# Patient Record
Sex: MALE | Race: BLACK OR AFRICAN AMERICAN | NOT HISPANIC OR LATINO | Employment: FULL TIME | URBAN - METROPOLITAN AREA
[De-identification: names, ages, dates, MRNs, and addresses within clinical notes are randomized per-mention and may not be internally consistent; named-entity substitution may affect disease eponyms.]

---

## 2018-05-01 ENCOUNTER — OFFICE VISIT (OUTPATIENT)
Dept: INTERNAL MEDICINE | Facility: CLINIC | Age: 43
End: 2018-05-01
Payer: COMMERCIAL

## 2018-05-01 VITALS
BODY MASS INDEX: 33.15 KG/M2 | WEIGHT: 211.19 LBS | SYSTOLIC BLOOD PRESSURE: 133 MMHG | HEIGHT: 67 IN | HEART RATE: 95 BPM | TEMPERATURE: 99 F | DIASTOLIC BLOOD PRESSURE: 75 MMHG

## 2018-05-01 DIAGNOSIS — B96.89 ACUTE BACTERIAL PHARYNGITIS: Primary | ICD-10-CM

## 2018-05-01 DIAGNOSIS — J02.8 ACUTE BACTERIAL PHARYNGITIS: Primary | ICD-10-CM

## 2018-05-01 PROCEDURE — 3008F BODY MASS INDEX DOCD: CPT | Mod: CPTII,S$GLB,, | Performed by: INTERNAL MEDICINE

## 2018-05-01 PROCEDURE — 99999 PR PBB SHADOW E&M-EST. PATIENT-LVL III: CPT | Mod: PBBFAC,,, | Performed by: INTERNAL MEDICINE

## 2018-05-01 PROCEDURE — 99213 OFFICE O/P EST LOW 20 MIN: CPT | Mod: S$GLB,,, | Performed by: INTERNAL MEDICINE

## 2018-05-01 PROCEDURE — 3075F SYST BP GE 130 - 139MM HG: CPT | Mod: CPTII,S$GLB,, | Performed by: INTERNAL MEDICINE

## 2018-05-01 PROCEDURE — 3078F DIAST BP <80 MM HG: CPT | Mod: CPTII,S$GLB,, | Performed by: INTERNAL MEDICINE

## 2018-05-01 RX ORDER — AMOXICILLIN 500 MG/1
500 TABLET, FILM COATED ORAL EVERY 12 HOURS
Qty: 20 TABLET | Refills: 0 | Status: SHIPPED | OUTPATIENT
Start: 2018-05-01 | End: 2018-05-11

## 2018-05-01 NOTE — PROGRESS NOTES
"Subjective:       Patient ID: Talon Leblanc is a 43 y.o. male.    Chief Complaint: Sore Throat  HPI     Mr. Leblanc is a 42 yo male who presents to clinic complaining of sore throat for 2 days. He also states he has been having increased saliva production and "spitting up because he cant clear his secretions due to pain. He denies any cough, runny nose, fevers, chills or myalgias.     Review of Systems   Constitutional: Negative for chills, diaphoresis, fatigue and fever.   HENT: Positive for sore throat and trouble swallowing. Negative for postnasal drip, rhinorrhea, sinus pain, sinus pressure and sneezing.    Eyes: Negative for photophobia and visual disturbance.   Respiratory: Negative for cough, shortness of breath and wheezing.    Cardiovascular: Negative for chest pain, palpitations and leg swelling.   Gastrointestinal: Negative for constipation, diarrhea, nausea and vomiting.   Endocrine: Negative for polydipsia and polyuria.   Genitourinary: Negative for dysuria, hematuria and urgency.   Musculoskeletal: Negative for arthralgias, myalgias and neck pain.   Allergic/Immunologic: Negative for immunocompromised state.   Neurological: Negative for dizziness, weakness and headaches.   Hematological: Negative for adenopathy.   Psychiatric/Behavioral: Negative for agitation and behavioral problems.       Objective:      Physical Exam   Constitutional: He is oriented to person, place, and time. He appears well-developed and well-nourished. No distress.   HENT:   Head: Normocephalic and atraumatic.   Mouth/Throat: Oropharyngeal exudate, posterior oropharyngeal edema and posterior oropharyngeal erythema present. No tonsillar abscesses.   Eyes: EOM are normal. Pupils are equal, round, and reactive to light.   Neck: Normal range of motion. No JVD present.   Cardiovascular: Normal rate and regular rhythm.    No murmur heard.  Pulmonary/Chest: Effort normal and breath sounds normal. No respiratory distress. "   Abdominal: Soft. He exhibits no distension. There is no guarding.   Musculoskeletal: Normal range of motion. He exhibits no edema.   Neurological: He is alert and oriented to person, place, and time.   Skin: Skin is warm and dry.   Psychiatric: He has a normal mood and affect. His behavior is normal.       Assessment:       1. Acute bacterial pharyngitis        Plan:       1. Most likely bacterial. Will prescribe Amoxacillin 500 mg BID for 10 days. Advised patient to go to ED if he feels he cant swallow or starts to drool.     Discussed with Dr. David MD PGY-3     I have discussed A/P with Mel and agree with plan of action.  Saul Mosley

## 2018-05-01 NOTE — LETTER
May 1, 2018      [facility] - Internal Medicine  [address]  [facility] [address]  Phone: [phone]  Fax: [phone]       Patient: Talon Leblanc   YOB: 1975  Date of Visit: 05/01/2018    To Whom It May Concern:    Talon Leblanc  was at [facility] on 05/01/2018. He may return to work/school on 5/3/18 with no restrictions. If you have any questions or concerns, or if I can be of further assistance, please do not hesitate to contact me.    Sincerely,      AIDEN CordovaBS

## 2018-07-20 ENCOUNTER — OFFICE VISIT (OUTPATIENT)
Dept: INTERNAL MEDICINE | Facility: CLINIC | Age: 43
End: 2018-07-20
Payer: COMMERCIAL

## 2018-07-20 VITALS
WEIGHT: 208.31 LBS | BODY MASS INDEX: 32.7 KG/M2 | HEIGHT: 67 IN | OXYGEN SATURATION: 98 % | DIASTOLIC BLOOD PRESSURE: 72 MMHG | HEART RATE: 83 BPM | SYSTOLIC BLOOD PRESSURE: 124 MMHG

## 2018-07-20 DIAGNOSIS — L08.9 PUSTULES DETERMINED BY EXAMINATION: ICD-10-CM

## 2018-07-20 DIAGNOSIS — L03.90 CELLULITIS, UNSPECIFIED CELLULITIS SITE: Primary | ICD-10-CM

## 2018-07-20 DIAGNOSIS — M62.830 BACK SPASM: ICD-10-CM

## 2018-07-20 PROCEDURE — 96372 THER/PROPH/DIAG INJ SC/IM: CPT | Mod: S$GLB,,, | Performed by: INTERNAL MEDICINE

## 2018-07-20 PROCEDURE — 99999 PR PBB SHADOW E&M-EST. PATIENT-LVL III: CPT | Mod: PBBFAC,,, | Performed by: INTERNAL MEDICINE

## 2018-07-20 PROCEDURE — 3074F SYST BP LT 130 MM HG: CPT | Mod: CPTII,S$GLB,, | Performed by: INTERNAL MEDICINE

## 2018-07-20 PROCEDURE — 3078F DIAST BP <80 MM HG: CPT | Mod: CPTII,S$GLB,, | Performed by: INTERNAL MEDICINE

## 2018-07-20 PROCEDURE — 3008F BODY MASS INDEX DOCD: CPT | Mod: CPTII,S$GLB,, | Performed by: INTERNAL MEDICINE

## 2018-07-20 PROCEDURE — 99214 OFFICE O/P EST MOD 30 MIN: CPT | Mod: 25,S$GLB,, | Performed by: INTERNAL MEDICINE

## 2018-07-20 RX ORDER — CEPHALEXIN 500 MG/1
500 CAPSULE ORAL 4 TIMES DAILY
Qty: 40 CAPSULE | Refills: 0 | Status: SHIPPED | OUTPATIENT
Start: 2018-07-20 | End: 2018-10-06

## 2018-07-20 RX ORDER — SULINDAC 150 MG/1
150 TABLET ORAL 2 TIMES DAILY
Qty: 14 TABLET | Refills: 0 | Status: SHIPPED | OUTPATIENT
Start: 2018-07-20 | End: 2018-10-06

## 2018-07-20 RX ORDER — MUPIROCIN 20 MG/G
OINTMENT TOPICAL
Qty: 30 G | Refills: 0 | Status: SHIPPED | OUTPATIENT
Start: 2018-07-20 | End: 2018-10-06

## 2018-07-20 RX ORDER — CEFTRIAXONE 500 MG/1
500 INJECTION, POWDER, FOR SOLUTION INTRAMUSCULAR; INTRAVENOUS
Status: COMPLETED | OUTPATIENT
Start: 2018-07-20 | End: 2018-07-20

## 2018-07-20 RX ADMIN — CEFTRIAXONE 500 MG: 500 INJECTION, POWDER, FOR SOLUTION INTRAMUSCULAR; INTRAVENOUS at 02:07

## 2018-07-20 NOTE — PATIENT INSTRUCTIONS
Discharge Instructions for Cellulitis  You have been diagnosed with cellulitis. This is an infection in the deepest layer of the skin. In some cases, the infection also affects the muscle. Cellulitis is caused by bacteria. The bacteria can enter the body through broken skin. This can happen with a cut, scratch, animal bite, or an insect bite that has been scratched. You may have been treated in the hospital with antibiotics and fluids. You will likely be given a prescription for antibiotics to take at home. This sheet will help you take care of yourself at home.  Home care  When you are home:  · Take the prescribed antibiotic medicine you are given as directed until it is gone. Take it even if you feel better. It treats the infection and stops it from returning. Not taking all the medicine can make future infections hard to treat.  · Keep the infected area clean.  · When possible, raise the infected area above the level of your heart. This helps keep swelling down.  · Talk with your healthcare provider if you are in pain. Ask what kind of over-the-counter medicine you can take for pain.  · Apply clean bandages as advised.  · Take your temperature once a day for a week.  · Wash your hands often to prevent spreading the infection.  In the future, wash your hands before and after you touch cuts, scratches, or bandages. This will help prevent infection.   When to call your healthcare provider  Call your healthcare provider immediately if you have any of the following:  · Difficulty or pain when moving the joints above or below the infected area  · Discharge or pus draining from the area  · Fever of 100.4°F (38°C) or higher, or as directed by your healthcare provider  · Pain that gets worse in or around the infected   · Redness that gets worse in or around the infected area, particularly if the area of redness expands to a wider area  · Shaking chills  · Swelling of the infected area  · Vomiting   Date Last Reviewed:  8/1/2016 © 2000-2017 [employer]. [address] [place] [address], [place] [address]. All rights reserved. This information is not intended as a substitute for professional medical care. Always follow your healthcare professional's instructions.        Cellulitis  Cellulitis is an infection of the deep layers of skin. A break in the skin, such as a cut or scratch, can let bacteria under the skin. If the bacteria get to deep layers of the skin, it can be serious. If not treated, cellulitis can get into the bloodstream and lymph nodes. The infection can then spread throughout the body. This causes serious illness.  Cellulitis causes the affected skin to become red, swollen, warm, and sore. The reddened areas have a visible border. An open sore may leak fluid (pus). You may have a fever, chills, and pain.  Cellulitis is treated with antibiotics taken for 7 to 10 days. An open sore may be cleaned and covered with cool wet gauze. Symptoms should get better 1 to 2 days after treatment is started. Make sure to take all the antibiotics for the full number of days until they are gone. Keep taking the medicine even if your symptoms go away.  Home care  Follow these tips:  · Limit the use of the part of your body with cellulitis.   · If the infection is on your leg, keep your leg raised while sitting. This will help to reduce swelling.  · Take all of the antibiotic medicine exactly as directed until it is gone. Do not miss any doses, especially during the first 7 days. Dont stop taking the medicine when your symptoms get better.  · Keep the affected area clean and dry.  · Wash your hands with soap and warm water before and after touching your skin. Anyone else who touches your skin should also wash his or her hands. Don't share towels.  Follow-up care  Follow up with your healthcare provider, or as advised. If your infection does not go away on the first antibiotic, your healthcare provider will prescribe a different  one.  When to seek medical advice  Call your healthcare provider right away if any of these occur:  · Red areas that spread  · Swelling or pain that gets worse  · Fluid leaking from the skin (pus)  · Fever higher of 100.4º F (38.0º C) or higher after 2 days on antibiotics  Date Last Reviewed: 9/1/2016  © 9874-9033 [employer]. [address] [place] [address], [place] [address]. All rights reserved. This information is not intended as a substitute for professional medical care. Always follow your healthcare professional's instructions.

## 2018-07-20 NOTE — PROGRESS NOTES
Subjective:       Patient ID: Talon Leblanc is a 43 y.o. male.    Chief Complaint: Skin Infection    [facility] appt    No PCP    Has an area on the left upper thigh that is uncomfortable and red.  Has had a boil on his arm in the past, about 3 years ago.  Not particularly prone to them.  Does not recall any trauma or injury.  No fever, chills or sweats.  No urinary symptoms or STD concerns.    No past history on file- no recent labs.       HPI  Review of Systems   Constitutional: Negative for chills, fatigue and fever.   HENT: Negative for congestion and postnasal drip.    Eyes: Negative.    Respiratory: Negative for cough, chest tightness and shortness of breath.    Cardiovascular: Negative.    Gastrointestinal: Negative.    Skin: Positive for rash.        Red and warm L thigh       Objective:      Physical Exam   Constitutional: He is oriented to person, place, and time. He appears well-developed and well-nourished.   HENT:   Head: Normocephalic and atraumatic.   Right Ear: External ear normal.   Left Ear: External ear normal.   Mouth/Throat: Oropharynx is clear and moist.   Neck: Normal range of motion. Neck supple. No thyromegaly present.   Cardiovascular: Normal rate and regular rhythm.    Pulmonary/Chest: No respiratory distress. He has no wheezes.   Abdominal: Soft. Bowel sounds are normal. He exhibits no distension. There is no tenderness.   Musculoskeletal: He exhibits no edema.   Neurological: He is alert and oriented to person, place, and time.   Skin: Skin is warm and dry. No rash noted. There is erythema.   Redness of the left upper thigh measuring about 5 or 6 cm.  There is a small area of firmness at the crease of the groin but there is no abscess.   Psychiatric: He has a normal mood and affect.       Assessment:       1. Cellulitis, unspecified cellulitis site    2. Pustules determined by examination    3.        Plan:         Cellulitis, unspecified cellulitis site  -     cefTRIAXone injection 500  mg; Inject 0.5 g (500 mg total) into the muscle one time.  -     mupirocin (BACTROBAN) 2 % ointment; Apply bid to bilateral nares x5d  Dispense: 30 g; Refill: 0      -     sulindac (CLINORIL) 150 MG tablet; Take 1 tablet (150 mg total) by mouth 2 (two) times daily.  Dispense: 14 tablet; Refill: 0    Other orders  -     cephALEXin (KEFLEX) 500 MG capsule; Take 1 capsule (500 mg total) by mouth 4 (four) times daily.  Dispense: 40 capsule; Refill: 0    Hygienic measures and natural history reviewed.  Cautions reviewed.  Alarm symptoms discussed.  Avoid manipulation.  Antibacterial soap.  Follow-up poor results.    Also encouraged him to return for EPP and labs to establish with a PCP.

## 2018-07-20 NOTE — PROGRESS NOTES
[facility] appt    No PCP    Has an area on the left upper thigh that is uncomfortable and red.  Has had a boil on his arm in the past, about 3 years ago.  Not particularly prone to them.  Does not recall any trauma or injury.  No fever, chills or sweats.  No urinary symptoms or STD concerns.

## 2018-07-24 ENCOUNTER — OFFICE VISIT (OUTPATIENT)
Dept: INTERNAL MEDICINE | Facility: CLINIC | Age: 43
End: 2018-07-24
Payer: COMMERCIAL

## 2018-07-24 VITALS
OXYGEN SATURATION: 98 % | WEIGHT: 211.63 LBS | HEART RATE: 78 BPM | SYSTOLIC BLOOD PRESSURE: 144 MMHG | TEMPERATURE: 98 F | BODY MASS INDEX: 33.15 KG/M2 | DIASTOLIC BLOOD PRESSURE: 82 MMHG

## 2018-07-24 DIAGNOSIS — L03.90 CELLULITIS, UNSPECIFIED CELLULITIS SITE: Primary | ICD-10-CM

## 2018-07-24 PROCEDURE — 3008F BODY MASS INDEX DOCD: CPT | Mod: CPTII,S$GLB,, | Performed by: NURSE PRACTITIONER

## 2018-07-24 PROCEDURE — 99212 OFFICE O/P EST SF 10 MIN: CPT | Mod: S$GLB,,, | Performed by: NURSE PRACTITIONER

## 2018-07-24 PROCEDURE — 3079F DIAST BP 80-89 MM HG: CPT | Mod: CPTII,S$GLB,, | Performed by: NURSE PRACTITIONER

## 2018-07-24 PROCEDURE — 3077F SYST BP >= 140 MM HG: CPT | Mod: CPTII,S$GLB,, | Performed by: NURSE PRACTITIONER

## 2018-07-24 PROCEDURE — 99999 PR PBB SHADOW E&M-EST. PATIENT-LVL III: CPT | Mod: PBBFAC,,, | Performed by: NURSE PRACTITIONER

## 2018-07-24 NOTE — PROGRESS NOTES
"Subjective:       Patient ID: Talon Leblanc is a 43 y.o. male.    Chief Complaint: Recurrent Skin Infections    HPI:  42 yo male that presents to clinic today for follow up of left groin cellulitis.    Was seen in  on 07/20/18 and was prescribed 10 day course of keflex and topical mupirocin ointment.    States that he feels that the rash is improving.  Reports that he does have "lump in his skin" on the left groin.  States that it is firm and not tender.  Reports no drainage.  Denies any fever, SOB, chest pain, n/v or dizziness.    States that he has been washing the area with warm soap and water but has intermittently been doing warm compresses to the area.    Review of Systems   Constitutional: Negative for activity change, appetite change, fatigue and fever.   Respiratory: Negative for apnea, cough, shortness of breath and wheezing.    Cardiovascular: Negative for chest pain, palpitations and leg swelling.   Musculoskeletal: Negative for arthralgias, back pain, myalgias, neck pain and neck stiffness.   Skin: Positive for color change and rash. Negative for wound.   Neurological: Negative for dizziness, light-headedness, numbness and headaches.   Psychiatric/Behavioral: Negative for behavioral problems.       Objective:      Physical Exam   Constitutional: He is oriented to person, place, and time. He appears well-developed and well-nourished. No distress.   Cardiovascular: Normal rate, regular rhythm, normal heart sounds and intact distal pulses.    Pulmonary/Chest: Effort normal and breath sounds normal. No respiratory distress. He has no wheezes. He has no rales.   Neurological: He is alert and oriented to person, place, and time. No cranial nerve deficit or sensory deficit.   Skin: Skin is warm, dry and intact. Rash noted.        There is a firm, non-tender, non-fluctuant mass measuring about 2cm noted to left inner thigh.  There is no drainage or pustule noted.  There is mild erythema noted to " surrounding area.       Assessment:       1. Cellulitis, unspecified cellulitis site        Plan:       1. Cellulitis, unspecified cellulitis site    -Blood pressure mildly elevated in clinic but the rest of the vitals are stable.  -Area looks to be improving per patient.  -I and D not warranted at this time.  -Continue to finish out keflex and continue topical mupirocin.  -Continue washing area daily with warm water and antibacterial soap.  -Encouraged to increase warm compresses to area 4-5 times a day to help mass come to a head.  -Encouraged to follow up if mass increases in size, becomes more painful or red and starts to drain.

## 2018-10-06 ENCOUNTER — HOSPITAL ENCOUNTER (EMERGENCY)
Facility: HOSPITAL | Age: 43
Discharge: HOME | End: 2018-10-06
Attending: EMERGENCY MEDICINE
Payer: COMMERCIAL

## 2018-10-06 VITALS
HEIGHT: 67 IN | BODY MASS INDEX: 31.39 KG/M2 | DIASTOLIC BLOOD PRESSURE: 76 MMHG | TEMPERATURE: 97 F | WEIGHT: 200 LBS | SYSTOLIC BLOOD PRESSURE: 148 MMHG | HEART RATE: 75 BPM | RESPIRATION RATE: 18 BRPM | OXYGEN SATURATION: 98 %

## 2018-10-06 DIAGNOSIS — T63.441A BEE STING, ACCIDENTAL OR UNINTENTIONAL, INITIAL ENCOUNTER: Primary | ICD-10-CM

## 2018-10-06 PROCEDURE — 99283 EMERGENCY DEPT VISIT LOW MDM: CPT

## 2018-10-06 PROCEDURE — 63600175 PHARM REV CODE 636 W HCPCS: Performed by: NURSE PRACTITIONER

## 2018-10-06 PROCEDURE — 25000003 PHARM REV CODE 250: Performed by: NURSE PRACTITIONER

## 2018-10-06 PROCEDURE — 99284 EMERGENCY DEPT VISIT MOD MDM: CPT | Mod: ,,, | Performed by: NURSE PRACTITIONER

## 2018-10-06 RX ORDER — PREDNISONE 20 MG/1
20 TABLET ORAL DAILY
Qty: 3 TABLET | Refills: 0 | Status: SHIPPED | OUTPATIENT
Start: 2018-10-06 | End: 2018-10-09

## 2018-10-06 RX ORDER — DIPHENHYDRAMINE HCL 50 MG
50 CAPSULE ORAL
Status: COMPLETED | OUTPATIENT
Start: 2018-10-06 | End: 2018-10-06

## 2018-10-06 RX ORDER — PREDNISONE 20 MG/1
40 TABLET ORAL
Status: COMPLETED | OUTPATIENT
Start: 2018-10-06 | End: 2018-10-06

## 2018-10-06 RX ADMIN — PREDNISONE 40 MG: 20 TABLET ORAL at 01:10

## 2018-10-06 RX ADMIN — DIPHENHYDRAMINE HYDROCHLORIDE 50 MG: 50 CAPSULE ORAL at 01:10

## 2018-10-06 NOTE — ED TRIAGE NOTES
Patient's name and date of birth checked and is correct.    LOC: The patient is awake, alert, and oriented to place, time, situation. Affect is appropriate.  Speech is appropriate and clear.      APPEARANCE: Patient resting comfortably, reporting palpation, light headedness,  in no acute distress.  Patient is clean and well groomed.     SKIN: The skin is warm and dry; color consistent with ethnicity.  Patient has normal skin turgor and moist mucus membranes.  Skin intact; no breakdown or bruising noted.      MUSCULOSKELETAL: Patient moving upper and lower extremities without difficulty.  Denies weakness.      RESPIRATORY: Airway is open and patent. Respirations spontaneous, even, easy, and non-labored.  Patient has a normal effort and rate.  No accessory muscle use noted. Denies cough.  BS clear.     CARDIAC:  No peripheral edema noted. No complaints of chest pain.       ABDOMEN: Soft and non tender to palpation.  No distention noted.      NEUROLOGIC: Eyes open spontaneously.  Behavior appropriate to situation.  Follows commands; facial expression symmetrical.  Purposeful motor response noted; normal sensation in all extremities.

## 2018-10-06 NOTE — ED PROVIDER NOTES
Encounter Date: 10/6/2018       History     Chief Complaint   Patient presents with    Insect Bite     Bee sting to his left cheek with redness and swelling noted.     43 y.o.male states he was stung by a bee on the right side of his face about 1 hours pta.  Denies allergy to bee stings.  States area is stinging; did not take any bendaryl or any meds pta.  Denies significant past medical history.  Denies difficulty breathing or swallowing.  Denies chest pain.            Review of patient's allergies indicates:  No Known Allergies  Past Medical History:   Diagnosis Date    Glaucoma     Prediabetes      History reviewed. No pertinent surgical history.  Family History   Problem Relation Age of Onset    No Known Problems Mother     No Known Problems Father      Social History     Tobacco Use    Smoking status: Never Smoker    Smokeless tobacco: Never Used   Substance Use Topics    Alcohol use: No    Drug use: No     Review of Systems   Constitutional: Negative for chills.   HENT: Positive for facial swelling. Negative for trouble swallowing.    Respiratory: Negative for shortness of breath.    Cardiovascular: Negative for chest pain and palpitations.   Musculoskeletal: Negative for arthralgias and myalgias.   Skin: Positive for wound. Negative for color change.   Neurological: Negative for weakness and numbness.       Physical Exam     Initial Vitals [10/06/18 1246]   BP Pulse Resp Temp SpO2   (!) 148/76 75 18 97.4 °F (36.3 °C) 98 %      MAP       --         Physical Exam    Vitals reviewed.  Constitutional: He appears well-developed and well-nourished.   HENT:   Head: Normocephalic.       Mouth/Throat: Uvula is midline, oropharynx is clear and moist and mucous membranes are normal.   Eyes: EOM are normal.   Neck: Normal range of motion. Neck supple.   Cardiovascular: Normal rate and regular rhythm.   Pulmonary/Chest: Breath sounds normal. No respiratory distress.   Musculoskeletal: Normal range of motion.    Neurological: He is alert and oriented to person, place, and time.   Skin: Skin is warm and dry. No rash noted.   Psychiatric: He has a normal mood and affect.         ED Course   Procedures  Labs Reviewed - No data to display       Imaging Results    None          Medical Decision Making:   Initial Assessment:   43 y.o.male states he was stung by a bee on the right side of his face about 1 hours pta.  Lt side of face with mild swelling; no erythema noted.  No overt allergic reaction noted.  Differential Diagnosis:   DDX includes but is not limited to insect bite, insect sting with local reaction, and allergic reaction.  ED Management:  Will give oral prednisone and benadryl.  Ice pack given to pt by nurse to apply to area.  Pt will be discharged home on prednisone x 3 days to start tomorrow and instructed to take OTC benadryl to help with swelling as directed on label.  Discharged to home in stable condition, return to ED warnings given, follow up and patient care instructions given.  I have discussed the treatment and management of this patient with my collaborating physician, and we agree on the plan of care.                      Clinical Impression:   The encounter diagnosis was Bee sting, accidental or unintentional, initial encounter.      Disposition:   Disposition: Discharged  Condition: Stable                        Mary Grace Corona NP  10/06/18 7717

## 2018-12-13 ENCOUNTER — OFFICE VISIT (OUTPATIENT)
Dept: INTERNAL MEDICINE | Facility: CLINIC | Age: 43
End: 2018-12-13
Payer: COMMERCIAL

## 2018-12-13 ENCOUNTER — LAB VISIT (OUTPATIENT)
Dept: LAB | Facility: HOSPITAL | Age: 43
End: 2018-12-13
Payer: COMMERCIAL

## 2018-12-13 VITALS
SYSTOLIC BLOOD PRESSURE: 136 MMHG | HEART RATE: 85 BPM | OXYGEN SATURATION: 97 % | BODY MASS INDEX: 32.87 KG/M2 | WEIGHT: 209.44 LBS | HEIGHT: 67 IN | DIASTOLIC BLOOD PRESSURE: 70 MMHG

## 2018-12-13 DIAGNOSIS — G56.03 CARPAL TUNNEL SYNDROME ON BOTH SIDES: ICD-10-CM

## 2018-12-13 DIAGNOSIS — R22.1 LOCALIZED SWELLING, MASS AND LUMP, NECK: Primary | ICD-10-CM

## 2018-12-13 DIAGNOSIS — R22.1 LOCALIZED SWELLING, MASS AND LUMP, NECK: ICD-10-CM

## 2018-12-13 LAB
BASOPHILS # BLD AUTO: 0.02 K/UL
BASOPHILS NFR BLD: 0.2 %
DIFFERENTIAL METHOD: ABNORMAL
EOSINOPHIL # BLD AUTO: 0.5 K/UL
EOSINOPHIL NFR BLD: 5.4 %
ERYTHROCYTE [DISTWIDTH] IN BLOOD BY AUTOMATED COUNT: 13.1 %
HCT VFR BLD AUTO: 45.1 %
HGB BLD-MCNC: 14.1 G/DL
LYMPHOCYTES # BLD AUTO: 2.9 K/UL
LYMPHOCYTES NFR BLD: 32.8 %
MCH RBC QN AUTO: 30.1 PG
MCHC RBC AUTO-ENTMCNC: 31.3 G/DL
MCV RBC AUTO: 96 FL
MONOCYTES # BLD AUTO: 0.7 K/UL
MONOCYTES NFR BLD: 8.1 %
NEUTROPHILS # BLD AUTO: 4.7 K/UL
NEUTROPHILS NFR BLD: 53.2 %
PLATELET # BLD AUTO: 244 K/UL
PMV BLD AUTO: 9.5 FL
RBC # BLD AUTO: 4.68 M/UL
TSH SERPL DL<=0.005 MIU/L-ACNC: 0.66 UIU/ML
WBC # BLD AUTO: 8.9 K/UL

## 2018-12-13 PROCEDURE — 3078F DIAST BP <80 MM HG: CPT | Mod: CPTII,S$GLB,, | Performed by: STUDENT IN AN ORGANIZED HEALTH CARE EDUCATION/TRAINING PROGRAM

## 2018-12-13 PROCEDURE — 3008F BODY MASS INDEX DOCD: CPT | Mod: CPTII,S$GLB,, | Performed by: STUDENT IN AN ORGANIZED HEALTH CARE EDUCATION/TRAINING PROGRAM

## 2018-12-13 PROCEDURE — 99999 PR PBB SHADOW E&M-EST. PATIENT-LVL V: CPT | Mod: PBBFAC,,, | Performed by: STUDENT IN AN ORGANIZED HEALTH CARE EDUCATION/TRAINING PROGRAM

## 2018-12-13 PROCEDURE — 99213 OFFICE O/P EST LOW 20 MIN: CPT | Mod: S$GLB,,, | Performed by: STUDENT IN AN ORGANIZED HEALTH CARE EDUCATION/TRAINING PROGRAM

## 2018-12-13 PROCEDURE — 84443 ASSAY THYROID STIM HORMONE: CPT

## 2018-12-13 PROCEDURE — 85025 COMPLETE CBC W/AUTO DIFF WBC: CPT

## 2018-12-13 PROCEDURE — 3075F SYST BP GE 130 - 139MM HG: CPT | Mod: CPTII,S$GLB,, | Performed by: STUDENT IN AN ORGANIZED HEALTH CARE EDUCATION/TRAINING PROGRAM

## 2018-12-13 PROCEDURE — 36415 COLL VENOUS BLD VENIPUNCTURE: CPT

## 2018-12-13 NOTE — PROGRESS NOTES
"Subjective:       Patient ID: Talon Leblanc is a 43 y.o. male.    Chief Complaint: Establish Care    HPI     43 y.o M presenting to clinic for an urgent care visit with a c/c of (1) enlarged swelling of right side of his neck & (2) numbness and tingling of bilateral hands.    (1) enlarged swelling of right side of his neck  Noticed swelling in neck 2 days.  No other lumps/bumps.  Not sexually active.  Denies contact with animals, IV drug use, or new medication usage.   Denies constitutional symptoms such as fever, night sweats, or weight loss  Denies travel or trauma.       (2) numbness and tingling of bilateral hands  Hx of carpal tunnel  Reports now he has weakness moving his right thumb.     Review of Systems   Constitutional: Negative for chills, diaphoresis, fever and unexpected weight change.   HENT: Negative for congestion, sneezing and sore throat.    Eyes: Negative for photophobia.   Respiratory: Negative for choking, chest tightness and shortness of breath.    Cardiovascular: Negative for chest pain, palpitations and leg swelling.   Gastrointestinal: Negative for abdominal distention and abdominal pain.   Endocrine: Negative for polydipsia and polyphagia.   Genitourinary: Negative for dysuria and hematuria.       Objective:       Vitals:    12/13/18 1502   BP: 136/70   Pulse: 85   SpO2: 97%   Weight: 95 kg (209 lb 7 oz)   Height: 5' 7" (1.702 m)       Physical Exam   Constitutional: He is oriented to person, place, and time. He appears well-developed and well-nourished.   HENT:   Head: Normocephalic and atraumatic.       Eyes: EOM are normal. Pupils are equal, round, and reactive to light.   Neck: Normal range of motion. Neck supple.   Cardiovascular: Normal rate, regular rhythm and normal heart sounds.   Pulmonary/Chest: Effort normal and breath sounds normal.   Abdominal: Soft. Bowel sounds are normal.   Musculoskeletal: Normal range of motion. He exhibits no edema.        Arms:  Neurological: " He is alert and oriented to person, place, and time.       Assessment:       1. Localized swelling, mass and lump, neck    2. Carpal tunnel syndrome on both sides        Plan:     Swelling of mass/neck   - possibly arising from the thyroid   - U/s neck   - TSH    Carpal Tunnel   - EMG   - referral to jake Huang MD     Internal Medicine PGY-2

## 2018-12-15 ENCOUNTER — HOSPITAL ENCOUNTER (OUTPATIENT)
Dept: RADIOLOGY | Facility: HOSPITAL | Age: 43
Discharge: HOME | End: 2018-12-15
Attending: STUDENT IN AN ORGANIZED HEALTH CARE EDUCATION/TRAINING PROGRAM
Payer: COMMERCIAL

## 2018-12-15 DIAGNOSIS — R22.1 LOCALIZED SWELLING, MASS AND LUMP, NECK: ICD-10-CM

## 2018-12-15 PROCEDURE — 76536 US EXAM OF HEAD AND NECK: CPT | Mod: 26,,, | Performed by: RADIOLOGY

## 2018-12-15 PROCEDURE — 76536 US EXAM OF HEAD AND NECK: CPT | Mod: TC

## 2018-12-18 NOTE — PROGRESS NOTES
Impressive thyroid exam.I have personally interviewed the patient, examined the patient and I agree with the resident's exam, assessment and plan.

## 2018-12-19 ENCOUNTER — TELEPHONE (OUTPATIENT)
Dept: INTERNAL MEDICINE | Facility: CLINIC | Age: 43
End: 2018-12-19

## 2018-12-19 NOTE — TELEPHONE ENCOUNTER
----- Message from Linda Huang MD sent at 12/19/2018 12:21 PM CST -----  Hello,    Could we call the patient and inform him that his Ultrasound results came back and there are multiple nodules. He needs a biopsy of his thyroid and to be evaluated by endocrinology. Orders have been placed. He will need these appointments scheduled.     Thank you.  Dr. Huang

## 2019-01-23 ENCOUNTER — TELEPHONE (OUTPATIENT)
Dept: INTERNAL MEDICINE | Facility: CLINIC | Age: 44
End: 2019-01-23

## 2019-01-23 NOTE — TELEPHONE ENCOUNTER
"Dear Dr. Rolle,  I staffed this patient for Dr. Huang in the internal medicine resident clinic.  The patient did not come to the Internal Medicine resident Clinic with any complaint regarding his thyroid, but had an obviously abnormal thyroid gland.    He agreed to have a thyroid ultrasound  He has a highly suspicious thyroid nodule   Seen on ultrasound 12-  "-Solid nodule in the inferior pole of the of the right lobe measuring 1.3 x 1.3 x 1.4 cm (solid, hyperechoic/isoechoic, taller than wider, ill-defined margins, and punctate echogenic foci; T5 highly suspicious, FNA recommended"  We do not know this man very well.  He seemed very pleasant but perhaps simple.  Dr. Huang will be seeing this man on February 4th to explain the importance of getting his thyroid problem evaluated,.  Could your staff please make an appointment for him to be seen after February 4th?  Sincerely, Dr. Abigail Chris          "

## 2019-01-23 NOTE — TELEPHONE ENCOUNTER
I had previously spoken to the patient on 12/19/18. And discussed the results and plan moving forward (Endocrinology and FNA of thyroid). However, the patient has not seen endocrinology. Dr. Chris spoke with me via email on 1/12/19. I attempted to call the patient. Today I was able to reach the mother (emergency contact) have left my cell number; plan is for patient to call me back.          Linda Huagn MD     Internal Medicine PGY-2

## 2019-01-24 ENCOUNTER — TELEPHONE (OUTPATIENT)
Dept: CRITICAL CARE MEDICINE | Facility: HOSPITAL | Age: 44
End: 2019-01-24

## 2019-01-24 NOTE — TELEPHONE ENCOUNTER
I was able to get in-contact with the patient. Explained the importance of FNA and follow-up appointments. Patient will see me (Dr. Huang) Feb 4/2019.         Linda Huang MD     Internal Medicine PGY-2

## 2019-01-29 ENCOUNTER — TELEPHONE (OUTPATIENT)
Dept: SURGERY | Facility: CLINIC | Age: 44
End: 2019-01-29

## 2019-01-29 DIAGNOSIS — E04.2 MULTIPLE THYROID NODULES: Primary | ICD-10-CM

## 2019-01-29 NOTE — TELEPHONE ENCOUNTER
Called pt to schedule him to see Dr. Rolle for a consult and biopsy in the [facility] clinic. He wasn't available. Spoke with mom. She will have him to call the office. He has an appt scheduled for Monday, February 25th @ 10A and 10:30 AM. Recent thyroid US report & TSH. Not on any blood thinners and NKA.

## 2019-01-30 DIAGNOSIS — M79.642 BILATERAL HAND PAIN: Primary | ICD-10-CM

## 2019-01-30 DIAGNOSIS — M79.641 BILATERAL HAND PAIN: Primary | ICD-10-CM

## 2019-01-31 ENCOUNTER — OFFICE VISIT (OUTPATIENT)
Dept: ORTHOPEDICS | Facility: CLINIC | Age: 44
End: 2019-01-31
Payer: COMMERCIAL

## 2019-01-31 ENCOUNTER — HOSPITAL ENCOUNTER (OUTPATIENT)
Dept: RADIOLOGY | Facility: HOSPITAL | Age: 44
Discharge: HOME | End: 2019-01-31
Attending: ORTHOPAEDIC SURGERY
Payer: COMMERCIAL

## 2019-01-31 VITALS — HEIGHT: 67 IN | BODY MASS INDEX: 32.87 KG/M2 | WEIGHT: 209.44 LBS

## 2019-01-31 DIAGNOSIS — M79.641 BILATERAL HAND PAIN: Primary | ICD-10-CM

## 2019-01-31 DIAGNOSIS — M79.642 BILATERAL HAND PAIN: Primary | ICD-10-CM

## 2019-01-31 DIAGNOSIS — M79.642 BILATERAL HAND PAIN: ICD-10-CM

## 2019-01-31 DIAGNOSIS — M79.641 BILATERAL HAND PAIN: ICD-10-CM

## 2019-01-31 PROCEDURE — 3008F BODY MASS INDEX DOCD: CPT | Mod: CPTII,S$GLB,, | Performed by: ORTHOPAEDIC SURGERY

## 2019-01-31 PROCEDURE — 3008F PR BODY MASS INDEX (BMI) DOCUMENTED: ICD-10-PCS | Mod: CPTII,S$GLB,, | Performed by: ORTHOPAEDIC SURGERY

## 2019-01-31 PROCEDURE — 73130 X-RAY EXAM OF HAND: CPT | Mod: 26,50,, | Performed by: RADIOLOGY

## 2019-01-31 PROCEDURE — 73130 XR HAND COMPLETE 3 VIEWS BILATERAL: ICD-10-PCS | Mod: 26,50,, | Performed by: RADIOLOGY

## 2019-01-31 PROCEDURE — 99999 PR PBB SHADOW E&M-EST. PATIENT-LVL II: CPT | Mod: PBBFAC,,, | Performed by: ORTHOPAEDIC SURGERY

## 2019-01-31 PROCEDURE — 99999 PR PBB SHADOW E&M-EST. PATIENT-LVL II: ICD-10-PCS | Mod: PBBFAC,,, | Performed by: ORTHOPAEDIC SURGERY

## 2019-01-31 PROCEDURE — 73130 X-RAY EXAM OF HAND: CPT | Mod: 50,TC

## 2019-01-31 PROCEDURE — 99203 OFFICE O/P NEW LOW 30 MIN: CPT | Mod: S$GLB,,, | Performed by: ORTHOPAEDIC SURGERY

## 2019-01-31 PROCEDURE — 99203 PR OFFICE/OUTPT VISIT, NEW, LEVL III, 30-44 MIN: ICD-10-PCS | Mod: S$GLB,,, | Performed by: ORTHOPAEDIC SURGERY

## 2019-01-31 NOTE — PROGRESS NOTES
"[facility]  History & Physical  Orthopedics    SUBJECTIVE:      Chief Complaint: bilateral CT    Referring Provider: Linda Huang MD       History of Present Illness:  Patient is a 43 y.o. right hand dominant male who presents today with complaints of bilateral hand pain from carpal tunnel for  The past years. It has been on and off but has been bad for the past year. He was seen at [facility] years ago and did have EMG which confirmed that he did have CT. He did wear splints and the pain went away but since recurred. Pain is often at night and wakes him from sleep.      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    Onset of symptoms/DOI was 8 years ago.    Symptoms are aggravated by activity and at night.    Symptoms are alleviated by activity.    Symptoms consist of pain.    The patient rates their pain as a 3/10.    Attempted treatment(s) and/or interventions include rest and immobilization.     The patient denies any fevers, chills, N/V, D/C and presents for evaluation.       Past Medical History:   Diagnosis Date    Glaucoma     Prediabetes      No past surgical history on file.  Review of patient's allergies indicates:  No Known Allergies  Social History     Social History Narrative    Not on file     Family History   Problem Relation Age of Onset    No Known Problems Mother     No Known Problems Father          Current Outpatient Medications:     latanoprost (XALATAN) 0.005 % ophthalmic solution, Inject 2 drops into the eye Daily., Disp: , Rfl:       Review of Systems:  Constitutional: no fever or chills  Eyes: no visual changes  ENT: no nasal congestion or sore throat  Respiratory: no cough or shortness of breath  Cardiovascular: no chest pain  Gastrointestinal: no nausea or vomiting, tolerating diet  Musculoskeletal: pain    OBJECTIVE:      Vital Signs (Most Recent):  Vitals:    01/31/19 1154   Weight: 95 kg (209 lb 7 oz)   Height: 5' 7" (1.702 m)     Body mass index is 32.8 " kg/m².      Physical Exam:  Constitutional: The patient appears well-developed and well-nourished. No distress.   Head: Normocephalic and atraumatic.   Nose: Nose normal.   Eyes: Conjunctivae and EOM are normal.   Neck: No tracheal deviation present.   Cardiovascular: Normal rate and intact distal pulses.    Pulmonary/Chest: Effort normal. No respiratory distress.   Abdominal: There is no guarding.   Neurological: The patient is alert.   Psychiatric: The patient has a normal mood and affect.     Bilateral Hand/Wrist Examination:    Observation/Inspection:  Swelling  none    Deformity  none  Discoloration  none     Scars   none    Atrophy  none  No visible atrophy bilaterally.    HAND/WRIST EXAMINATION:  Finkelstein's Test   Neg  Snuff box tenderness   Neg  Gonzalez's Test    Neg  Hook of Hamate Tenderness  Neg  CMC grind    Neg  Circumduction test   Neg    Patient is not able to oppose thumb to small finger with either hand.     Neurovascular Exam:  Digits WWP, brisk CR < 3s throughout  NVI motor/LTS to M/R/U nerves, radial pulse 2+  Tinel's Test - Carpal Tunnel  Neg  Tinel's Test - Cubital Tunnel Positive Bilaterally  Phalen's Test   Positive Bilaterally  Median Nerve Compression Test Neg    ROM hand/wrist/elbow full, painless      Diagnostic Results:     Xray  -Showing no bony abnormality      ASSESSMENT/PLAN:      43 y.o. yo male with bilateral CTS, severe with inability to oppose thumbs to small finger.    1) He is already scheduled for EMG  2) Will f/u after EMG to discuss surgery  3) CT splints fitted.         Charles Lay M.D.

## 2019-01-31 NOTE — LETTER
January 31, 2019      Linda Huang MD  [address]  [facility] [id number]           [facility] - Orthopedics  [address]  [facility] [address]  Phone: [phone]          Patient: Talon Leblanc   MR Number: [id number]   YOB: 1975   Date of Visit: 1/31/2019       Dear Dr. Linda Huang:    Thank you for referring Talon Leblanc to me for evaluation. Attached you will find relevant portions of my assessment and plan of care.    If you have questions, please do not hesitate to call me. I look forward to following Talon Leblanc along with you.    Sincerely,    Chalres Lay MD    Enclosure  CC:  No Recipients    If you would like to receive this communication electronically, please contact [email or web address] or [phone] to request more information on [employer] Link access.    For providers and/or their staff who would like to refer a patient to [facility], please contact us through our one-stop-shop provider referral line, [facility], at [phone].    If you feel you have received this communication in error or would no longer like to receive these types of communications, please e-mail [email or web address]

## 2019-02-04 ENCOUNTER — OFFICE VISIT (OUTPATIENT)
Dept: INTERNAL MEDICINE | Facility: CLINIC | Age: 44
End: 2019-02-04
Payer: COMMERCIAL

## 2019-02-04 VITALS
SYSTOLIC BLOOD PRESSURE: 128 MMHG | DIASTOLIC BLOOD PRESSURE: 80 MMHG | WEIGHT: 211.63 LBS | HEART RATE: 72 BPM | HEIGHT: 67 IN | OXYGEN SATURATION: 99 % | BODY MASS INDEX: 33.21 KG/M2

## 2019-02-04 DIAGNOSIS — G56.03 BILATERAL CARPAL TUNNEL SYNDROME: ICD-10-CM

## 2019-02-04 DIAGNOSIS — E04.2 MULTIPLE THYROID NODULES: Primary | ICD-10-CM

## 2019-02-04 PROCEDURE — 3079F PR MOST RECENT DIASTOLIC BLOOD PRESSURE 80-89 MM HG: ICD-10-PCS | Mod: CPTII,S$GLB,, | Performed by: STUDENT IN AN ORGANIZED HEALTH CARE EDUCATION/TRAINING PROGRAM

## 2019-02-04 PROCEDURE — 3008F BODY MASS INDEX DOCD: CPT | Mod: CPTII,S$GLB,, | Performed by: STUDENT IN AN ORGANIZED HEALTH CARE EDUCATION/TRAINING PROGRAM

## 2019-02-04 PROCEDURE — 3074F SYST BP LT 130 MM HG: CPT | Mod: CPTII,S$GLB,, | Performed by: STUDENT IN AN ORGANIZED HEALTH CARE EDUCATION/TRAINING PROGRAM

## 2019-02-04 PROCEDURE — 3079F DIAST BP 80-89 MM HG: CPT | Mod: CPTII,S$GLB,, | Performed by: STUDENT IN AN ORGANIZED HEALTH CARE EDUCATION/TRAINING PROGRAM

## 2019-02-04 PROCEDURE — 99213 PR OFFICE/OUTPT VISIT, EST, LEVL III, 20-29 MIN: ICD-10-PCS | Mod: S$GLB,,, | Performed by: STUDENT IN AN ORGANIZED HEALTH CARE EDUCATION/TRAINING PROGRAM

## 2019-02-04 PROCEDURE — 99999 PR PBB SHADOW E&M-EST. PATIENT-LVL III: ICD-10-PCS | Mod: PBBFAC,,, | Performed by: STUDENT IN AN ORGANIZED HEALTH CARE EDUCATION/TRAINING PROGRAM

## 2019-02-04 PROCEDURE — 3008F PR BODY MASS INDEX (BMI) DOCUMENTED: ICD-10-PCS | Mod: CPTII,S$GLB,, | Performed by: STUDENT IN AN ORGANIZED HEALTH CARE EDUCATION/TRAINING PROGRAM

## 2019-02-04 PROCEDURE — 3074F PR MOST RECENT SYSTOLIC BLOOD PRESSURE < 130 MM HG: ICD-10-PCS | Mod: CPTII,S$GLB,, | Performed by: STUDENT IN AN ORGANIZED HEALTH CARE EDUCATION/TRAINING PROGRAM

## 2019-02-04 PROCEDURE — 99999 PR PBB SHADOW E&M-EST. PATIENT-LVL III: CPT | Mod: PBBFAC,,, | Performed by: STUDENT IN AN ORGANIZED HEALTH CARE EDUCATION/TRAINING PROGRAM

## 2019-02-04 PROCEDURE — 99213 OFFICE O/P EST LOW 20 MIN: CPT | Mod: S$GLB,,, | Performed by: STUDENT IN AN ORGANIZED HEALTH CARE EDUCATION/TRAINING PROGRAM

## 2019-02-04 NOTE — PROGRESS NOTES
"Subjective:       Patient ID: Talon Leblanc is a 43 y.o. male.    Chief Complaint: Follow-up (patient had nodules on neck )    HPI   Mr. Leblanc is a 43 y.o patient who is presenting for follow-up regarding labs and imaging done in 2019. He initially presented to me in 2019 w/ a complaint of carpal tunnel and neck swelling. His PE exam was remarkable for a enlarged thyroid and US obtained- impressive for multiple nodules.   Otherwise ROS negative.     Today the results were discussed with patient and the plan. He will obtain a FNA and endocrinology appointments have been scheduled. Discussed with possibility of cancer but nothing is confirmed until biopsy. Patient understands and will not attend his appointment.    Reports having a cyst drained 10 years ago. No results available.   Denies radiation to the neck.  No family hx of cancer.    Review of Systems   Constitutional: Negative for chills, diaphoresis, fever and unexpected weight change.   HENT: Positive for trouble swallowing. Negative for congestion, sneezing and sore throat.    Eyes: Negative for photophobia.   Respiratory: Negative for choking, chest tightness and shortness of breath.    Cardiovascular: Negative for chest pain, palpitations and leg swelling.   Gastrointestinal: Negative for abdominal distention and abdominal pain.   Endocrine: Negative for polydipsia and polyphagia.   Genitourinary: Negative for dysuria and hematuria.   Neurological: Negative for light-headedness and numbness.       Objective:       Vitals:    02/04/19 1307   BP: 128/80   BP Location: Right arm   Patient Position: Sitting   BP Method: Large (Manual)   Pulse: 72   SpO2: 99%   Weight: 96 kg (211 lb 10.3 oz)   Height: 5' 7" (1.702 m)       Physical Exam   Constitutional: He is oriented to person, place, and time. He appears well-developed and well-nourished.   HENT:   Head: Normocephalic and atraumatic.       Eyes: EOM are normal. Pupils are equal, round, and " reactive to light.   Neck: Normal range of motion. Neck supple.   Cardiovascular: Normal rate, regular rhythm and normal heart sounds.   Pulmonary/Chest: Effort normal and breath sounds normal.   Abdominal: Soft. Bowel sounds are normal.   Musculoskeletal: Normal range of motion. He exhibits no edema.        Arms:  Neurological: He is alert and oriented to person, place, and time.         Assessment:       1. Multiple thyroid nodules    2. Bilateral carpal tunnel syndrome        Plan:       Multiple thyroid nodules  - appointment scheduled for FNA and endocrinology  - addressed the importance of biopsy  - patient extremely anxious regarding cancer; discussed that we need a biopsy and the plan moving forward will be based on the pathology results.          Linda Huang MD     Internal Medicine PGY-2

## 2019-02-25 ENCOUNTER — APPOINTMENT (OUTPATIENT)
Dept: RADIOLOGY | Facility: CLINIC | Age: 44
End: 2019-02-25
Attending: INTERNAL MEDICINE
Payer: COMMERCIAL

## 2019-02-25 ENCOUNTER — OFFICE VISIT (OUTPATIENT)
Dept: ENDOCRINOLOGY | Facility: CLINIC | Age: 44
End: 2019-02-25
Attending: INTERNAL MEDICINE
Payer: COMMERCIAL

## 2019-02-25 VITALS
HEIGHT: 67 IN | HEART RATE: 78 BPM | SYSTOLIC BLOOD PRESSURE: 134 MMHG | WEIGHT: 212.44 LBS | DIASTOLIC BLOOD PRESSURE: 80 MMHG | BODY MASS INDEX: 33.34 KG/M2

## 2019-02-25 DIAGNOSIS — E04.2 MULTINODULAR GOITER (NONTOXIC): Primary | ICD-10-CM

## 2019-02-25 DIAGNOSIS — E04.2 MULTIPLE THYROID NODULES: ICD-10-CM

## 2019-02-25 PROCEDURE — 88173 CYTOLOGY SPECIMEN-FNA NON-RADIOLOGY CLINICIAN PERFORMED W/O ON SITE: ICD-10-PCS | Mod: 26,,, | Performed by: PATHOLOGY

## 2019-02-25 PROCEDURE — 10008: ICD-10-PCS | Mod: S$GLB,,, | Performed by: INTERNAL MEDICINE

## 2019-02-25 PROCEDURE — 10007 FNA BX W/FLUOR GDN 1ST LES: CPT | Mod: S$GLB,,, | Performed by: INTERNAL MEDICINE

## 2019-02-25 PROCEDURE — 10008 FNA BX W/FLUOR GDN EA ADDL: CPT | Mod: S$GLB,,, | Performed by: INTERNAL MEDICINE

## 2019-02-25 PROCEDURE — 99244 OFF/OP CNSLTJ NEW/EST MOD 40: CPT | Mod: S$GLB,,, | Performed by: INTERNAL MEDICINE

## 2019-02-25 PROCEDURE — 99244 PR OFFICE CONSULTATION,LEVEL IV: ICD-10-PCS | Mod: S$GLB,,, | Performed by: INTERNAL MEDICINE

## 2019-02-25 PROCEDURE — 88173 CYTOPATH EVAL FNA REPORT: CPT | Performed by: PATHOLOGY

## 2019-02-25 PROCEDURE — 88173 CYTOPATH EVAL FNA REPORT: CPT | Mod: 26,,, | Performed by: PATHOLOGY

## 2019-02-25 PROCEDURE — 10007: ICD-10-PCS | Mod: S$GLB,,, | Performed by: INTERNAL MEDICINE

## 2019-02-25 NOTE — LETTER
February 25, 2019      Linda Huang MD  [address]  [facility] [id number]           [facility] [address]  [address], [address]  [facility] [address]  Phone: [phone]  Fax: [phone]          Patient: Talon Leblanc   MR Number: [id number]   YOB: 1975   Date of Visit: 2/25/2019       Dear Dr. Linda Huang:    Thank you for referring Talon Leblanc to me for evaluation. Attached you will find relevant portions of my assessment and plan of care.    If you have questions, please do not hesitate to call me. I look forward to following Talon Leblanc along with you.    Sincerely,    Fracisco Rolle MD    Enclosure  CC:  No Recipients    If you would like to receive this communication electronically, please contact [email or web address] or [phone] to request more information on [employer] Link access.    For providers and/or their staff who would like to refer a patient to [facility], please contact us through our one-stop-shop provider referral line, [facility], at [phone].    If you feel you have received this communication in error or would no longer like to receive these types of communications, please e-mail [email or web address]

## 2019-02-25 NOTE — PROGRESS NOTES
Subjective:      Patient ID: Talon Leblanc is a 43 y.o. male.    Chief Complaint:  Thyroid Nodule    Referral from Dr. Huang    History of Present Illness  Mr. Leblanc presents for evaluation and management of thyroid nodules    No pertinent past medical history.    First noted to have thyroid nodules about 10 years ago and reports having a cyst drained at that time.     Denies family history of thyroid cancer.  TSH WNL  No personal history of head or neck irradiation    Denies dysphagia, hoarseness or orthopnea.    Previous thyroid FNA results: cyst drainage over 10 years ago, he can't remember where it was done    Most recent thyroid USS dated 12/2018:  -Predominantly cystic nodule occupying most of the right lobe measuring 2.5 x 2.2 x 2.4 cm with a 0.9 mm focus of nodular mural thickening.    -Solid nodule in the inferior pole of the of the right lobe measuring 1.3 x 1.3 x 1.4 cm (solid, hyperechoic/isoechoic, taller than wider, ill-defined margins, and punctate echogenic foci; T5 highly suspicious, FNA recommend).    -Solid nodule occupying most of the mid and inferior pole of the left lobe measuring 3.8 x 2.9 x 2.9 cm (solid, hyperechoic/isoechoic, taller than wider, smooth margins and no echogenic foci; TR4 moderately suspicious, FNA is recommend).    -Solid nodule in the superior aspect of the left lobe measuring 1.1 x 1.2 x 8.2 cm.    -Solid nodule in the isthmus measuring 3.1 x 1.3 x 1.9 cm (solid, isoechoic, wider than taller, smooth margins, no echogenic foci; TR3 mildly suspicious, FNA is recommended).    Review of Systems   Constitutional: Negative for unexpected weight change.   Eyes: Negative for visual disturbance.   Respiratory: Negative for shortness of breath.    Cardiovascular: Negative for chest pain.   Gastrointestinal: Negative for abdominal pain.   Endocrine: Negative for cold intolerance.   Genitourinary: Negative for frequency.   Musculoskeletal: Negative for arthralgias.    Skin: Negative for rash.   Neurological: Negative for headaches.       Objective:   Physical Exam   Constitutional: He is oriented to person, place, and time. He appears well-developed and well-nourished.   HENT:   Head: Normocephalic and atraumatic.   Right Ear: External ear normal.   Left Ear: External ear normal.   Nose: Nose normal.   Neck: No tracheal deviation present.   Right, isthmus and left lobe thyroid nodules palpated   Cardiovascular: Normal rate, regular rhythm and normal heart sounds.   No edema   Pulmonary/Chest: Effort normal and breath sounds normal.   Abdominal: Soft. Bowel sounds are normal. There is no tenderness.   Musculoskeletal:   Normal gait, no cyanosis or clubbing   Neurological: He is alert and oriented to person, place, and time. He has normal reflexes.   Vibration sense intact   Skin: Skin is warm and dry. No rash noted.   No nodules, no ulcers   Psychiatric: He has a normal mood and affect. Judgment normal.   Vitals reviewed.      Lab Review:   Results for KEON ROSAS JR (MRN [id number]) as of 2/25/2019 10:13   Ref. Range 6/14/2013 07:40 12/13/2018 15:53   Sodium Latest Ref Range: 136 - 145 mmol/L 138    Potassium Latest Ref Range: 3.5 - 5.1 mmol/L 4.3    Chloride Latest Ref Range: 95 - 110 mmol/L 105    CO2 Latest Ref Range: 23 - 29 mmol/L 27    Anion Gap Latest Ref Range: 8 - 16 mmol/L 6.0 (L)    BUN, Bld Latest Ref Range: 6 - 20 mg/dl 15    Creatinine Latest Ref Range: 0.5 - 1.4 mg/dl 1.0    eGFR if non African American Latest Ref Range: >60 mL/min >60    eGFR if  Latest Ref Range: >60 mL/min >60    Glucose Latest Ref Range: 70 - 110 mg/dl 95    Calcium Latest Ref Range: 8.7 - 10.5 mg/dl 9.6    Alkaline Phosphatase Latest Ref Range: 55 - 135 U/L 66    Total Protein Latest Ref Range: 6.0 - 8.4 g/dL 7.1    Albumin Latest Ref Range: 3.5 - 5.2 g/dl 4.0    Total Bilirubin Latest Ref Range: 0.1 - 1.0 mg/dl 1.1 (H)    AST Latest Ref Range: 10 - 40 U/L 22    ALT  Latest Ref Range: 10 - 44 U/L 25    Triglycerides Latest Ref Range: 30 - 150 mg/dL 138    Cholesterol Latest Ref Range: 120 - 199 mg/dL 207 (H)    HDL Latest Ref Range: 40 - 75 mg/dL 37 (L)    HDL/Chol Ratio Latest Ref Range: 20 - 50 % 17.9 (L)    LDL Cholesterol Latest Ref Range: 63 - 159 mg/dL 142.0    Total Cholesterol/HDL Ratio Latest Ref Range: 2 - 5  5.6 (H)    Hemoglobin A1C External Latest Ref Range: 4.0 - 6.2 % 6.1    Estimated Avg Glucose Latest Ref Range: 68 - 131 mg/dL 128    TSH Latest Ref Range: 0.400 - 4.000 uIU/mL 0.949 0.659       Assessment:     1. Multinodular goiter (nontoxic)      Plan:     --Patient with multiple thyroid nodules  --Reports having a thyroid cyst drainage several years ago  --No compressive symtoms  --No risk factors for thyroid cancer  --TSH WNL  --Independently reviewed ultrasound images with the patient  --Has isthmus and left dominant nodule that meet criteria for FNA  --Reviewed FNA procedure and cytology outcomes including those that could necessitate repeat FNA   --He would like to proceed with FNA of isthmus and left lobe nodules  --Right lobe dominant nodule is mostly cystic so will not drain unless patient develops symptoms from this in the future    Copy to Dr. Reina Rolle M.D. Staff Endocrinology

## 2019-03-01 ENCOUNTER — TELEPHONE (OUTPATIENT)
Dept: ENDOCRINOLOGY | Facility: CLINIC | Age: 44
End: 2019-03-01

## 2019-03-01 DIAGNOSIS — E04.2 MULTINODULAR GOITER (NONTOXIC): Primary | ICD-10-CM

## 2019-03-01 NOTE — TELEPHONE ENCOUNTER
Please advise patient or his mother that I reviewed the results of his thyroid biopsy. Both nodules returned benign. I recommend that we repeat the ultrasound in one year and see him back at that time.

## 2019-03-04 NOTE — TELEPHONE ENCOUNTER
Spoke to patient's mother and let her know that Dr Rolle reviewed her son's results of his thyroid biopsy. Both nodules returned benign. Dr Rolle recommend that we repeat the ultrasound in one year and see him back at that time.

## 2019-03-11 ENCOUNTER — DOCUMENTATION ONLY (OUTPATIENT)
Dept: RADIOLOGY | Facility: CLINIC | Age: 44
End: 2019-03-11

## 2019-03-11 NOTE — PROGRESS NOTES
Pt had an ultrasound guided thyroid biopsy on 3/11/19. Images were available during the procedures.

## 2019-03-15 ENCOUNTER — PROCEDURE VISIT (OUTPATIENT)
Dept: NEUROLOGY | Facility: CLINIC | Age: 44
End: 2019-03-15
Payer: COMMERCIAL

## 2019-03-15 DIAGNOSIS — G56.03 CARPAL TUNNEL SYNDROME ON BOTH SIDES: ICD-10-CM

## 2019-03-15 PROCEDURE — 95907 NVR CNDJ TST 1-2 STUDIES: CPT | Mod: S$GLB,,, | Performed by: PSYCHIATRY & NEUROLOGY

## 2019-03-15 PROCEDURE — 95907 PR NERVE CONDUCTION STUDY; 1-2 STUDIES: ICD-10-PCS | Mod: S$GLB,,, | Performed by: PSYCHIATRY & NEUROLOGY

## 2019-03-29 ENCOUNTER — OFFICE VISIT (OUTPATIENT)
Dept: ORTHOPEDICS | Facility: CLINIC | Age: 44
End: 2019-03-29
Payer: COMMERCIAL

## 2019-03-29 VITALS
SYSTOLIC BLOOD PRESSURE: 117 MMHG | HEIGHT: 67 IN | DIASTOLIC BLOOD PRESSURE: 81 MMHG | BODY MASS INDEX: 33.32 KG/M2 | WEIGHT: 212.31 LBS | HEART RATE: 68 BPM

## 2019-03-29 DIAGNOSIS — G56.03 BILATERAL CARPAL TUNNEL SYNDROME: Primary | ICD-10-CM

## 2019-03-29 DIAGNOSIS — M79.641 BILATERAL HAND PAIN: ICD-10-CM

## 2019-03-29 DIAGNOSIS — M79.642 BILATERAL HAND PAIN: ICD-10-CM

## 2019-03-29 PROCEDURE — 99213 OFFICE O/P EST LOW 20 MIN: CPT | Mod: S$GLB,,, | Performed by: ORTHOPAEDIC SURGERY

## 2019-03-29 PROCEDURE — 3079F PR MOST RECENT DIASTOLIC BLOOD PRESSURE 80-89 MM HG: ICD-10-PCS | Mod: CPTII,S$GLB,, | Performed by: ORTHOPAEDIC SURGERY

## 2019-03-29 PROCEDURE — 3074F PR MOST RECENT SYSTOLIC BLOOD PRESSURE < 130 MM HG: ICD-10-PCS | Mod: CPTII,S$GLB,, | Performed by: ORTHOPAEDIC SURGERY

## 2019-03-29 PROCEDURE — 99999 PR PBB SHADOW E&M-EST. PATIENT-LVL III: CPT | Mod: PBBFAC,,, | Performed by: ORTHOPAEDIC SURGERY

## 2019-03-29 PROCEDURE — 99213 PR OFFICE/OUTPT VISIT, EST, LEVL III, 20-29 MIN: ICD-10-PCS | Mod: S$GLB,,, | Performed by: ORTHOPAEDIC SURGERY

## 2019-03-29 PROCEDURE — 99999 PR PBB SHADOW E&M-EST. PATIENT-LVL III: ICD-10-PCS | Mod: PBBFAC,,, | Performed by: ORTHOPAEDIC SURGERY

## 2019-03-29 PROCEDURE — 3008F PR BODY MASS INDEX (BMI) DOCUMENTED: ICD-10-PCS | Mod: CPTII,S$GLB,, | Performed by: ORTHOPAEDIC SURGERY

## 2019-03-29 PROCEDURE — 3008F BODY MASS INDEX DOCD: CPT | Mod: CPTII,S$GLB,, | Performed by: ORTHOPAEDIC SURGERY

## 2019-03-29 PROCEDURE — 3074F SYST BP LT 130 MM HG: CPT | Mod: CPTII,S$GLB,, | Performed by: ORTHOPAEDIC SURGERY

## 2019-03-29 PROCEDURE — 3079F DIAST BP 80-89 MM HG: CPT | Mod: CPTII,S$GLB,, | Performed by: ORTHOPAEDIC SURGERY

## 2019-03-29 NOTE — PROGRESS NOTES
[facility]  History & Physical  Orthopedics    SUBJECTIVE:      Chief Complaint: bilateral CT    Referring Provider: No ref. provider found       History of Present Illness:  Patient is a 43 y.o. right hand dominant male who presents today with complaints of bilateral hand pain from carpal tunnel for  The past years. It has been on and off but has been bad for the past year. He was seen at [facility] years ago and did have EMG which confirmed that he did have CT. He did wear splints and the pain went away but since recurred. Pain is often at night and wakes him from sleep.      Interval history 3/29/19: Pt returns today for reevaluation.  His hands are still numb and painful.  He attempted the EMG but did not tolerate it and did not complete it, and he returns for reevaluation.    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    Onset of symptoms/DOI was 8 years ago.    Symptoms are aggravated by activity and at night.    Symptoms are alleviated by activity.    Symptoms consist of pain.    The patient rates their pain as a 3/10.    Attempted treatment(s) and/or interventions include rest and immobilization.     The patient denies any fevers, chills, N/V, D/C and presents for evaluation.       Past Medical History:   Diagnosis Date    Glaucoma     Multiple thyroid nodules     Prediabetes      No past surgical history on file.  Review of patient's allergies indicates:  No Known Allergies  Social History     Social History Narrative    Not on file     Family History   Problem Relation Age of Onset    Thyroid nodules Mother     No Known Problems Father     Thyroid cancer Neg Hx          Current Outpatient Medications:     latanoprost (XALATAN) 0.005 % ophthalmic solution, Inject 2 drops into the eye Daily., Disp: , Rfl:       Review of Systems:  Constitutional: no fever or chills  Eyes: no visual changes  ENT: no nasal congestion or sore throat  Respiratory: no cough or shortness of breath  Cardiovascular: no  "chest pain  Gastrointestinal: no nausea or vomiting, tolerating diet  Musculoskeletal: pain    OBJECTIVE:      Vital Signs (Most Recent):  Vitals:    03/29/19 1100   BP: 117/81   BP Location: Right arm   Patient Position: Sitting   BP Method: Large (Automatic)   Pulse: 68   Weight: 96.3 kg (212 lb 4.9 oz)   Height: 5' 7" (1.702 m)     Body mass index is 33.25 kg/m².      Physical Exam:  Constitutional: The patient appears well-developed and well-nourished. No distress.   Head: Normocephalic and atraumatic.   Nose: Nose normal.   Eyes: Conjunctivae and EOM are normal.   Neck: No tracheal deviation present.   Cardiovascular: Normal rate and intact distal pulses.    Pulmonary/Chest: Effort normal. No respiratory distress.   Abdominal: There is no guarding.   Neurological: The patient is alert.   Psychiatric: The patient has a normal mood and affect.     Bilateral Hand/Wrist Examination:    Observation/Inspection:  Swelling  none    Deformity  none  Discoloration  none     Scars   none    Atrophy  none  No visible atrophy bilaterally.    HAND/WRIST EXAMINATION:  Finkelstein's Test   Neg  Snuff box tenderness   Neg  Gonzalez's Test    Neg  Hook of Hamate Tenderness  Neg  CMC grind    Neg  Circumduction test   Neg    Patient is not able to oppose thumb to small finger with either hand.     Neurovascular Exam:  Digits WWP, brisk CR < 3s throughout  NVI motor/LTS to M/R/U nerves, radial pulse 2+  Tinel's Test - Carpal Tunnel  Neg  Tinel's Test - Cubital Tunnel Positive Bilaterally  Phalen's Test   Positive Bilaterally  Median Nerve Compression Test Neg    ROM hand/wrist/elbow full, painless      Diagnostic Results:     Xray  -Showing no bony abnormality  EMG - pt terminated study      ASSESSMENT/PLAN:      43 y.o. yo male with bilateral CTS, severe with inability to oppose thumbs to small finger.    1) Pt amenable to redo EMG  2) Will f/u after EMG to discuss surgery  3) Continue night splints        Charles Lay, " M.D.

## 2019-10-12 ENCOUNTER — IMMUNIZATION (OUTPATIENT)
Dept: INTERNAL MEDICINE | Facility: CLINIC | Age: 44
End: 2019-10-12
Payer: COMMERCIAL

## 2019-10-12 PROCEDURE — 90471 FLU VACCINE (QUAD) GREATER THAN OR EQUAL TO 3YO PRESERVATIVE FREE IM: ICD-10-PCS | Mod: S$GLB,,, | Performed by: INTERNAL MEDICINE

## 2019-10-12 PROCEDURE — 90686 FLU VACCINE (QUAD) GREATER THAN OR EQUAL TO 3YO PRESERVATIVE FREE IM: ICD-10-PCS | Mod: S$GLB,,, | Performed by: INTERNAL MEDICINE

## 2019-10-12 PROCEDURE — 90686 IIV4 VACC NO PRSV 0.5 ML IM: CPT | Mod: S$GLB,,, | Performed by: INTERNAL MEDICINE

## 2019-10-12 PROCEDURE — 90471 IMMUNIZATION ADMIN: CPT | Mod: S$GLB,,, | Performed by: INTERNAL MEDICINE

## 2019-10-12 NOTE — PROGRESS NOTES
Administered flu vaccine to the right deltoid, tolerated well, no adverse reaction noted within wait period.

## 2019-12-06 ENCOUNTER — OFFICE VISIT (OUTPATIENT)
Dept: URGENT CARE | Facility: CLINIC | Age: 44
End: 2019-12-06
Payer: COMMERCIAL

## 2019-12-06 VITALS
WEIGHT: 212 LBS | HEART RATE: 77 BPM | BODY MASS INDEX: 33.27 KG/M2 | TEMPERATURE: 97 F | HEIGHT: 67 IN | SYSTOLIC BLOOD PRESSURE: 120 MMHG | DIASTOLIC BLOOD PRESSURE: 74 MMHG | OXYGEN SATURATION: 98 % | RESPIRATION RATE: 18 BRPM

## 2019-12-06 DIAGNOSIS — S90.31XA CONTUSION OF RIGHT FOOT, INITIAL ENCOUNTER: Primary | ICD-10-CM

## 2019-12-06 PROCEDURE — 99214 PR OFFICE/OUTPT VISIT, EST, LEVL IV, 30-39 MIN: ICD-10-PCS | Mod: S$GLB,,, | Performed by: FAMILY MEDICINE

## 2019-12-06 PROCEDURE — 73630 XR FOOT COMPLETE 3 VIEW RIGHT: ICD-10-PCS | Mod: RT,S$GLB,, | Performed by: RADIOLOGY

## 2019-12-06 PROCEDURE — 73630 X-RAY EXAM OF FOOT: CPT | Mod: RT,S$GLB,, | Performed by: RADIOLOGY

## 2019-12-06 PROCEDURE — 99214 OFFICE O/P EST MOD 30 MIN: CPT | Mod: S$GLB,,, | Performed by: FAMILY MEDICINE

## 2019-12-06 RX ORDER — IBUPROFEN 200 MG
200 TABLET ORAL EVERY 6 HOURS PRN
COMMUNITY
End: 2021-10-26

## 2019-12-06 RX ORDER — NAPROXEN 500 MG/1
500 TABLET ORAL 2 TIMES DAILY WITH MEALS
Qty: 14 TABLET | Refills: 0 | Status: SHIPPED | OUTPATIENT
Start: 2019-12-06 | End: 2019-12-13

## 2019-12-06 NOTE — PATIENT INSTRUCTIONS
PLEASE READ YOUR DISCHARGE INSTRUCTIONS ENTIRELY AS IT CONTAINS IMPORTANT INFORMATION.    Tylenol as needed  Naproxen twice daily with food    Rest, ice, compress, and elevate at home    Avoid prolonged use of the affected area until better.     Please return or see your primary care doctor if you develop new or worsening symptoms.     Please arrange follow up with your primary medical clinic as soon as possible. You must understand that you've received an Urgent Care treatment only and that you may be released before all of your medical problems are known or treated. You, the patient, will arrange for follow up as instructed. If your symptoms worsen or fail to improve you should go to the Emergency Room.    Bruises (Contusions)    A contusion is a bruise. A bruise happens when a blow to your body doesn't break the skin but does break blood vessels beneath the skin. Blood leaking from the broken vessels causes redness and swelling. As it heals, your bruise is likely to turn colors like purple, green, and yellow. This is normal. The bruise should fade in 2 or 3 weeks.  Factors that make you more likely to bruise  Almost everyone bruises now and then. Certain people do bruise more easily than others. You're more prone to bruising as you get older. That's because blood vessels become more fragile with age. You're also more likely to bruise if you have a clotting disorder such as hemophilia or take medications that reduce clotting, including aspirin, coumadin, newer agents.  When to go to the emergency room (ER)  Bruises almost always heal on their own without special treatment. But for some people, a bad bruise can be serious. Seek medical care if you:  · Have a clotting disorder such as hemophilia.  · Have cirrhosis or other serious liver disease.  · Take blood-thinning medications such as warfarin (Coumadin).  What to expect in the ER  A doctor will examine your bruise and ask about any health conditions you have. In  some cases, you may have a test to check how well your blood clots. Other treatment will depend on your needs.  Follow-up care  Sometimes a bruise gets worse instead of better. It may become larger and more swollen. This can occur when your body walls off a small pool of blood under the skin (hematoma). In very rare cases, your doctor may need to drain excess blood from the area.  Tip:  Apply an ice pack or bag of frozen peas to a bruise (keep a thin cloth between the cold source and your skin). This can help reduce redness and swelling.   Date Last Reviewed: 12/1/2016  © 5766-9943 [email or web address]. [address] [place] [address], [place] [address]. All rights reserved. This information is not intended as a substitute for professional medical care. Always follow your healthcare professional's instructions.
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
## 2020-02-28 ENCOUNTER — OFFICE VISIT (OUTPATIENT)
Dept: URGENT CARE | Facility: CLINIC | Age: 45
End: 2020-02-28
Payer: COMMERCIAL

## 2020-02-28 VITALS
SYSTOLIC BLOOD PRESSURE: 141 MMHG | BODY MASS INDEX: 33.27 KG/M2 | HEIGHT: 67 IN | DIASTOLIC BLOOD PRESSURE: 80 MMHG | RESPIRATION RATE: 16 BRPM | WEIGHT: 212 LBS | OXYGEN SATURATION: 97 % | TEMPERATURE: 98 F | HEART RATE: 94 BPM

## 2020-02-28 DIAGNOSIS — B96.89 ACUTE BACTERIAL SINUSITIS: Primary | ICD-10-CM

## 2020-02-28 DIAGNOSIS — J01.90 ACUTE BACTERIAL SINUSITIS: Primary | ICD-10-CM

## 2020-02-28 PROCEDURE — 99214 OFFICE O/P EST MOD 30 MIN: CPT | Mod: S$GLB,,, | Performed by: NURSE PRACTITIONER

## 2020-02-28 PROCEDURE — 99214 PR OFFICE/OUTPT VISIT, EST, LEVL IV, 30-39 MIN: ICD-10-PCS | Mod: S$GLB,,, | Performed by: NURSE PRACTITIONER

## 2020-02-28 RX ORDER — FLUTICASONE PROPIONATE 50 MCG
2 SPRAY, SUSPENSION (ML) NASAL DAILY
Qty: 16 ML | Refills: 0 | Status: SHIPPED | OUTPATIENT
Start: 2020-02-28 | End: 2021-10-26

## 2020-02-28 RX ORDER — AMOXICILLIN AND CLAVULANATE POTASSIUM 875; 125 MG/1; MG/1
1 TABLET, FILM COATED ORAL 2 TIMES DAILY
Qty: 20 TABLET | Refills: 0 | Status: SHIPPED | OUTPATIENT
Start: 2020-02-28 | End: 2020-03-09

## 2020-02-28 NOTE — LETTER
February 28, 2020      [facility] [address] [place]  [facility] [address]  Phone: [phone]  Fax: [phone]       Patient: Talon Leblanc   YOB: 1975  Date of Visit: 02/28/2020    To Whom It May Concern:    Talon Leblanc  was at [facility] on 02/28/2020. He may return to work/school on 03/02/2020 with no restrictions. If you have any questions or concerns, or if I can be of further assistance, please do not hesitate to contact me.    Sincerely,    Sunshine Saleh MA

## 2020-02-29 NOTE — PROGRESS NOTES
"Subjective:       Patient ID: Talon Leblanc is a 44 y.o. male.    Vitals:  height is 5' 7" (1.702 m) and weight is 96.2 kg (212 lb). His oral temperature is 97.7 °F (36.5 °C). His blood pressure is 141/80 (abnormal) and his pulse is 94. His respiration is 16 and oxygen saturation is 97%.     Chief Complaint: Sinus Problem    Patient reports sinus congestion off and on for over 1 month.  Returned earlier this week.  Denies fever, chills, or body aches.  Take Mohini [place] med with no relief.    Sinus Problem   This is a new problem. Episode onset: 1month off and on. The problem has been waxing and waning since onset. There has been no fever. His pain is at a severity of 6/10. Associated symptoms include congestion, ear pain (Left ear) and sinus pressure. Pertinent negatives include no chills, coughing, diaphoresis, headaches, hoarse voice, neck pain, shortness of breath, sneezing, sore throat or swollen glands. Treatments tried: mohini selzer. The treatment provided mild relief.       Constitution: Negative for chills, sweating, fatigue and fever.   HENT: Positive for ear pain (Left ear), congestion, sinus pain and sinus pressure. Negative for sore throat and voice change.    Neck: Negative for neck pain and painful lymph nodes.   Eyes: Negative for eye redness.   Respiratory: Negative for chest tightness, cough, sputum production, bloody sputum, COPD, shortness of breath, stridor, wheezing and asthma.    Gastrointestinal: Negative for nausea and vomiting.   Musculoskeletal: Negative for muscle ache.   Skin: Negative for rash.   Allergic/Immunologic: Negative for seasonal allergies, asthma and sneezing.   Neurological: Negative for headaches.   Hematologic/Lymphatic: Negative for swollen lymph nodes.       Objective:      Physical Exam   Constitutional: He is oriented to person, place, and time. He appears well-developed and well-nourished. He is cooperative.  Non-toxic appearance. He does not have a sickly " appearance. He does not appear ill. No distress.   HENT:   Head: Normocephalic and atraumatic.   Right Ear: Hearing, tympanic membrane, external ear and ear canal normal.   Left Ear: Hearing, external ear and ear canal normal. Tympanic membrane is erythematous. Tympanic membrane is not perforated, not retracted and not bulging. A middle ear effusion (serous fluid) is present.   Nose: Mucosal edema and purulent discharge present. No rhinorrhea or nasal deformity. No epistaxis. Right sinus exhibits maxillary sinus tenderness. Right sinus exhibits no frontal sinus tenderness. Left sinus exhibits maxillary sinus tenderness. Left sinus exhibits no frontal sinus tenderness.   Mouth/Throat: Uvula is midline, oropharynx is clear and moist and mucous membranes are normal. No trismus in the jaw. Normal dentition. No uvula swelling. No oropharyngeal exudate, posterior oropharyngeal edema or posterior oropharyngeal erythema.   Tenderness worse to left   Eyes: Conjunctivae and lids are normal. No scleral icterus.   Neck: Trachea normal, full passive range of motion without pain and phonation normal. Neck supple. No neck rigidity. No edema and no erythema present.   Cardiovascular: Normal rate, regular rhythm, normal heart sounds, intact distal pulses and normal pulses.   Pulmonary/Chest: Effort normal and breath sounds normal. No respiratory distress. He has no decreased breath sounds. He has no wheezes. He has no rhonchi.   Abdominal: Normal appearance.   Musculoskeletal: Normal range of motion. He exhibits no edema or deformity.   Neurological: He is alert and oriented to person, place, and time. He exhibits normal muscle tone. Coordination normal.   Skin: Skin is warm, dry, intact, not diaphoretic and not pale.   Psychiatric: He has a normal mood and affect. His speech is normal and behavior is normal. Judgment and thought content normal. Cognition and memory are normal.   Nursing note and vitals reviewed.        Assessment:        1. Acute bacterial sinusitis        Plan:         Acute bacterial sinusitis  -     fluticasone propionate (FLONASE) 50 mcg/actuation nasal spray; 2 sprays (100 mcg total) by Each Nostril route once daily.  Dispense: 16 mL; Refill: 0  -     amoxicillin-clavulanate 875-125mg (AUGMENTIN) 875-125 mg per tablet; Take 1 tablet by mouth 2 (two) times daily. for 10 days  Dispense: 20 tablet; Refill: 0      Patient Instructions   Please drink plenty of fluids.  Please get plenty of rest.  Please return here or go to the Emergency Department for any concerns or worsening of condition.  If you were prescribed antibiotics, please take them to completion.  If you do not have Hypertension or any history of palpitations, it is ok to take over the counter Sudafed or Mucinex D or Allegra-D or Claritin-D or Zyrtec-D.  If you do take one of the above, it is ok to combine that with plain over the counter Mucinex or Allegra or Claritin or Zyrtec.  If for example you are taking Zyrtec -D, you can combine that with Mucinex, but not Mucinex-D.  If you are taking Mucinex-D, you can combine that with plain Allegra or Claritin or Zyrtec.   If you do have Hypertension or palpitations, it is safe to take Coricidin HBP for relief of sinus symptoms.  We recommend you take over the counter Flonase (Fluticasone) or another nasally inhaled steroid unless you are already taking one.  Nasal irrigation with a saline spray or Netti Pot like device per their directions is also recommended.  If not allergic, please take over the counter Tylenol (Acetaminophen) and/or Motrin (Ibuprofen) as directed for control of pain and/or fever.  Please follow up with your primary care doctor or specialist as needed.    If you  smoke, please stop smoking.  Sinusitis (Antibiotic Treatment)    The sinuses are air-filled spaces within the bones of the face. They connect to the inside of the nose. Sinusitis is an inflammation of the tissue lining the sinus cavity.  Sinus inflammation can occur during a cold. It can also be due to allergies to pollens and other particles in the air. Sinusitis can cause symptoms of sinus congestion and fullness. A sinus infection causes fever, headache and facial pain. There is often green or yellow drainage from the nose or into the back of the throat (post-nasal drip). You have been given antibiotics to treat this condition.  Home care:  · Take the full course of antibiotics as instructed. Do not stop taking them, even if you feel better.  · Drink plenty of water, hot tea, and other liquids. This may help thin mucus. It also may promote sinus drainage.  · Heat may help soothe painful areas of the face. Use a towel soaked in hot water. Or,  the shower and direct the hot spray onto your face. Using a vaporizer along with a menthol rub at night may also help.   · An expectorant containing guaifenesin may help thin the mucus and promote drainage from the sinuses.  · Over-the-counter decongestants may be used unless a similar medicine was prescribed. Nasal sprays work the fastest. Use one that contains phenylephrine or oxymetazoline. First blow the nose gently. Then use the spray. Do not use these medicines more often than directed on the label or symptoms may get worse. You may also use tablets containing pseudoephedrine. Avoid products that combine ingredients, because side effects may be increased. Read labels. You can also ask the pharmacist for help. (NOTE: Persons with high blood pressure should not use decongestants. They can raise blood pressure.)  · Over-the-counter antihistamines may help if allergies contributed to your sinusitis.    · Do not use nasal rinses or irrigation during an acute sinus infection, unless told to by your health care provider. Rinsing may spread the infection to other sinuses.  · Use acetaminophen or ibuprofen to control pain, unless another pain medicine was prescribed. (If you have chronic liver or kidney  disease or ever had a stomach ulcer, talk with your doctor before using these medicines. Aspirin should never be used in anyone under 18 years of age who is ill with a fever. It may cause severe liver damage.)  · Don't smoke. This can worsen symptoms.  Follow-up care  Follow up with your healthcare provider or our staff if you are not improving within the next week.  When to seek medical advice  Call your healthcare provider if any of these occur:  · Facial pain or headache becoming more severe  · Stiff neck  · Unusual drowsiness or confusion  · Swelling of the forehead or eyelids  · Vision problems, including blurred or double vision  · Fever of 100.4ºF (38ºC) or higher, or as directed by your healthcare provider  · Seizure  · Breathing problems  · Symptoms not resolving within 10 days  Date Last Reviewed: 4/13/2015  © 5294-2815 [employer]. [address] [place] [address]. All rights reserved. This information is not intended as a substitute for professional medical care. Always follow your healthcare professional's instructions.

## 2020-02-29 NOTE — PATIENT INSTRUCTIONS
Please drink plenty of fluids.  Please get plenty of rest.  Please return here or go to the Emergency Department for any concerns or worsening of condition.  If you were prescribed antibiotics, please take them to completion.  If you do not have Hypertension or any history of palpitations, it is ok to take over the counter Sudafed or Mucinex D or Allegra-D or Claritin-D or Zyrtec-D.  If you do take one of the above, it is ok to combine that with plain over the counter Mucinex or Allegra or Claritin or Zyrtec.  If for example you are taking Zyrtec -D, you can combine that with Mucinex, but not Mucinex-D.  If you are taking Mucinex-D, you can combine that with plain Allegra or Claritin or Zyrtec.   If you do have Hypertension or palpitations, it is safe to take Coricidin HBP for relief of sinus symptoms.  We recommend you take over the counter Flonase (Fluticasone) or another nasally inhaled steroid unless you are already taking one.  Nasal irrigation with a saline spray or Netti Pot like device per their directions is also recommended.  If not allergic, please take over the counter Tylenol (Acetaminophen) and/or Motrin (Ibuprofen) as directed for control of pain and/or fever.  Please follow up with your primary care doctor or specialist as needed.    If you  smoke, please stop smoking.  Sinusitis (Antibiotic Treatment)    The sinuses are air-filled spaces within the bones of the face. They connect to the inside of the nose. Sinusitis is an inflammation of the tissue lining the sinus cavity. Sinus inflammation can occur during a cold. It can also be due to allergies to pollens and other particles in the air. Sinusitis can cause symptoms of sinus congestion and fullness. A sinus infection causes fever, headache and facial pain. There is often green or yellow drainage from the nose or into the back of the throat (post-nasal drip). You have been given antibiotics to treat this condition.  Home care:  · Take the full  course of antibiotics as instructed. Do not stop taking them, even if you feel better.  · Drink plenty of water, hot tea, and other liquids. This may help thin mucus. It also may promote sinus drainage.  · Heat may help soothe painful areas of the face. Use a towel soaked in hot water. Or,  the shower and direct the hot spray onto your face. Using a vaporizer along with a menthol rub at night may also help.   · An expectorant containing guaifenesin may help thin the mucus and promote drainage from the sinuses.  · Over-the-counter decongestants may be used unless a similar medicine was prescribed. Nasal sprays work the fastest. Use one that contains phenylephrine or oxymetazoline. First blow the nose gently. Then use the spray. Do not use these medicines more often than directed on the label or symptoms may get worse. You may also use tablets containing pseudoephedrine. Avoid products that combine ingredients, because side effects may be increased. Read labels. You can also ask the pharmacist for help. (NOTE: Persons with high blood pressure should not use decongestants. They can raise blood pressure.)  · Over-the-counter antihistamines may help if allergies contributed to your sinusitis.    · Do not use nasal rinses or irrigation during an acute sinus infection, unless told to by your health care provider. Rinsing may spread the infection to other sinuses.  · Use acetaminophen or ibuprofen to control pain, unless another pain medicine was prescribed. (If you have chronic liver or kidney disease or ever had a stomach ulcer, talk with your doctor before using these medicines. Aspirin should never be used in anyone under 18 years of age who is ill with a fever. It may cause severe liver damage.)  · Don't smoke. This can worsen symptoms.  Follow-up care  Follow up with your healthcare provider or our staff if you are not improving within the next week.  When to seek medical advice  Call your healthcare provider  if any of these occur:  · Facial pain or headache becoming more severe  · Stiff neck  · Unusual drowsiness or confusion  · Swelling of the forehead or eyelids  · Vision problems, including blurred or double vision  · Fever of 100.4ºF (38ºC) or higher, or as directed by your healthcare provider  · Seizure  · Breathing problems  · Symptoms not resolving within 10 days  Date Last Reviewed: 4/13/2015  © 4875-4505 [employer]. [address] [place] [address], [place] [id number]. All rights reserved. This information is not intended as a substitute for professional medical care. Always follow your healthcare professional's instructions.

## 2020-03-21 DIAGNOSIS — B96.89 ACUTE BACTERIAL SINUSITIS: ICD-10-CM

## 2020-03-21 DIAGNOSIS — J01.90 ACUTE BACTERIAL SINUSITIS: ICD-10-CM

## 2020-03-21 RX ORDER — FLUTICASONE PROPIONATE 50 MCG
2 SPRAY, SUSPENSION (ML) NASAL DAILY
Qty: 16 ML | Refills: 0 | OUTPATIENT
Start: 2020-03-21

## 2020-11-09 ENCOUNTER — OFFICE VISIT (OUTPATIENT)
Dept: URGENT CARE | Facility: CLINIC | Age: 45
End: 2020-11-09
Payer: COMMERCIAL

## 2020-11-09 VITALS
TEMPERATURE: 98 F | DIASTOLIC BLOOD PRESSURE: 88 MMHG | BODY MASS INDEX: 33.74 KG/M2 | HEART RATE: 94 BPM | RESPIRATION RATE: 18 BRPM | OXYGEN SATURATION: 98 % | WEIGHT: 215 LBS | HEIGHT: 67 IN | SYSTOLIC BLOOD PRESSURE: 145 MMHG

## 2020-11-09 DIAGNOSIS — J01.90 ACUTE NON-RECURRENT SINUSITIS, UNSPECIFIED LOCATION: ICD-10-CM

## 2020-11-09 DIAGNOSIS — R05.9 COUGH: Primary | ICD-10-CM

## 2020-11-09 LAB
CTP QC/QA: YES
SARS-COV-2 RDRP RESP QL NAA+PROBE: NEGATIVE

## 2020-11-09 PROCEDURE — 96372 THER/PROPH/DIAG INJ SC/IM: CPT | Mod: S$GLB,,, | Performed by: NURSE PRACTITIONER

## 2020-11-09 PROCEDURE — 99214 PR OFFICE/OUTPT VISIT, EST, LEVL IV, 30-39 MIN: ICD-10-PCS | Mod: 25,S$GLB,CS, | Performed by: NURSE PRACTITIONER

## 2020-11-09 PROCEDURE — U0002 COVID-19 LAB TEST NON-CDC: HCPCS | Mod: QW,S$GLB,, | Performed by: NURSE PRACTITIONER

## 2020-11-09 PROCEDURE — U0002: ICD-10-PCS | Mod: QW,S$GLB,, | Performed by: NURSE PRACTITIONER

## 2020-11-09 PROCEDURE — 99214 OFFICE O/P EST MOD 30 MIN: CPT | Mod: 25,S$GLB,CS, | Performed by: NURSE PRACTITIONER

## 2020-11-09 PROCEDURE — 96372 PR INJECTION,THERAP/PROPH/DIAG2ST, IM OR SUBCUT: ICD-10-PCS | Mod: S$GLB,,, | Performed by: NURSE PRACTITIONER

## 2020-11-09 RX ORDER — AMOXICILLIN AND CLAVULANATE POTASSIUM 875; 125 MG/1; MG/1
1 TABLET, FILM COATED ORAL 2 TIMES DAILY
Qty: 20 TABLET | Refills: 0 | Status: SHIPPED | OUTPATIENT
Start: 2020-11-09 | End: 2020-11-19

## 2020-11-09 RX ORDER — PROMETHAZINE HYDROCHLORIDE AND DEXTROMETHORPHAN HYDROBROMIDE 6.25; 15 MG/5ML; MG/5ML
5 SYRUP ORAL
Qty: 118 ML | Refills: 0 | Status: SHIPPED | OUTPATIENT
Start: 2020-11-09 | End: 2020-11-19

## 2020-11-09 RX ORDER — BENZONATATE 200 MG/1
200 CAPSULE ORAL 3 TIMES DAILY PRN
Qty: 20 CAPSULE | Refills: 0 | Status: SHIPPED | OUTPATIENT
Start: 2020-11-09 | End: 2020-11-19

## 2020-11-09 RX ORDER — BETAMETHASONE SODIUM PHOSPHATE AND BETAMETHASONE ACETATE 3; 3 MG/ML; MG/ML
9 INJECTION, SUSPENSION INTRA-ARTICULAR; INTRALESIONAL; INTRAMUSCULAR; SOFT TISSUE ONCE
Status: COMPLETED | OUTPATIENT
Start: 2020-11-09 | End: 2020-11-09

## 2020-11-09 RX ADMIN — BETAMETHASONE SODIUM PHOSPHATE AND BETAMETHASONE ACETATE 9 MG: 3; 3 INJECTION, SUSPENSION INTRA-ARTICULAR; INTRALESIONAL; INTRAMUSCULAR; SOFT TISSUE at 02:11

## 2020-11-09 NOTE — PROGRESS NOTES
"Subjective:       Patient ID: Talon Leblanc is a 45 y.o. male.    Vitals:  height is 5' 7" (1.702 m) and weight is 97.5 kg (215 lb). His temperature is 98.1 °F (36.7 °C). His blood pressure is 145/88 (abnormal) and his pulse is 94. His respiration is 18 and oxygen saturation is 98%.     Chief Complaint: Cough    Pt presents complaining of a mild dry cough, sinus congestion, and a scratchy throat since Saturday 11/07. Pt has taken OTC decongestant and warm drinks with mild relief.     Cough  This is a new problem. Episode onset: Saturday 11/07. The problem has been unchanged. The problem occurs constantly. The cough is non-productive. Associated symptoms include nasal congestion, postnasal drip and a sore throat. Pertinent negatives include no chest pain, chills, fever, headaches, myalgias, rash or shortness of breath. Nothing aggravates the symptoms. The treatment provided mild relief. There is no history of asthma, bronchitis, COPD or pneumonia.       Constitution: Negative for chills, fatigue and fever.   HENT: Positive for postnasal drip and sore throat. Negative for congestion.    Neck: Negative for painful lymph nodes.   Cardiovascular: Negative for chest pain and leg swelling.   Eyes: Negative for double vision and blurred vision.   Respiratory: Positive for cough. Negative for shortness of breath.    Gastrointestinal: Negative for nausea, vomiting and diarrhea.   Genitourinary: Negative for dysuria, frequency and urgency.   Musculoskeletal: Negative for joint pain, joint swelling, muscle cramps and muscle ache.   Skin: Negative for color change, pale and rash.   Allergic/Immunologic: Negative for seasonal allergies.   Neurological: Negative for dizziness, history of vertigo, light-headedness, passing out and headaches.   Hematologic/Lymphatic: Negative for swollen lymph nodes, easy bruising/bleeding and history of blood clots. Does not bruise/bleed easily.   Psychiatric/Behavioral: Negative for " nervous/anxious, sleep disturbance and depression. The patient is not nervous/anxious.        Objective:      Physical Exam   Constitutional: He is oriented to person, place, and time. He appears well-developed. He is cooperative.  Non-toxic appearance. He does not appear ill. No distress.   HENT:   Head: Normocephalic and atraumatic.   Ears:   Right Ear: Hearing, tympanic membrane, external ear and ear canal normal.   Left Ear: Hearing, tympanic membrane, external ear and ear canal normal.   Nose: Mucosal edema and rhinorrhea present. No nasal deformity. No epistaxis. Right sinus exhibits maxillary sinus tenderness. Right sinus exhibits no frontal sinus tenderness. Left sinus exhibits maxillary sinus tenderness. Left sinus exhibits no frontal sinus tenderness.   Mouth/Throat: Uvula is midline and mucous membranes are normal. No trismus in the jaw. Normal dentition. No uvula swelling. Posterior oropharyngeal edema and posterior oropharyngeal erythema present.   Eyes: Conjunctivae and lids are normal. Right eye exhibits no discharge. Left eye exhibits no discharge. No scleral icterus.   Neck: Trachea normal, normal range of motion, full passive range of motion without pain and phonation normal. Neck supple.   Cardiovascular: Normal rate, regular rhythm, normal heart sounds and normal pulses.   Pulmonary/Chest: Effort normal and breath sounds normal. No respiratory distress.   Abdominal: Soft. Normal appearance and bowel sounds are normal. He exhibits no distension, no pulsatile midline mass and no mass. There is no abdominal tenderness.   Musculoskeletal: Normal range of motion.         General: No deformity.   Neurological: He is alert and oriented to person, place, and time. He exhibits normal muscle tone. Coordination normal.   Skin: Skin is warm, dry, intact, not diaphoretic and not pale. Psychiatric: His speech is normal and behavior is normal. Judgment and thought content normal.   Nursing note and vitals  reviewed.        Assessment:       1. Cough    2. Acute non-recurrent sinusitis, unspecified location        Plan:         Cough  -     POCT COVID-19 Rapid Screening    Acute non-recurrent sinusitis, unspecified location    Other orders  -     betamethasone acetate-betamethasone sodium phosphate injection 9 mg  -     amoxicillin-clavulanate 875-125mg (AUGMENTIN) 875-125 mg per tablet; Take 1 tablet by mouth 2 (two) times daily. for 10 days  Dispense: 20 tablet; Refill: 0  -     promethazine-dextromethorphan (PROMETHAZINE-DM) 6.25-15 mg/5 mL Syrp; Take 5 mLs by mouth every 4 to 6 hours as needed.  Dispense: 118 mL; Refill: 0  -     benzonatate (TESSALON) 200 MG capsule; Take 1 capsule (200 mg total) by mouth 3 (three) times daily as needed for Cough.  Dispense: 20 capsule; Refill: 0

## 2020-11-09 NOTE — LETTER
November 9, 2020      [facility] [address] [place]  [facility] [address]  Phone: [phone]  Fax: [phone]       Patient: Talon Leblanc   YOB: 1975  Date of Visit: 11/09/2020    To Whom It May Concern:    Talon Leblanc  was at [facility] on 11/09/2020. He may return to work/school on 11/12/2020 with no restrictions. If you have any questions or concerns, or if I can be of further assistance, please do not hesitate to contact me.    Sincerely,    Buck Cooney, NP

## 2021-04-16 ENCOUNTER — PATIENT MESSAGE (OUTPATIENT)
Dept: RESEARCH | Facility: HOSPITAL | Age: 46
End: 2021-04-16

## 2021-05-04 ENCOUNTER — OFFICE VISIT (OUTPATIENT)
Dept: URGENT CARE | Facility: CLINIC | Age: 46
End: 2021-05-04
Payer: COMMERCIAL

## 2021-05-04 VITALS
SYSTOLIC BLOOD PRESSURE: 148 MMHG | BODY MASS INDEX: 32.96 KG/M2 | HEART RATE: 66 BPM | WEIGHT: 210 LBS | HEIGHT: 67 IN | TEMPERATURE: 99 F | OXYGEN SATURATION: 97 % | RESPIRATION RATE: 14 BRPM | DIASTOLIC BLOOD PRESSURE: 90 MMHG

## 2021-05-04 DIAGNOSIS — K13.70 ORAL LESION: Primary | ICD-10-CM

## 2021-05-04 PROCEDURE — 3008F BODY MASS INDEX DOCD: CPT | Mod: CPTII,S$GLB,, | Performed by: NURSE PRACTITIONER

## 2021-05-04 PROCEDURE — 99213 PR OFFICE/OUTPT VISIT, EST, LEVL III, 20-29 MIN: ICD-10-PCS | Mod: S$GLB,,, | Performed by: NURSE PRACTITIONER

## 2021-05-04 PROCEDURE — 3008F PR BODY MASS INDEX (BMI) DOCUMENTED: ICD-10-PCS | Mod: CPTII,S$GLB,, | Performed by: NURSE PRACTITIONER

## 2021-05-04 PROCEDURE — 99213 OFFICE O/P EST LOW 20 MIN: CPT | Mod: S$GLB,,, | Performed by: NURSE PRACTITIONER

## 2021-05-04 RX ORDER — TRIAMCINOLONE ACETONIDE 1 MG/G
PASTE DENTAL 2 TIMES DAILY
Qty: 5 G | Refills: 12 | Status: SHIPPED | OUTPATIENT
Start: 2021-05-04 | End: 2021-05-04

## 2021-05-04 RX ORDER — TRIAMCINOLONE ACETONIDE 1 MG/G
PASTE DENTAL 2 TIMES DAILY
Qty: 5 G | Refills: 0 | Status: SHIPPED | OUTPATIENT
Start: 2021-05-04 | End: 2021-10-26

## 2021-05-07 ENCOUNTER — TELEPHONE (OUTPATIENT)
Dept: OTOLARYNGOLOGY | Facility: CLINIC | Age: 46
End: 2021-05-07

## 2021-05-11 ENCOUNTER — OFFICE VISIT (OUTPATIENT)
Dept: OTOLARYNGOLOGY | Facility: CLINIC | Age: 46
End: 2021-05-11
Payer: COMMERCIAL

## 2021-05-11 VITALS
HEART RATE: 75 BPM | BODY MASS INDEX: 34.18 KG/M2 | DIASTOLIC BLOOD PRESSURE: 78 MMHG | WEIGHT: 218.25 LBS | SYSTOLIC BLOOD PRESSURE: 120 MMHG

## 2021-05-11 DIAGNOSIS — K14.8 TONGUE LESION: Primary | ICD-10-CM

## 2021-05-11 PROCEDURE — 99204 OFFICE O/P NEW MOD 45 MIN: CPT | Mod: 25,S$GLB,, | Performed by: OTOLARYNGOLOGY

## 2021-05-11 PROCEDURE — 88305 TISSUE EXAM BY PATHOLOGIST: CPT | Performed by: PATHOLOGY

## 2021-05-11 PROCEDURE — 99999 PR PBB SHADOW E&M-EST. PATIENT-LVL II: CPT | Mod: PBBFAC,,, | Performed by: OTOLARYNGOLOGY

## 2021-05-11 PROCEDURE — 88305 TISSUE EXAM BY PATHOLOGIST: ICD-10-PCS | Mod: 26,,, | Performed by: PATHOLOGY

## 2021-05-11 PROCEDURE — 41100 PR BIOPSY TONGUE,ANTER 2/3: ICD-10-PCS | Mod: S$GLB,,, | Performed by: OTOLARYNGOLOGY

## 2021-05-11 PROCEDURE — 3008F BODY MASS INDEX DOCD: CPT | Mod: CPTII,S$GLB,, | Performed by: OTOLARYNGOLOGY

## 2021-05-11 PROCEDURE — 88305 TISSUE EXAM BY PATHOLOGIST: CPT | Mod: 26,,, | Performed by: PATHOLOGY

## 2021-05-11 PROCEDURE — 3008F PR BODY MASS INDEX (BMI) DOCUMENTED: ICD-10-PCS | Mod: CPTII,S$GLB,, | Performed by: OTOLARYNGOLOGY

## 2021-05-11 PROCEDURE — 99999 PR PBB SHADOW E&M-EST. PATIENT-LVL II: ICD-10-PCS | Mod: PBBFAC,,, | Performed by: OTOLARYNGOLOGY

## 2021-05-11 PROCEDURE — 1126F PR PAIN SEVERITY QUANTIFIED, NO PAIN PRESENT: ICD-10-PCS | Mod: S$GLB,,, | Performed by: OTOLARYNGOLOGY

## 2021-05-11 PROCEDURE — 1126F AMNT PAIN NOTED NONE PRSNT: CPT | Mod: S$GLB,,, | Performed by: OTOLARYNGOLOGY

## 2021-05-11 PROCEDURE — 99204 PR OFFICE/OUTPT VISIT, NEW, LEVL IV, 45-59 MIN: ICD-10-PCS | Mod: 25,S$GLB,, | Performed by: OTOLARYNGOLOGY

## 2021-05-11 PROCEDURE — 41100 BIOPSY OF TONGUE: CPT | Mod: S$GLB,,, | Performed by: OTOLARYNGOLOGY

## 2021-05-18 LAB
FINAL PATHOLOGIC DIAGNOSIS: NORMAL
GROSS: NORMAL
Lab: NORMAL

## 2021-10-26 ENCOUNTER — OFFICE VISIT (OUTPATIENT)
Dept: INTERNAL MEDICINE | Facility: CLINIC | Age: 46
End: 2021-10-26
Payer: COMMERCIAL

## 2021-10-26 ENCOUNTER — HOSPITAL ENCOUNTER (OUTPATIENT)
Dept: RADIOLOGY | Facility: HOSPITAL | Age: 46
Discharge: HOME | End: 2021-10-26
Attending: INTERNAL MEDICINE
Payer: COMMERCIAL

## 2021-10-26 VITALS
HEIGHT: 66 IN | BODY MASS INDEX: 35.26 KG/M2 | OXYGEN SATURATION: 98 % | HEART RATE: 84 BPM | DIASTOLIC BLOOD PRESSURE: 96 MMHG | SYSTOLIC BLOOD PRESSURE: 140 MMHG | WEIGHT: 219.38 LBS

## 2021-10-26 DIAGNOSIS — R07.9 CHEST PAIN, UNSPECIFIED TYPE: ICD-10-CM

## 2021-10-26 DIAGNOSIS — E66.01 SEVERE OBESITY (BMI 35.0-35.9 WITH COMORBIDITY): ICD-10-CM

## 2021-10-26 DIAGNOSIS — I10 ESSENTIAL HYPERTENSION: ICD-10-CM

## 2021-10-26 DIAGNOSIS — R07.9 CHEST PAIN, UNSPECIFIED TYPE: Primary | ICD-10-CM

## 2021-10-26 DIAGNOSIS — L83 ACANTHOSIS NIGRICANS: ICD-10-CM

## 2021-10-26 DIAGNOSIS — E04.2 MULTINODULAR GOITER (NONTOXIC): ICD-10-CM

## 2021-10-26 DIAGNOSIS — G56.03 BILATERAL CARPAL TUNNEL SYNDROME: ICD-10-CM

## 2021-10-26 DIAGNOSIS — Z00.00 LABORATORY EXAM ORDERED AS PART OF ROUTINE GENERAL MEDICAL EXAMINATION: ICD-10-CM

## 2021-10-26 PROCEDURE — 3080F PR MOST RECENT DIASTOLIC BLOOD PRESSURE >= 90 MM HG: ICD-10-PCS | Mod: CPTII,S$GLB,, | Performed by: INTERNAL MEDICINE

## 2021-10-26 PROCEDURE — 3080F DIAST BP >= 90 MM HG: CPT | Mod: CPTII,S$GLB,, | Performed by: INTERNAL MEDICINE

## 2021-10-26 PROCEDURE — 1159F MED LIST DOCD IN RCRD: CPT | Mod: CPTII,S$GLB,, | Performed by: INTERNAL MEDICINE

## 2021-10-26 PROCEDURE — 93010 EKG 12-LEAD: ICD-10-PCS | Mod: S$GLB,,, | Performed by: INTERNAL MEDICINE

## 2021-10-26 PROCEDURE — 99214 OFFICE O/P EST MOD 30 MIN: CPT | Mod: S$GLB,,, | Performed by: INTERNAL MEDICINE

## 2021-10-26 PROCEDURE — 4010F PR ACE/ARB THEARPY RXD/TAKEN: ICD-10-PCS | Mod: CPTII,S$GLB,, | Performed by: INTERNAL MEDICINE

## 2021-10-26 PROCEDURE — 93005 ELECTROCARDIOGRAM TRACING: CPT | Mod: S$GLB,,, | Performed by: INTERNAL MEDICINE

## 2021-10-26 PROCEDURE — 99214 PR OFFICE/OUTPT VISIT, EST, LEVL IV, 30-39 MIN: ICD-10-PCS | Mod: S$GLB,,, | Performed by: INTERNAL MEDICINE

## 2021-10-26 PROCEDURE — 71046 XR CHEST PA AND LATERAL: ICD-10-PCS | Mod: 26,,, | Performed by: RADIOLOGY

## 2021-10-26 PROCEDURE — 93010 ELECTROCARDIOGRAM REPORT: CPT | Mod: S$GLB,,, | Performed by: INTERNAL MEDICINE

## 2021-10-26 PROCEDURE — 99999 PR PBB SHADOW E&M-EST. PATIENT-LVL III: ICD-10-PCS | Mod: PBBFAC,,, | Performed by: INTERNAL MEDICINE

## 2021-10-26 PROCEDURE — 71046 X-RAY EXAM CHEST 2 VIEWS: CPT | Mod: 26,,, | Performed by: RADIOLOGY

## 2021-10-26 PROCEDURE — 93005 EKG 12-LEAD: ICD-10-PCS | Mod: S$GLB,,, | Performed by: INTERNAL MEDICINE

## 2021-10-26 PROCEDURE — 3008F BODY MASS INDEX DOCD: CPT | Mod: CPTII,S$GLB,, | Performed by: INTERNAL MEDICINE

## 2021-10-26 PROCEDURE — 71046 X-RAY EXAM CHEST 2 VIEWS: CPT | Mod: TC

## 2021-10-26 PROCEDURE — 99999 PR PBB SHADOW E&M-EST. PATIENT-LVL III: CPT | Mod: PBBFAC,,, | Performed by: INTERNAL MEDICINE

## 2021-10-26 PROCEDURE — 3008F PR BODY MASS INDEX (BMI) DOCUMENTED: ICD-10-PCS | Mod: CPTII,S$GLB,, | Performed by: INTERNAL MEDICINE

## 2021-10-26 PROCEDURE — 1159F PR MEDICATION LIST DOCUMENTED IN MEDICAL RECORD: ICD-10-PCS | Mod: CPTII,S$GLB,, | Performed by: INTERNAL MEDICINE

## 2021-10-26 PROCEDURE — 3077F SYST BP >= 140 MM HG: CPT | Mod: CPTII,S$GLB,, | Performed by: INTERNAL MEDICINE

## 2021-10-26 PROCEDURE — 4010F ACE/ARB THERAPY RXD/TAKEN: CPT | Mod: CPTII,S$GLB,, | Performed by: INTERNAL MEDICINE

## 2021-10-26 PROCEDURE — 3077F PR MOST RECENT SYSTOLIC BLOOD PRESSURE >= 140 MM HG: ICD-10-PCS | Mod: CPTII,S$GLB,, | Performed by: INTERNAL MEDICINE

## 2021-10-26 RX ORDER — LOSARTAN POTASSIUM 25 MG/1
25 TABLET ORAL DAILY
Qty: 90 TABLET | Refills: 3 | Status: SHIPPED | OUTPATIENT
Start: 2021-10-26 | End: 2023-01-09

## 2021-11-03 ENCOUNTER — HOSPITAL ENCOUNTER (OUTPATIENT)
Dept: CARDIOLOGY | Facility: HOSPITAL | Age: 46
Discharge: HOME | End: 2021-11-03
Attending: INTERNAL MEDICINE
Payer: COMMERCIAL

## 2021-11-03 VITALS — WEIGHT: 220 LBS | HEIGHT: 67 IN | BODY MASS INDEX: 34.53 KG/M2

## 2021-11-03 DIAGNOSIS — R07.9 CHEST PAIN, UNSPECIFIED TYPE: ICD-10-CM

## 2021-11-03 LAB
ASCENDING AORTA: 2.74 CM
BSA FOR ECHO PROCEDURE: 2.17 M2
CV ECHO LV RWT: 0.44 CM
CV STRESS BASE HR: 70 BPM
DIASTOLIC BLOOD PRESSURE: 75 MMHG
DOP CALC LVOT AREA: 3 CM2
DOP CALC LVOT DIAMETER: 1.96 CM
DOP CALC LVOT PEAK VEL: 1.16 M/S
DOP CALC LVOT STROKE VOLUME: 71.56 CM3
DOP CALCLVOT PEAK VEL VTI: 23.73 CM
E WAVE DECELERATION TIME: 151.38 MSEC
E/A RATIO: 1.16
E/E' RATIO: 7.04 M/S
ECHO LV POSTERIOR WALL: 0.99 CM (ref 0.6–1.1)
EJECTION FRACTION: 65 %
FRACTIONAL SHORTENING: 43 % (ref 28–44)
INTERVENTRICULAR SEPTUM: 0.83 CM (ref 0.6–1.1)
IVRT: 71.36 MSEC
LA MAJOR: 4.59 CM
LA MINOR: 4.71 CM
LA WIDTH: 4.02 CM
LEFT ATRIUM SIZE: 3.96 CM
LEFT ATRIUM VOLUME INDEX MOD: 15.3 ML/M2
LEFT ATRIUM VOLUME INDEX: 29.8 ML/M2
LEFT ATRIUM VOLUME MOD: 32.36 CM3
LEFT ATRIUM VOLUME: 62.91 CM3
LEFT INTERNAL DIMENSION IN SYSTOLE: 2.56 CM (ref 2.1–4)
LEFT VENTRICLE DIASTOLIC VOLUME INDEX: 43.49 ML/M2
LEFT VENTRICLE DIASTOLIC VOLUME: 91.77 ML
LEFT VENTRICLE MASS INDEX: 64 G/M2
LEFT VENTRICLE SYSTOLIC VOLUME INDEX: 11.2 ML/M2
LEFT VENTRICLE SYSTOLIC VOLUME: 23.66 ML
LEFT VENTRICULAR INTERNAL DIMENSION IN DIASTOLE: 4.49 CM (ref 3.5–6)
LEFT VENTRICULAR MASS: 134.32 G
LV LATERAL E/E' RATIO: 6.23 M/S
LV SEPTAL E/E' RATIO: 8.1 M/S
MV A" WAVE DURATION": 14.84 MSEC
MV PEAK A VEL: 0.7 M/S
MV PEAK E VEL: 0.81 M/S
MV STENOSIS PRESSURE HALF TIME: 43.9 MS
MV VALVE AREA P 1/2 METHOD: 5.01 CM2
OHS CV CPX 1 MINUTE RECOVERY HEART RATE: 104 BPM
OHS CV CPX 85 PERCENT MAX PREDICTED HEART RATE MALE: 148
OHS CV CPX ESTIMATED METS: 11
OHS CV CPX MAX PREDICTED HEART RATE: 174
OHS CV CPX PATIENT IS FEMALE: 0
OHS CV CPX PATIENT IS MALE: 1
OHS CV CPX PEAK DIASTOLIC BLOOD PRESSURE: 90 MMHG
OHS CV CPX PEAK HEAR RATE: 157 BPM
OHS CV CPX PEAK RATE PRESSURE PRODUCT: NORMAL
OHS CV CPX PEAK SYSTOLIC BLOOD PRESSURE: 197 MMHG
OHS CV CPX PERCENT MAX PREDICTED HEART RATE ACHIEVED: 90
OHS CV CPX RATE PRESSURE PRODUCT PRESENTING: 9030
PISA TR MAX VEL: 2.15 M/S
PULM VEIN S/D RATIO: 1.5
PV PEAK D VEL: 0.42 M/S
PV PEAK S VEL: 0.63 M/S
RA MAJOR: 4.54 CM
RA PRESSURE: 3 MMHG
RA WIDTH: 2.86 CM
RIGHT VENTRICULAR END-DIASTOLIC DIMENSION: 2.66 CM
RV TISSUE DOPPLER FREE WALL SYSTOLIC VELOCITY 1 (APICAL 4 CHAMBER VIEW): 11.08 CM/S
SINUS: 2.59 CM
STJ: 2.58 CM
STRESS ECHO POST EXERCISE DUR MIN: 6 MINUTES
STRESS ECHO POST EXERCISE DUR SEC: 53 SECONDS
SYSTOLIC BLOOD PRESSURE: 129 MMHG
TDI LATERAL: 0.13 M/S
TDI SEPTAL: 0.1 M/S
TDI: 0.12 M/S
TR MAX PG: 18 MMHG
TRICUSPID ANNULAR PLANE SYSTOLIC EXCURSION: 1.92 CM
TV REST PULMONARY ARTERY PRESSURE: 21 MMHG

## 2021-11-03 PROCEDURE — 93351 STRESS TTE COMPLETE: CPT | Mod: 26,,, | Performed by: INTERNAL MEDICINE

## 2021-11-03 PROCEDURE — 93351 STRESS ECHO (CUPID ONLY): ICD-10-PCS | Mod: 26,,, | Performed by: INTERNAL MEDICINE

## 2021-11-03 PROCEDURE — 93351 STRESS TTE COMPLETE: CPT

## 2021-11-05 ENCOUNTER — TELEPHONE (OUTPATIENT)
Dept: INTERNAL MEDICINE | Facility: CLINIC | Age: 46
End: 2021-11-05
Payer: COMMERCIAL

## 2021-11-10 ENCOUNTER — CLINICAL SUPPORT (OUTPATIENT)
Dept: INTERNAL MEDICINE | Facility: CLINIC | Age: 46
End: 2021-11-10
Payer: COMMERCIAL

## 2021-11-10 DIAGNOSIS — E78.5 HYPERLIPIDEMIA, UNSPECIFIED HYPERLIPIDEMIA TYPE: Primary | ICD-10-CM

## 2021-11-10 RX ORDER — ROSUVASTATIN CALCIUM 10 MG/1
10 TABLET, COATED ORAL DAILY
Qty: 90 TABLET | Refills: 3 | Status: SHIPPED | OUTPATIENT
Start: 2021-11-10 | End: 2023-01-23

## 2022-01-26 ENCOUNTER — OFFICE VISIT (OUTPATIENT)
Dept: INTERNAL MEDICINE | Facility: CLINIC | Age: 47
End: 2022-01-26
Payer: COMMERCIAL

## 2022-01-26 VITALS
WEIGHT: 216.06 LBS | HEIGHT: 67 IN | SYSTOLIC BLOOD PRESSURE: 124 MMHG | BODY MASS INDEX: 33.91 KG/M2 | HEART RATE: 77 BPM | DIASTOLIC BLOOD PRESSURE: 70 MMHG | OXYGEN SATURATION: 98 %

## 2022-01-26 DIAGNOSIS — E78.5 HYPERLIPIDEMIA, UNSPECIFIED HYPERLIPIDEMIA TYPE: ICD-10-CM

## 2022-01-26 DIAGNOSIS — G56.03 BILATERAL CARPAL TUNNEL SYNDROME: ICD-10-CM

## 2022-01-26 DIAGNOSIS — Z76.89 ENCOUNTER TO ESTABLISH CARE: Primary | ICD-10-CM

## 2022-01-26 DIAGNOSIS — R73.03 PREDIABETES: ICD-10-CM

## 2022-01-26 DIAGNOSIS — Z12.11 COLON CANCER SCREENING: ICD-10-CM

## 2022-01-26 DIAGNOSIS — Z87.11 PERSONAL HISTORY OF GASTRIC ULCER: ICD-10-CM

## 2022-01-26 PROCEDURE — 99213 PR OFFICE/OUTPT VISIT, EST, LEVL III, 20-29 MIN: ICD-10-PCS | Mod: S$GLB,,, | Performed by: INTERNAL MEDICINE

## 2022-01-26 PROCEDURE — 4010F PR ACE/ARB THEARPY RXD/TAKEN: ICD-10-PCS | Mod: CPTII,S$GLB,, | Performed by: INTERNAL MEDICINE

## 2022-01-26 PROCEDURE — 1159F MED LIST DOCD IN RCRD: CPT | Mod: CPTII,S$GLB,, | Performed by: INTERNAL MEDICINE

## 2022-01-26 PROCEDURE — 99213 OFFICE O/P EST LOW 20 MIN: CPT | Mod: S$GLB,,, | Performed by: INTERNAL MEDICINE

## 2022-01-26 PROCEDURE — 3074F PR MOST RECENT SYSTOLIC BLOOD PRESSURE < 130 MM HG: ICD-10-PCS | Mod: CPTII,S$GLB,, | Performed by: INTERNAL MEDICINE

## 2022-01-26 PROCEDURE — 99999 PR PBB SHADOW E&M-EST. PATIENT-LVL III: CPT | Mod: PBBFAC,,, | Performed by: INTERNAL MEDICINE

## 2022-01-26 PROCEDURE — 3008F BODY MASS INDEX DOCD: CPT | Mod: CPTII,S$GLB,, | Performed by: INTERNAL MEDICINE

## 2022-01-26 PROCEDURE — 3078F PR MOST RECENT DIASTOLIC BLOOD PRESSURE < 80 MM HG: ICD-10-PCS | Mod: CPTII,S$GLB,, | Performed by: INTERNAL MEDICINE

## 2022-01-26 PROCEDURE — 3078F DIAST BP <80 MM HG: CPT | Mod: CPTII,S$GLB,, | Performed by: INTERNAL MEDICINE

## 2022-01-26 PROCEDURE — 3074F SYST BP LT 130 MM HG: CPT | Mod: CPTII,S$GLB,, | Performed by: INTERNAL MEDICINE

## 2022-01-26 PROCEDURE — 99999 PR PBB SHADOW E&M-EST. PATIENT-LVL III: ICD-10-PCS | Mod: PBBFAC,,, | Performed by: INTERNAL MEDICINE

## 2022-01-26 PROCEDURE — 4010F ACE/ARB THERAPY RXD/TAKEN: CPT | Mod: CPTII,S$GLB,, | Performed by: INTERNAL MEDICINE

## 2022-01-26 PROCEDURE — 1159F PR MEDICATION LIST DOCUMENTED IN MEDICAL RECORD: ICD-10-PCS | Mod: CPTII,S$GLB,, | Performed by: INTERNAL MEDICINE

## 2022-01-26 PROCEDURE — 3008F PR BODY MASS INDEX (BMI) DOCUMENTED: ICD-10-PCS | Mod: CPTII,S$GLB,, | Performed by: INTERNAL MEDICINE

## 2022-01-26 NOTE — PROGRESS NOTES
Subjective:       Patient ID: Talon Leblanc is a 46 y.o. male.    Chief Complaint: Follow-up      Talon Leblanc is a 46 y.o. year old male    HPI   46 y.o. with glaucoma, multiple thyroid nodules, prediabetes presents for establishment of care. At baseline health. Complaints of numbness b/l hands, which occurs worse at night, wakes him up at times. Has to shake it out.     Review of Systems   Constitutional: Negative for fatigue, fever and unexpected weight change.   HENT: Negative for congestion, hearing loss, sinus pressure, sore throat and trouble swallowing.    Eyes: Negative for redness, itching and visual disturbance.   Respiratory: Negative for cough, chest tightness, shortness of breath and wheezing.    Cardiovascular: Negative for chest pain, palpitations and leg swelling.   Gastrointestinal: Negative for abdominal distention, abdominal pain, blood in stool, constipation, diarrhea, nausea and vomiting.   Genitourinary: Negative for difficulty urinating, dysuria and hematuria.   Musculoskeletal: Negative for arthralgias, back pain, gait problem and myalgias.   Skin: Negative for rash.   Neurological: Positive for numbness. Negative for dizziness, syncope, weakness and headaches.        Parasthesias   Hematological: Does not bruise/bleed easily.   Psychiatric/Behavioral: Negative for sleep disturbance. The patient is not nervous/anxious.          Past Medical History:   Diagnosis Date    Glaucoma     Multiple thyroid nodules     Prediabetes         Prior to Admission medications    Medication Sig Start Date End Date Taking? Authorizing Provider   latanoprost 0.005 % ophthalmic solution Inject 2 drops into the eye Daily.   Yes Historical Provider   losartan (COZAAR) 25 MG tablet Take 1 tablet (25 mg total) by mouth once daily. 10/26/21 10/26/22 Yes Giancarlo Covarrubias MD   rosuvastatin (CRESTOR) 10 MG tablet Take 1 tablet (10 mg total) by mouth once daily. 11/10/21 11/10/22 Yes Giancarlo Covarrubias MD     "    Past medical history, surgical history, and family medical history reviewed and updated as appropriate.    Medications and allergies reviewed.     Objective:          Vitals:    01/26/22 1517   BP: 124/70   BP Location: Left arm   Patient Position: Sitting   BP Method: Large (Manual)   Pulse: 77   SpO2: 98%   Weight: 98 kg (216 lb 0.8 oz)   Height: 5' 7" (1.702 m)     Body mass index is 33.84 kg/m².  Physical Exam  Constitutional:       General: He is not in acute distress.     Appearance: He is well-developed.   HENT:      Head: Normocephalic and atraumatic.      Nose: Nose normal.   Eyes:      General: No scleral icterus.     Extraocular Movements: Extraocular movements intact.   Neck:      Thyroid: No thyromegaly.      Vascular: No JVD.      Trachea: No tracheal deviation.   Cardiovascular:      Rate and Rhythm: Normal rate and regular rhythm.      Heart sounds: Normal heart sounds. No murmur heard.    No friction rub. No gallop.   Pulmonary:      Effort: Pulmonary effort is normal. No respiratory distress.      Breath sounds: Normal breath sounds. No wheezing or rales.   Abdominal:      General: Bowel sounds are normal. There is no distension.      Palpations: Abdomen is soft. There is no mass.      Tenderness: There is no abdominal tenderness.   Musculoskeletal:         General: No tenderness. Normal range of motion.      Cervical back: Normal range of motion and neck supple.      Comments: (+) provocative testing CTS   Lymphadenopathy:      Cervical: No cervical adenopathy.   Skin:     General: Skin is warm and dry.      Findings: No rash.   Neurological:      Mental Status: He is alert and oriented to person, place, and time.      Cranial Nerves: No cranial nerve deficit.      Deep Tendon Reflexes: Reflexes normal.   Psychiatric:         Behavior: Behavior normal.         Lab Results   Component Value Date    WBC 6.54 11/03/2021    HGB 14.4 11/03/2021    HCT 44.6 11/03/2021     11/03/2021    CHOL " 258 (H) 11/03/2021    TRIG 173 (H) 11/03/2021    HDL 40 11/03/2021    ALT 44 11/03/2021    AST 32 11/03/2021     11/03/2021    K 4.6 11/03/2021     11/03/2021    CREATININE 0.9 11/03/2021    BUN 12 11/03/2021    CO2 28 11/03/2021    TSH 0.889 11/03/2021    HGBA1C 6.1 (H) 11/03/2021       Assessment:       1. Encounter to establish care    2. Bilateral carpal tunnel syndrome    3. Colon cancer screening    4. Personal history of gastric ulcer    5. Prediabetes    6. Hyperlipidemia, unspecified hyperlipidemia type          Plan:     Talon ROSALES Jr was seen today for follow-up.    Diagnoses and all orders for this visit:    Encounter to establish care  Comments:  benign exam, obtain routine labwork and age appropriate health screenings    Bilateral carpal tunnel syndrome  Comments:  severe symptoms, incomplete study. Has seen hand surgery. Hesistant about repeat EMG. Will book for future study, while patient makes decision.   Orders:  -     EMG W/ ULTRASOUND AND NERVE CONDUCTION TEST 2 Extremities; Future    Colon cancer screening  -     Case Request Endoscopy: COLONOSCOPY    Personal history of gastric ulcer  Comments:  diagnosed in college age 19; ([facility]), had EGD/c-scope before.    Prediabetes  Comments:  a1c 6.1; repeat in 6 months  Orders:  -     Hemoglobin A1C; Future    Hyperlipidemia, unspecified hyperlipidemia type  Comments:  started patient on crestor 10 mg daily after results of previous bloodwork. Recheck lipid panel in 6 months.  Orders:  -     Lipid Panel; Future        Health maintenance reviewed with patient.     Follow up in about 1 year (around 1/26/2023).    Giancarlo Covarrubias MD  Internal Medicine / Primary Care  [facility]  1/26/2022

## 2022-01-26 NOTE — PATIENT INSTRUCTIONS
Labwork in 6 months to check your prediabetes and cholesterol levels.  Schedule EMG two extremities to diagnosis severity of your carpal tunel.  Schedule follow up with hand surgery (Charles Lay MD)  Return to clinic in 1 year or sooner if needed

## 2022-04-01 ENCOUNTER — PROCEDURE VISIT (OUTPATIENT)
Dept: NEUROLOGY | Facility: CLINIC | Age: 47
End: 2022-04-01
Payer: COMMERCIAL

## 2022-04-01 DIAGNOSIS — G56.03 BILATERAL CARPAL TUNNEL SYNDROME: ICD-10-CM

## 2022-04-01 PROCEDURE — 95886 PR EMG COMPLETE, W/ NERVE CONDUCTION STUDIES, 5+ MUSCLES: ICD-10-PCS | Mod: S$GLB,,, | Performed by: PSYCHIATRY & NEUROLOGY

## 2022-04-01 PROCEDURE — 95913 NRV CNDJ TEST 13/> STUDIES: CPT | Mod: S$GLB,,, | Performed by: PSYCHIATRY & NEUROLOGY

## 2022-04-01 PROCEDURE — 95913 PR NERVE CONDUCTION STUDY; 13 OR MORE STUDIES: ICD-10-PCS | Mod: S$GLB,,, | Performed by: PSYCHIATRY & NEUROLOGY

## 2022-04-01 PROCEDURE — 95886 MUSC TEST DONE W/N TEST COMP: CPT | Mod: S$GLB,,, | Performed by: PSYCHIATRY & NEUROLOGY

## 2022-04-01 NOTE — PROCEDURES
Procedures       Please see NCS/EMG procedure report    Ivan Villalobos MD  [facility]

## 2022-04-21 ENCOUNTER — PATIENT MESSAGE (OUTPATIENT)
Dept: INTERNAL MEDICINE | Facility: CLINIC | Age: 47
End: 2022-04-21
Payer: COMMERCIAL

## 2022-04-21 DIAGNOSIS — G56.03 BILATERAL CARPAL TUNNEL SYNDROME: Primary | ICD-10-CM

## 2022-04-26 DIAGNOSIS — R52 PAIN AGGRAVATED BY EATING OR DRINKING: Primary | ICD-10-CM

## 2022-04-26 DIAGNOSIS — G56.03 BILATERAL CARPAL TUNNEL SYNDROME: ICD-10-CM

## 2022-04-27 ENCOUNTER — TELEPHONE (OUTPATIENT)
Dept: ORTHOPEDICS | Facility: CLINIC | Age: 47
End: 2022-04-27
Payer: COMMERCIAL

## 2022-04-29 ENCOUNTER — OFFICE VISIT (OUTPATIENT)
Dept: ORTHOPEDICS | Facility: CLINIC | Age: 47
End: 2022-04-29
Payer: COMMERCIAL

## 2022-04-29 ENCOUNTER — HOSPITAL ENCOUNTER (OUTPATIENT)
Dept: RADIOLOGY | Facility: OTHER | Age: 47
Discharge: HOME | End: 2022-04-29
Attending: PHYSICIAN ASSISTANT
Payer: COMMERCIAL

## 2022-04-29 VITALS — BODY MASS INDEX: 33.91 KG/M2 | HEIGHT: 67 IN | WEIGHT: 216.06 LBS

## 2022-04-29 DIAGNOSIS — G56.03 BILATERAL CARPAL TUNNEL SYNDROME: ICD-10-CM

## 2022-04-29 PROCEDURE — 99204 PR OFFICE/OUTPT VISIT, NEW, LEVL IV, 45-59 MIN: ICD-10-PCS | Mod: S$GLB,,, | Performed by: PHYSICIAN ASSISTANT

## 2022-04-29 PROCEDURE — 73130 X-RAY EXAM OF HAND: CPT | Mod: 26,,, | Performed by: RADIOLOGY

## 2022-04-29 PROCEDURE — 73130 XR HAND COMPLETE 3 VIEWS BILATERAL: ICD-10-PCS | Mod: 26,,, | Performed by: RADIOLOGY

## 2022-04-29 PROCEDURE — 1159F PR MEDICATION LIST DOCUMENTED IN MEDICAL RECORD: ICD-10-PCS | Mod: CPTII,S$GLB,, | Performed by: PHYSICIAN ASSISTANT

## 2022-04-29 PROCEDURE — 3008F BODY MASS INDEX DOCD: CPT | Mod: CPTII,S$GLB,, | Performed by: PHYSICIAN ASSISTANT

## 2022-04-29 PROCEDURE — 99999 PR PBB SHADOW E&M-EST. PATIENT-LVL III: CPT | Mod: PBBFAC,,, | Performed by: PHYSICIAN ASSISTANT

## 2022-04-29 PROCEDURE — 99204 OFFICE O/P NEW MOD 45 MIN: CPT | Mod: S$GLB,,, | Performed by: PHYSICIAN ASSISTANT

## 2022-04-29 PROCEDURE — 4010F PR ACE/ARB THEARPY RXD/TAKEN: ICD-10-PCS | Mod: CPTII,S$GLB,, | Performed by: PHYSICIAN ASSISTANT

## 2022-04-29 PROCEDURE — 1159F MED LIST DOCD IN RCRD: CPT | Mod: CPTII,S$GLB,, | Performed by: PHYSICIAN ASSISTANT

## 2022-04-29 PROCEDURE — 4010F ACE/ARB THERAPY RXD/TAKEN: CPT | Mod: CPTII,S$GLB,, | Performed by: PHYSICIAN ASSISTANT

## 2022-04-29 PROCEDURE — 73130 X-RAY EXAM OF HAND: CPT | Mod: TC,50,FY

## 2022-04-29 PROCEDURE — 99999 PR PBB SHADOW E&M-EST. PATIENT-LVL III: ICD-10-PCS | Mod: PBBFAC,,, | Performed by: PHYSICIAN ASSISTANT

## 2022-04-29 PROCEDURE — 3008F PR BODY MASS INDEX (BMI) DOCUMENTED: ICD-10-PCS | Mod: CPTII,S$GLB,, | Performed by: PHYSICIAN ASSISTANT

## 2022-04-29 NOTE — H&P (VIEW-ONLY)
"Subjective:      Patient ID: Talon Leblanc Jr. is a 47 y.o. male.    Chief Complaint: Pain and Numbness of the Left Hand and Pain and Numbness of the Right Hand      HPI  Talon Leblanc Jr. is a left hand dominant 47 y.o. male presenting today for evaluation of bilateral carpal tunnel. He reports numbness, tingling, pain, and weakness in the bilateral hands. Onset about 10 years ago. Symptoms are location in the median nerve distribution bilaterally. Numbness is constant. He has night time symptoms, symptoms awaken him. He reports weakness in the right thumb he notes difficulty with buttons. He has tried bracing with minimal relief. Denies hx of carpal tunnel surgery or injections. Recent EMG with severe bilateral carpal tunnel. 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      Review of patient's allergies indicates:  No Known Allergies      Current Outpatient Medications   Medication Sig Dispense Refill    latanoprost 0.005 % ophthalmic solution Inject 2 drops into the eye Daily.      losartan (COZAAR) 25 MG tablet Take 1 tablet (25 mg total) by mouth once daily. 90 tablet 3    rosuvastatin (CRESTOR) 10 MG tablet Take 1 tablet (10 mg total) by mouth once daily. 90 tablet 3     No current facility-administered medications for this visit.       Past Medical History:   Diagnosis Date    Glaucoma     Multiple thyroid nodules     Prediabetes        Past Surgical History:   Procedure Laterality Date    FRACTURE SURGERY      r femur fx w/ tatyana Right 2017       Review of Systems:  Constitutional: Negative for chills and fever.   Respiratory: Negative for cough and shortness of breath.    Gastrointestinal: Negative for nausea and vomiting.   Skin: Negative for rash.   Neurological: Negative for dizziness and headaches.   Psychiatric/Behavioral: Negative for depression.   MSK as in HPI       OBJECTIVE:     PHYSICAL EXAM:  Ht 5' 7" (1.702 m)   Wt 98 kg (216 lb 0.8 oz)   BMI 33.84 " kg/m²     GEN:  NAD, well-developed, well-groomed.  NEURO: Awake, alert, and oriented. Normal attention and concentration.    PSYCH: Normal mood and affect. Behavior is normal.  HEENT: No cervical lymphadenopathy noted.  CARDIOVASCULAR: Radial pulses 2+ bilaterally. No LE edema noted.  PULMONARY: Breath sounds normal. No respiratory distress.  SKIN: Intact, no rashes.      MSK:   BUE:  Good active ROM of the wrist and fingers. Positive tinels bilateral carpal tunnel. Positive durkans bilaterally. Negative tinels bilateral elbow. Thenar atrophy present bilaterally, R>L. He has some weakness with thumb opposition of the right side 5/5 thumb flexion/ extension. No significant right thumb CMC, no A1 pulley ttp. AIN/PIN/Radial/Median/Ulnar Nerves assessed in isolation without deficit. Radial & Ulnar arteries palpated 2+. Capillary Refill <3s.    RADIOGRAPHS:  Xray bl hands 4/29/22   Impression:   1. Mild stable degenerative changes of the left carpal bones.  Otherwise normal evaluation of the hands.     Comments: I have personally reviewed the imaging and I agree with the above radiologist's report.    EMG 4/1/22   Impression   This is an abnormal study. There is electrophysiologic evidence of:   1. Severe bilateral carpal tunnel syndrome (median neuropathy at the wrist) comparatively much worse on the right, where there is secondary acute or ongoing denervation and chronic denervation in the APB muscle.         ASSESSMENT/PLAN:       ICD-10-CM ICD-9-CM   1. Bilateral carpal tunnel syndrome  G56.03 354.0     Plan:   EMG reviewed with pt discussed severe carpal tunnel. Treatment options discussed. Pt wishes to proceed with a right carpal tunnel release. Consents reviewed and signed in clinic. Discussed weakness and numbness may not improve due to significant nerve change pt understand. RTC PO         The patient indicates understanding of these issues and agrees to the plan.    Angie Feng PA-C  Hand Clinic   [facility]  [place], [place]

## 2022-04-29 NOTE — PROGRESS NOTES
"Subjective:      Patient ID: Talon Leblanc Jr. is a 47 y.o. male.    Chief Complaint: Pain and Numbness of the Left Hand and Pain and Numbness of the Right Hand      HPI  Talon Leblanc Jr. is a left hand dominant 47 y.o. male presenting today for evaluation of bilateral carpal tunnel. He reports numbness, tingling, pain, and weakness in the bilateral hands. Onset about 10 years ago. Symptoms are location in the median nerve distribution bilaterally. Numbness is constant. He has night time symptoms, symptoms awaken him. He reports weakness in the right thumb he notes difficulty with buttons. He has tried bracing with minimal relief. Denies hx of carpal tunnel surgery or injections. Recent EMG with severe bilateral carpal tunnel. 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      Review of patient's allergies indicates:  No Known Allergies      Current Outpatient Medications   Medication Sig Dispense Refill    latanoprost 0.005 % ophthalmic solution Inject 2 drops into the eye Daily.      losartan (COZAAR) 25 MG tablet Take 1 tablet (25 mg total) by mouth once daily. 90 tablet 3    rosuvastatin (CRESTOR) 10 MG tablet Take 1 tablet (10 mg total) by mouth once daily. 90 tablet 3     No current facility-administered medications for this visit.       Past Medical History:   Diagnosis Date    Glaucoma     Multiple thyroid nodules     Prediabetes        Past Surgical History:   Procedure Laterality Date    FRACTURE SURGERY      r femur fx w/ tatyana Right 2017       Review of Systems:  Constitutional: Negative for chills and fever.   Respiratory: Negative for cough and shortness of breath.    Gastrointestinal: Negative for nausea and vomiting.   Skin: Negative for rash.   Neurological: Negative for dizziness and headaches.   Psychiatric/Behavioral: Negative for depression.   MSK as in HPI       OBJECTIVE:     PHYSICAL EXAM:  Ht 5' 7" (1.702 m)   Wt 98 kg (216 lb 0.8 oz)   BMI 33.84 " kg/m²     GEN:  NAD, well-developed, well-groomed.  NEURO: Awake, alert, and oriented. Normal attention and concentration.    PSYCH: Normal mood and affect. Behavior is normal.  HEENT: No cervical lymphadenopathy noted.  CARDIOVASCULAR: Radial pulses 2+ bilaterally. No LE edema noted.  PULMONARY: Breath sounds normal. No respiratory distress.  SKIN: Intact, no rashes.      MSK:   BUE:  Good active ROM of the wrist and fingers. Positive tinels bilateral carpal tunnel. Positive durkans bilaterally. Negative tinels bilateral elbow. Thenar atrophy present bilaterally, R>L. He has some weakness with thumb opposition of the right side 5/5 thumb flexion/ extension. No significant right thumb CMC, no A1 pulley ttp. AIN/PIN/Radial/Median/Ulnar Nerves assessed in isolation without deficit. Radial & Ulnar arteries palpated 2+. Capillary Refill <3s.    RADIOGRAPHS:  Xray bl hands 4/29/22   Impression:   1. Mild stable degenerative changes of the left carpal bones.  Otherwise normal evaluation of the hands.     Comments: I have personally reviewed the imaging and I agree with the above radiologist's report.    EMG 4/1/22   Impression   This is an abnormal study. There is electrophysiologic evidence of:   1. Severe bilateral carpal tunnel syndrome (median neuropathy at the wrist) comparatively much worse on the right, where there is secondary acute or ongoing denervation and chronic denervation in the APB muscle.         ASSESSMENT/PLAN:       ICD-10-CM ICD-9-CM   1. Bilateral carpal tunnel syndrome  G56.03 354.0     Plan:   EMG reviewed with pt discussed severe carpal tunnel. Treatment options discussed. Pt wishes to proceed with a right carpal tunnel release. Consents reviewed and signed in clinic. Discussed weakness and numbness may not improve due to significant nerve change pt understand. RTC PO         The patient indicates understanding of these issues and agrees to the plan.    Angie Feng PA-C  Hand Clinic   [facility]  [place], [place]

## 2022-04-30 ENCOUNTER — OFFICE VISIT (OUTPATIENT)
Dept: URGENT CARE | Facility: CLINIC | Age: 47
End: 2022-04-30
Payer: COMMERCIAL

## 2022-04-30 VITALS
RESPIRATION RATE: 18 BRPM | TEMPERATURE: 99 F | DIASTOLIC BLOOD PRESSURE: 90 MMHG | WEIGHT: 216 LBS | HEIGHT: 67 IN | SYSTOLIC BLOOD PRESSURE: 146 MMHG | OXYGEN SATURATION: 98 % | BODY MASS INDEX: 33.9 KG/M2 | HEART RATE: 69 BPM

## 2022-04-30 DIAGNOSIS — M25.572 ACUTE LEFT ANKLE PAIN: ICD-10-CM

## 2022-04-30 DIAGNOSIS — S93.402A SPRAIN OF LEFT ANKLE, UNSPECIFIED LIGAMENT, INITIAL ENCOUNTER: Primary | ICD-10-CM

## 2022-04-30 PROCEDURE — 1160F PR REVIEW ALL MEDS BY PRESCRIBER/CLIN PHARMACIST DOCUMENTED: ICD-10-PCS | Mod: CPTII,S$GLB,,

## 2022-04-30 PROCEDURE — 3077F PR MOST RECENT SYSTOLIC BLOOD PRESSURE >= 140 MM HG: ICD-10-PCS | Mod: CPTII,S$GLB,,

## 2022-04-30 PROCEDURE — 73610 X-RAY EXAM OF ANKLE: CPT | Mod: FY,LT,S$GLB, | Performed by: RADIOLOGY

## 2022-04-30 PROCEDURE — 4010F ACE/ARB THERAPY RXD/TAKEN: CPT | Mod: CPTII,S$GLB,,

## 2022-04-30 PROCEDURE — 99213 PR OFFICE/OUTPT VISIT, EST, LEVL III, 20-29 MIN: ICD-10-PCS | Mod: S$GLB,,,

## 2022-04-30 PROCEDURE — 4010F PR ACE/ARB THEARPY RXD/TAKEN: ICD-10-PCS | Mod: CPTII,S$GLB,,

## 2022-04-30 PROCEDURE — 99213 OFFICE O/P EST LOW 20 MIN: CPT | Mod: S$GLB,,,

## 2022-04-30 PROCEDURE — 1159F PR MEDICATION LIST DOCUMENTED IN MEDICAL RECORD: ICD-10-PCS | Mod: CPTII,S$GLB,,

## 2022-04-30 PROCEDURE — 1159F MED LIST DOCD IN RCRD: CPT | Mod: CPTII,S$GLB,,

## 2022-04-30 PROCEDURE — 73610 XR ANKLE COMPLETE 3 VIEW LEFT: ICD-10-PCS | Mod: FY,LT,S$GLB, | Performed by: RADIOLOGY

## 2022-04-30 PROCEDURE — 1160F RVW MEDS BY RX/DR IN RCRD: CPT | Mod: CPTII,S$GLB,,

## 2022-04-30 PROCEDURE — 3008F PR BODY MASS INDEX (BMI) DOCUMENTED: ICD-10-PCS | Mod: CPTII,S$GLB,,

## 2022-04-30 PROCEDURE — 3080F PR MOST RECENT DIASTOLIC BLOOD PRESSURE >= 90 MM HG: ICD-10-PCS | Mod: CPTII,S$GLB,,

## 2022-04-30 PROCEDURE — 3008F BODY MASS INDEX DOCD: CPT | Mod: CPTII,S$GLB,,

## 2022-04-30 PROCEDURE — 3080F DIAST BP >= 90 MM HG: CPT | Mod: CPTII,S$GLB,,

## 2022-04-30 PROCEDURE — 3077F SYST BP >= 140 MM HG: CPT | Mod: CPTII,S$GLB,,

## 2022-04-30 RX ORDER — NAPROXEN 500 MG/1
500 TABLET ORAL 2 TIMES DAILY
Qty: 20 TABLET | Refills: 0 | Status: SHIPPED | OUTPATIENT
Start: 2022-04-30 | End: 2022-05-10

## 2022-04-30 NOTE — PROGRESS NOTES
"Subjective:       Patient ID: Talon Leblanc Jr. is a 47 y.o. male.    Vitals:  height is 5' 7" (1.702 m) and weight is 98 kg (216 lb). His temperature is 98.5 °F (36.9 °C). His blood pressure is 146/90 (abnormal) and his pulse is 69. His respiration is 18 and oxygen saturation is 98%.     Chief Complaint: Ankle Pain    47-year-old male complains left ankle pain and swelling.  Patient states that about a day or so ago he got up from his chair at work and twisted his ankle.  He denies any numbness or tingling, muscle weakness, loss of sensation.  Patient states that it is painful with weight-bearing activity and walking.  He has not taken anything for symptoms.    Ankle Pain   The incident occurred 12 to 24 hours ago. The injury mechanism is unknown. The pain is present in the left ankle. The quality of the pain is described as aching. The pain is at a severity of 9/10. The pain is severe. The pain has been constant since onset. Associated symptoms include an inability to bear weight. Pertinent negatives include no loss of motion, loss of sensation, muscle weakness, numbness or tingling. He reports no foreign bodies present. The symptoms are aggravated by movement and weight bearing. He has tried nothing for the symptoms.       Musculoskeletal: Positive for joint pain, joint swelling and pain with walking.   Neurological: Negative for numbness.       Objective:      Physical Exam   Constitutional: He is oriented to person, place, and time. He appears well-developed. He is cooperative.  Non-toxic appearance. He does not appear ill. No distress.   HENT:   Head: Normocephalic and atraumatic. Head is without abrasion, without contusion and without laceration.   Ears:   Right Ear: Hearing, tympanic membrane, external ear and ear canal normal. No hemotympanum.   Left Ear: Hearing, tympanic membrane, external ear and ear canal normal. No hemotympanum.   Nose: Nose normal. No mucosal edema, rhinorrhea or nasal " deformity. No epistaxis. Right sinus exhibits no maxillary sinus tenderness and no frontal sinus tenderness. Left sinus exhibits no maxillary sinus tenderness and no frontal sinus tenderness.   Mouth/Throat: Uvula is midline, oropharynx is clear and moist and mucous membranes are normal. No trismus in the jaw. Normal dentition. No uvula swelling. No posterior oropharyngeal erythema.   Eyes: Conjunctivae, EOM and lids are normal. Pupils are equal, round, and reactive to light. Right eye exhibits no discharge. Left eye exhibits no discharge. No scleral icterus.   Neck: Trachea normal and phonation normal. Neck supple. No tracheal deviation present. No neck rigidity present. No spinous process tenderness present. No muscular tenderness present.   Cardiovascular: Normal rate, regular rhythm, normal heart sounds and normal pulses.   Pulses:       Dorsalis pedis pulses are 2+ on the right side and 2+ on the left side.   Pulmonary/Chest: Effort normal and breath sounds normal. No respiratory distress.   Abdominal: Normal appearance and bowel sounds are normal. He exhibits no distension, no pulsatile midline mass and no mass. Soft. There is no abdominal tenderness.   Musculoskeletal:         General: No deformity.      Left ankle: He exhibits decreased range of motion (secondary to pain. increased pain with inversion of the ankle joint), swelling and ecchymosis. He exhibits normal pulse. Tenderness. Medial malleolus tenderness found. Achilles tendon normal. Achilles tendon exhibits no defect and normal Dewitt's test results.   Neurological: He is alert and oriented to person, place, and time. He has normal strength. No cranial nerve deficit or sensory deficit. He exhibits normal muscle tone. He displays no seizure activity. Coordination normal. GCS eye subscore is 4. GCS verbal subscore is 5. GCS motor subscore is 6.   Skin: Skin is warm, dry, intact, not diaphoretic and not pale. Capillary refill takes less than 2  seconds. No abrasion, No burn, No bruising and No ecchymosis   Psychiatric: His speech is normal and behavior is normal. Judgment and thought content normal.   Nursing note and vitals reviewed.        XR ANKLE COMPLETE 3 VIEW LEFT    Result Date: 4/30/2022  EXAMINATION: XR ANKLE COMPLETE 3 VIEW LEFT CLINICAL HISTORY: Pain in left ankle and joints of left foot TECHNIQUE: AP, lateral and oblique views of the left ankle were performed. COMPARISON: None FINDINGS: The bone mineralization is within normal limits.  There is no cortical step-off.  There is no cortical step-off. The joint spaces are maintained.  The soft tissues are unremarkable.  No radiopaque foreign body is identified. There is no evidence of a fracture or dislocation of the left ankle.     No acute process. Electronically signed by: Zia Ervin MD Date:    04/30/2022 Time:    15:25        Assessment:       1. Sprain of left ankle, unspecified ligament, initial encounter    2. Acute left ankle pain          Plan:       Discussed and reviewed imaging results with patient.  Personally reviewed x-rays of ankle which showed no acute fracture dislocation.  Recommended RICE protocol for ankle sprain.  Follow-up with Ortho as needed for ankle pain.  Patient can take naproxen as needed for pain and swelling.  Patient verbalizes understanding and agrees with treatment plan.     Sprain of left ankle, unspecified ligament, initial encounter  -     Ambulatory referral/consult to Orthopedics  -     NON-PNEUMATIC WALKING BOOT FOR HOME USE  -     naproxen (NAPROSYN) 500 MG tablet; Take 1 tablet (500 mg total) by mouth 2 (two) times daily. for 10 days  Dispense: 20 tablet; Refill: 0    Acute left ankle pain  -     XR ANKLE COMPLETE 3 VIEW LEFT; Future; Expected date: 04/30/2022  -     Ambulatory referral/consult to Orthopedics

## 2022-04-30 NOTE — LETTER
April 30, 2022      [facility] [place]  [address]  [place] [address]  Phone: [phone]  Fax: [phone]       Patient: Talon Leblanc   YOB: 1975  Date of Visit: 04/30/2022    To Whom It May Concern:    Brionna Leblanc  was at [facility] on 04/30/2022. The patient may return to work on 5/2/2022 with no restrictions. If you have any questions or concerns, or if I can be of further assistance, please do not hesitate to contact me.    Sincerely,    Suni Diehl PA-C

## 2022-05-03 DIAGNOSIS — G56.03 BILATERAL CARPAL TUNNEL SYNDROME: Primary | ICD-10-CM

## 2022-05-16 DIAGNOSIS — G56.00 CARPAL TUNNEL SYNDROME: ICD-10-CM

## 2022-05-16 DIAGNOSIS — G56.03 BILATERAL CARPAL TUNNEL SYNDROME: Primary | ICD-10-CM

## 2022-05-17 ENCOUNTER — TELEPHONE (OUTPATIENT)
Dept: ORTHOPEDICS | Facility: CLINIC | Age: 47
End: 2022-05-17
Payer: COMMERCIAL

## 2022-05-17 DIAGNOSIS — Z98.890 POST-OPERATIVE STATE: Primary | ICD-10-CM

## 2022-05-17 RX ORDER — TRAMADOL HYDROCHLORIDE 50 MG/1
50 TABLET ORAL EVERY 6 HOURS PRN
Qty: 10 TABLET | Refills: 0 | Status: SHIPPED | OUTPATIENT
Start: 2022-05-17 | End: 2023-04-28

## 2022-05-17 NOTE — TELEPHONE ENCOUNTER
CEASAR dillon pt. Informed pt of 6:00 a.m arrival time for 05/18/22 surgery at the [facility]. Reminded pt of NPO status. Pt expressed understanding & was thankful.       GERRY sent

## 2022-05-17 NOTE — PRE ADMISSION SCREENING
Pre admit phone call completed.    Instructions given to patient about NPO status as follows:     The evening before surgery do not eat anything after 9 p.m. ( this includes hard candy, chewing gum and mints).  You may only have GATORADE, POWERADE AND WATER from 9 p.m. until you leave your home. DO NOT  DRINK ANY LIQUIDS ON THE WAY TO THE HOSPITAL.      Patient was also instructed on the below information:    Park in the Parking lot behind the hospital or in the [employer] [facility] across the street from the parking lot.  Parking is complimentary.  If you will be discharged the same day as your procedure, please arrange for a responsible adult to drive you home or  to accompany you if traveling by taxi.  YOU WILL NOT BE PERMITTED TO DRIVE OR TO LEAVE THE HOSPITAL ALONE AFTER SURGERY.  It is strongly recommended that you arrange for someone to remain with you for the first 24 hrs following your surgery.    Patient verbalized understanding of above instructions.

## 2022-05-18 ENCOUNTER — ANESTHESIA (OUTPATIENT)
Dept: SURGERY | Facility: OTHER | Age: 47
End: 2022-05-18
Payer: COMMERCIAL

## 2022-05-18 ENCOUNTER — ANESTHESIA EVENT (OUTPATIENT)
Dept: SURGERY | Facility: OTHER | Age: 47
End: 2022-05-18
Payer: COMMERCIAL

## 2022-05-18 ENCOUNTER — HOSPITAL ENCOUNTER (OUTPATIENT)
Facility: OTHER | Age: 47
Discharge: HOME | End: 2022-05-18
Attending: ORTHOPAEDIC SURGERY | Admitting: ORTHOPAEDIC SURGERY
Payer: COMMERCIAL

## 2022-05-18 VITALS
SYSTOLIC BLOOD PRESSURE: 135 MMHG | WEIGHT: 215 LBS | TEMPERATURE: 98 F | HEART RATE: 77 BPM | RESPIRATION RATE: 16 BRPM | HEIGHT: 67 IN | BODY MASS INDEX: 33.74 KG/M2 | OXYGEN SATURATION: 99 % | DIASTOLIC BLOOD PRESSURE: 76 MMHG

## 2022-05-18 DIAGNOSIS — G56.00 CARPAL TUNNEL SYNDROME: Primary | ICD-10-CM

## 2022-05-18 DIAGNOSIS — G56.03 BILATERAL CARPAL TUNNEL SYNDROME: ICD-10-CM

## 2022-05-18 PROCEDURE — 63600175 PHARM REV CODE 636 W HCPCS: Performed by: NURSE ANESTHETIST, CERTIFIED REGISTERED

## 2022-05-18 PROCEDURE — 71000015 HC POSTOP RECOV 1ST HR: Performed by: ORTHOPAEDIC SURGERY

## 2022-05-18 PROCEDURE — 25000003 PHARM REV CODE 250: Performed by: ORTHOPAEDIC SURGERY

## 2022-05-18 PROCEDURE — 64721 CARPAL TUNNEL SURGERY: CPT | Mod: RT,,, | Performed by: ORTHOPAEDIC SURGERY

## 2022-05-18 PROCEDURE — 37000009 HC ANESTHESIA EA ADD 15 MINS: Performed by: ORTHOPAEDIC SURGERY

## 2022-05-18 PROCEDURE — 63600175 PHARM REV CODE 636 W HCPCS: Performed by: STUDENT IN AN ORGANIZED HEALTH CARE EDUCATION/TRAINING PROGRAM

## 2022-05-18 PROCEDURE — 25000003 PHARM REV CODE 250: Performed by: NURSE ANESTHETIST, CERTIFIED REGISTERED

## 2022-05-18 PROCEDURE — 36000706: Performed by: ORTHOPAEDIC SURGERY

## 2022-05-18 PROCEDURE — 64721 PR REVISE MEDIAN N/CARPAL TUNNEL SURG: ICD-10-PCS | Mod: RT,,, | Performed by: ORTHOPAEDIC SURGERY

## 2022-05-18 PROCEDURE — 36000707: Performed by: ORTHOPAEDIC SURGERY

## 2022-05-18 PROCEDURE — 37000008 HC ANESTHESIA 1ST 15 MINUTES: Performed by: ORTHOPAEDIC SURGERY

## 2022-05-18 PROCEDURE — 71000016 HC POSTOP RECOV ADDL HR: Performed by: ORTHOPAEDIC SURGERY

## 2022-05-18 RX ORDER — BUPIVACAINE HYDROCHLORIDE 2.5 MG/ML
INJECTION, SOLUTION EPIDURAL; INFILTRATION; INTRACAUDAL
Status: DISCONTINUED | OUTPATIENT
Start: 2022-05-18 | End: 2022-05-18 | Stop reason: HOSPADM

## 2022-05-18 RX ORDER — MIDAZOLAM HYDROCHLORIDE 1 MG/ML
INJECTION INTRAMUSCULAR; INTRAVENOUS
Status: DISCONTINUED | OUTPATIENT
Start: 2022-05-18 | End: 2022-05-18

## 2022-05-18 RX ORDER — CEFAZOLIN SODIUM 1 G/3ML
2 INJECTION, POWDER, FOR SOLUTION INTRAMUSCULAR; INTRAVENOUS
Status: COMPLETED | OUTPATIENT
Start: 2022-05-18 | End: 2022-05-18

## 2022-05-18 RX ORDER — SODIUM CHLORIDE 0.9 % (FLUSH) 0.9 %
3 SYRINGE (ML) INJECTION
Status: DISCONTINUED | OUTPATIENT
Start: 2022-05-18 | End: 2022-05-18 | Stop reason: HOSPADM

## 2022-05-18 RX ORDER — OXYCODONE HYDROCHLORIDE 5 MG/1
5 TABLET ORAL
Status: CANCELLED | OUTPATIENT
Start: 2022-05-18

## 2022-05-18 RX ORDER — PROPOFOL 10 MG/ML
VIAL (ML) INTRAVENOUS CONTINUOUS PRN
Status: DISCONTINUED | OUTPATIENT
Start: 2022-05-18 | End: 2022-05-18

## 2022-05-18 RX ORDER — PROCHLORPERAZINE EDISYLATE 5 MG/ML
5 INJECTION INTRAMUSCULAR; INTRAVENOUS EVERY 30 MIN PRN
Status: CANCELLED | OUTPATIENT
Start: 2022-05-18

## 2022-05-18 RX ORDER — BACITRACIN ZINC 500 UNIT/G
OINTMENT (GRAM) TOPICAL
Status: DISCONTINUED | OUTPATIENT
Start: 2022-05-18 | End: 2022-05-18 | Stop reason: HOSPADM

## 2022-05-18 RX ORDER — LIDOCAINE HCL/PF 100 MG/5ML
SYRINGE (ML) INTRAVENOUS
Status: DISCONTINUED | OUTPATIENT
Start: 2022-05-18 | End: 2022-05-18

## 2022-05-18 RX ORDER — LIDOCAINE HYDROCHLORIDE 10 MG/ML
INJECTION, SOLUTION EPIDURAL; INFILTRATION; INTRACAUDAL; PERINEURAL
Status: DISCONTINUED | OUTPATIENT
Start: 2022-05-18 | End: 2022-05-18 | Stop reason: HOSPADM

## 2022-05-18 RX ORDER — DIPHENHYDRAMINE HYDROCHLORIDE 50 MG/ML
25 INJECTION INTRAMUSCULAR; INTRAVENOUS EVERY 6 HOURS PRN
Status: CANCELLED | OUTPATIENT
Start: 2022-05-18

## 2022-05-18 RX ORDER — MEPERIDINE HYDROCHLORIDE 25 MG/ML
12.5 INJECTION INTRAMUSCULAR; INTRAVENOUS; SUBCUTANEOUS ONCE AS NEEDED
Status: CANCELLED | OUTPATIENT
Start: 2022-05-18 | End: 2022-05-19

## 2022-05-18 RX ORDER — HYDROMORPHONE HYDROCHLORIDE 2 MG/ML
0.4 INJECTION, SOLUTION INTRAMUSCULAR; INTRAVENOUS; SUBCUTANEOUS EVERY 5 MIN PRN
Status: CANCELLED | OUTPATIENT
Start: 2022-05-18

## 2022-05-18 RX ADMIN — PROPOFOL 75 MCG/KG/MIN: 10 INJECTION, EMULSION INTRAVENOUS at 07:05

## 2022-05-18 RX ADMIN — LIDOCAINE HYDROCHLORIDE 20 MG: 20 INJECTION, SOLUTION INTRAVENOUS at 07:05

## 2022-05-18 RX ADMIN — CEFAZOLIN 2 G: 330 INJECTION, POWDER, FOR SOLUTION INTRAMUSCULAR; INTRAVENOUS at 07:05

## 2022-05-18 RX ADMIN — SODIUM CHLORIDE, SODIUM LACTATE, POTASSIUM CHLORIDE, AND CALCIUM CHLORIDE: .6; .31; .03; .02 INJECTION, SOLUTION INTRAVENOUS at 07:05

## 2022-05-18 RX ADMIN — MIDAZOLAM HYDROCHLORIDE 2 MG: 1 INJECTION, SOLUTION INTRAMUSCULAR; INTRAVENOUS at 07:05

## 2022-05-18 NOTE — ANESTHESIA PREPROCEDURE EVALUATION
05/18/2022  Talon Leblanc Jr. is a 47 y.o., male.      Pre-op Assessment    I have reviewed the Patient Summary Reports.     I have reviewed the Nursing Notes.    I have reviewed the Medications.     Review of Systems  Anesthesia Hx:  No problems with previous Anesthesia  Denies Family Hx of Anesthesia complications.   Denies Personal Hx of Anesthesia complications.   Social:  Non-Smoker    Hematology/Oncology:  Hematology Normal   Oncology Normal     EENT/Dental:EENT/Dental Normal   Cardiovascular:   Hypertension    Pulmonary:  Pulmonary Normal    Renal/:  Renal/ Normal     Hepatic/GI:  Hepatic/GI Normal    Musculoskeletal:  Musculoskeletal Normal    Neurological:  Neurology Normal Right CTS   Endocrine:  Endocrine Normal  Obesity / BMI > 30  Dermatological:  Skin Normal    Psych:  Psychiatric Normal           Physical Exam  General: Well nourished and Alert    Airway:  Mallampati: II   Mouth Opening: Normal  Tongue: Normal    Dental:  Intact        Anesthesia Plan  Type of Anesthesia, risks & benefits discussed:    Anesthesia Type: MAC  Intra-op Monitoring Plan: Standard ASA Monitors  Post Op Pain Control Plan: multimodal analgesia  Informed Consent: Informed consent signed with the Patient and all parties understand the risks and agree with anesthesia plan.  All questions answered.   ASA Score: 2    Ready For Surgery From Anesthesia Perspective.     .

## 2022-05-18 NOTE — OP NOTE
[facility]  Surgery Department  Operative Note    SUMMARY     Date of Procedure: 5/18/2022     Procedure: Procedure(s) (LRB):  RELEASE, CARPAL TUNNEL,RIGHT (Right)     Surgeon(s) and Role:     * Elicia Mckinney MD - Primary    Assisting Surgeon: None    Pre-Operative Diagnosis: Bilateral carpal tunnel syndrome [G56.03]    Post-Operative Diagnosis: Post-Op Diagnosis Codes:     * Bilateral carpal tunnel syndrome [G56.03]    Anesthesia: Local MAC    Technical Procedures Used: surgery    Description of the Findings of the Procedure:IMPLANTS: None.   SPECIMENS: None.   COMPLICATIONS: None.   INDICATIONS FOR PROCEDURE: Mr. Leblanc is a 47-year-old male who had numbness   and tingling into her right hand. She has failed conservative treatment, EMG   was positive for carpal tunnel syndrome. Therefore, operative intervention was   deemed necessary. Risks and benefits were explained to the patient in clinic   since performed in clinic.   PROCEDURE IN DETAIL: After correct site was marked with the patient's   participation in the holding area, the patient was brought to the Operating   Room, placed in supine position, underwent MAC anesthesia. A well-padded   nonsterile tourniquet was placed on the right arm. A time-out was called for   correct site, procedure and patient to be indicated. Under sterile conditions,   an injection of lidocaine 1% was injected into the carpal tunnel space. The arm   was prepped and draped in normal sterile fashion. The incision was marked out   using Horne cardinal lines. The arm was exsanguinated using Esmarch.   Tourniquet was insufflated to 250 mmHg and that is where it remained for a total   of 10 minutes. The incision was made down to the palmar fascia. The palmar   fascia was sharply incised. The transverse ligament was identified. It was   very thick in nature. It was sharply incised. Median nerve was identified. A   Mosquito hemostat was passed from the  undersurface of the transverse ligament   proximally and distally to free it from the median nerve. Metzenbaum scissors   were utilized to cut the transverse ligament proximally and distally. Once that   was completely released, a Mosquito hemostat was passed again to confirm a   complete release. Once that was performed, the area was irrigated with copious   amounts of normal saline. Nylon closed the skin. Sterile dressing was applied.   Tourniquet was deflated. Brisk capillary refill ensued. The patient was   transported to the Recovery Room in stable condition.   POSTOPERATIVE PLAN FOR THIS PATIENT: She is to keep her dressing clean, dry and   intact. We will see her back in 2 weeks for stitch removal.      Significant Surgical Tasks Conducted by the Assistant(s), if Applicable: retraction    Complications: No    Estimated Blood Loss (EBL): * No values recorded between 5/18/2022  8:07 AM and 5/18/2022  8:30 AM *           Implants: * No implants in log *    Specimens:   Specimen (24h ago, onward)            None                  Condition: Good    Disposition: PACU - hemodynamically stable.    Attestation: I performed the procedure.    Discharge Note    SUMMARY     Admit Date: 5/18/2022    Discharge Date and Time: 5/18/2022 10:02 AM    Hospital Course (synopsis of major diagnoses, care, treatment, and services provided during the course of the hospital stay): surgery     Final Diagnosis: Post-Op Diagnosis Codes:     * Bilateral carpal tunnel syndrome [G56.03]    Disposition: Home or Self Care    Follow Up/Patient Instructions:     Medications:  Reconciled Home Medications:      Medication List      CONTINUE taking these medications    latanoprost 0.005 % ophthalmic solution  Inject 2 drops into the eye Daily.     losartan 25 MG tablet  Commonly known as: COZAAR  Take 1 tablet (25 mg total) by mouth once daily.     rosuvastatin 10 MG tablet  Commonly known as: CRESTOR  Take 1 tablet (10 mg total) by mouth once  daily.     traMADoL 50 mg tablet  Commonly known as: ULTRAM  Take 1 tablet (50 mg total) by mouth every 6 (six) hours as needed for Pain.          Discharge Procedure Orders   Diet Adult Regular     Lifting restrictions   Order Comments: No lifting >5 lbs     Notify your health care provider if you experience any of the following:  increased confusion or weakness     Notify your health care provider if you experience any of the following:  persistent dizziness, light-headedness, or visual disturbances     Notify your health care provider if you experience any of the following:  worsening rash     Notify your health care provider if you experience any of the following:  severe persistent headache     Notify your health care provider if you experience any of the following:  difficulty breathing or increased cough     Notify your health care provider if you experience any of the following:  redness, tenderness, or signs of infection (pain, swelling, redness, odor or green/yellow discharge around incision site)     Notify your health care provider if you experience any of the following:  severe uncontrolled pain     Notify your health care provider if you experience any of the following:  persistent nausea and vomiting or diarrhea     Notify your health care provider if you experience any of the following:  temperature >100.4     Leave dressing on - Keep it clean, dry, and intact until clinic visit      Follow-up Information     Elicia Mckinney MD Follow up in 2 week(s).    Specialties: Hand Surgery, Orthopedic Surgery  Contact information:  [address]  [facility] [address]  [facility]  [facility] [id number] [phone]

## 2022-05-18 NOTE — BRIEF OP NOTE
[facility] - Surgery ([place])  Brief Operative Note    Surgery Date: 5/18/2022     Surgeon(s) and Role:     * Elicia Mckinney MD - Primary    Assisting Surgeon: None    Pre-op Diagnosis:  Bilateral carpal tunnel syndrome [G56.03]    Post-op Diagnosis:  Post-Op Diagnosis Codes:     * Bilateral carpal tunnel syndrome [G56.03]    Procedure(s) (LRB):  RELEASE, CARPAL TUNNEL,RIGHT (Right)    Anesthesia: Local MAC    Operative Findings: see op note    Estimated Blood Loss: * No values recorded between 5/18/2022  8:07 AM and 5/18/2022  8:31 AM *         Specimens:   Specimen (24h ago, onward)            None            Discharge Note    OUTCOME: Patient tolerated treatment/procedure well without complication and is now ready for discharge.    DISPOSITION: Home or Self Care    FINAL DIAGNOSIS:  <principal problem not specified>    FOLLOWUP: In clinic    DISCHARGE INSTRUCTIONS:    Discharge Procedure Orders   Diet Adult Regular     Lifting restrictions   Order Comments: No lifting >5 lbs     Notify your health care provider if you experience any of the following:  increased confusion or weakness     Notify your health care provider if you experience any of the following:  persistent dizziness, light-headedness, or visual disturbances     Notify your health care provider if you experience any of the following:  worsening rash     Notify your health care provider if you experience any of the following:  severe persistent headache     Notify your health care provider if you experience any of the following:  difficulty breathing or increased cough     Notify your health care provider if you experience any of the following:  redness, tenderness, or signs of infection (pain, swelling, redness, odor or green/yellow discharge around incision site)     Notify your health care provider if you experience any of the following:  severe uncontrolled pain     Notify your health care provider if you experience any of the following:   persistent nausea and vomiting or diarrhea     Notify your health care provider if you experience any of the following:  temperature >100.4     Leave dressing on - Keep it clean, dry, and intact until clinic visit

## 2022-05-18 NOTE — ANESTHESIA POSTPROCEDURE EVALUATION
Anesthesia Post Evaluation    Patient: Talon Leblanc Jr.    Procedure(s) Performed: Procedure(s) (LRB):  RELEASE, CARPAL TUNNEL,RIGHT (Right)    Final Anesthesia Type: MAC      Patient location during evaluation: [facility]  Patient participation: Yes- Able to Participate  Level of consciousness: awake and alert, awake and oriented  Post-procedure vital signs: reviewed and stable  Pain management: adequate  Airway patency: patent    PONV status at discharge: No PONV  Anesthetic complications: no      Cardiovascular status: blood pressure returned to baseline, hemodynamically stable and stable  Respiratory status: spontaneous ventilation, unassisted and room air  Hydration status: euvolemic  Follow-up not needed.          Vitals Value Taken Time   /90 05/18/22 0630   Temp 36.6 °C (97.9 °F) 05/18/22 0630   Pulse 66 05/18/22 0630   Resp 18 05/18/22 0630   SpO2 99 % 05/18/22 0630         No case tracking events are documented in the log.      Pain/Yessica Score: No data recorded

## 2022-05-19 ENCOUNTER — DOCUMENTATION ONLY (OUTPATIENT)
Dept: ORTHOPEDICS | Facility: CLINIC | Age: 47
End: 2022-05-19
Payer: COMMERCIAL

## 2022-05-19 NOTE — PROGRESS NOTES
Pt arrived in clinic today with STD/FMLA papers in hand. His paper work was faxed with confirmation to disability 05/19/22. Informed pt that the turn around time was 7-10 days for processing. Patient verbalized understanding and was thankful.

## 2022-05-30 ENCOUNTER — TELEPHONE (OUTPATIENT)
Dept: ORTHOPEDICS | Facility: CLINIC | Age: 47
End: 2022-05-30
Payer: COMMERCIAL

## 2022-06-01 ENCOUNTER — OFFICE VISIT (OUTPATIENT)
Dept: ORTHOPEDICS | Facility: CLINIC | Age: 47
End: 2022-06-01
Payer: COMMERCIAL

## 2022-06-01 VITALS — WEIGHT: 215 LBS | HEIGHT: 67 IN | BODY MASS INDEX: 33.74 KG/M2

## 2022-06-01 DIAGNOSIS — Z98.890 S/P CARPAL TUNNEL RELEASE: Primary | ICD-10-CM

## 2022-06-01 PROCEDURE — 3008F BODY MASS INDEX DOCD: CPT | Mod: CPTII,S$GLB,, | Performed by: PHYSICIAN ASSISTANT

## 2022-06-01 PROCEDURE — 99024 PR POST-OP FOLLOW-UP VISIT: ICD-10-PCS | Mod: S$GLB,,, | Performed by: PHYSICIAN ASSISTANT

## 2022-06-01 PROCEDURE — 99999 PR PBB SHADOW E&M-EST. PATIENT-LVL III: CPT | Mod: PBBFAC,,, | Performed by: PHYSICIAN ASSISTANT

## 2022-06-01 PROCEDURE — 1159F PR MEDICATION LIST DOCUMENTED IN MEDICAL RECORD: ICD-10-PCS | Mod: CPTII,S$GLB,, | Performed by: PHYSICIAN ASSISTANT

## 2022-06-01 PROCEDURE — 4010F PR ACE/ARB THEARPY RXD/TAKEN: ICD-10-PCS | Mod: CPTII,S$GLB,, | Performed by: PHYSICIAN ASSISTANT

## 2022-06-01 PROCEDURE — 99024 POSTOP FOLLOW-UP VISIT: CPT | Mod: S$GLB,,, | Performed by: PHYSICIAN ASSISTANT

## 2022-06-01 PROCEDURE — 99999 PR PBB SHADOW E&M-EST. PATIENT-LVL III: ICD-10-PCS | Mod: PBBFAC,,, | Performed by: PHYSICIAN ASSISTANT

## 2022-06-01 PROCEDURE — 1159F MED LIST DOCD IN RCRD: CPT | Mod: CPTII,S$GLB,, | Performed by: PHYSICIAN ASSISTANT

## 2022-06-01 PROCEDURE — 4010F ACE/ARB THERAPY RXD/TAKEN: CPT | Mod: CPTII,S$GLB,, | Performed by: PHYSICIAN ASSISTANT

## 2022-06-01 PROCEDURE — 3008F PR BODY MASS INDEX (BMI) DOCUMENTED: ICD-10-PCS | Mod: CPTII,S$GLB,, | Performed by: PHYSICIAN ASSISTANT

## 2022-06-01 NOTE — PROGRESS NOTES
"Mr. Leblanc is here today for a post-operative visit.  He is 14 days status post right carpal tunnel release by Dr. Mckinney on 5/18/22. He reports that he is doing well. Pain is minimal. He does still have some numbness in the finger tips.  He denies fever, chills, and sweats since the time of the surgery.     Physical exam:    Vitals:    06/01/22 0854   Weight: 97.5 kg (215 lb)   Height: 5' 7" (1.702 m)   PainSc:   2     Vital signs are stable, patient is afebrile.  Patient is well dressed and well groomed, no acute distress.  Alert and oriented to person, place, and time.  Post op dressing taken down.  Incision is clean, dry and intact.  There is no erythema or exudate.  There is no sign of any infection. He is NVI. Sutures removed without difficulty. Good ROM.    Assessment: status post right carpal tunnel release by Dr. Mckinney on 5/18/22    Plan:  Talon was seen today for post-op evaluation.    Diagnoses and all orders for this visit:    S/P carpal tunnel release    PO instruction reviewed and provided to patient  Brace prn activity  We discussed numbness, severe carpal tunnel   RTC 2 wks for discussion of left carpal tunnel release      "

## 2022-06-07 ENCOUNTER — PATIENT MESSAGE (OUTPATIENT)
Dept: ORTHOPEDICS | Facility: CLINIC | Age: 47
End: 2022-06-07
Payer: COMMERCIAL

## 2022-06-07 DIAGNOSIS — R52 PAIN: Primary | ICD-10-CM

## 2022-06-14 ENCOUNTER — TELEPHONE (OUTPATIENT)
Dept: ORTHOPEDICS | Facility: CLINIC | Age: 47
End: 2022-06-14
Payer: COMMERCIAL

## 2022-06-15 ENCOUNTER — OFFICE VISIT (OUTPATIENT)
Dept: ORTHOPEDICS | Facility: CLINIC | Age: 47
End: 2022-06-15
Payer: COMMERCIAL

## 2022-06-15 VITALS — WEIGHT: 215 LBS | HEIGHT: 67 IN | BODY MASS INDEX: 33.74 KG/M2

## 2022-06-15 DIAGNOSIS — G56.02 LEFT CARPAL TUNNEL SYNDROME: Primary | ICD-10-CM

## 2022-06-15 PROCEDURE — 99024 POSTOP FOLLOW-UP VISIT: CPT | Mod: S$GLB,,, | Performed by: PHYSICIAN ASSISTANT

## 2022-06-15 PROCEDURE — 3008F BODY MASS INDEX DOCD: CPT | Mod: CPTII,S$GLB,, | Performed by: PHYSICIAN ASSISTANT

## 2022-06-15 PROCEDURE — 4010F ACE/ARB THERAPY RXD/TAKEN: CPT | Mod: CPTII,S$GLB,, | Performed by: PHYSICIAN ASSISTANT

## 2022-06-15 PROCEDURE — 99999 PR PBB SHADOW E&M-EST. PATIENT-LVL III: ICD-10-PCS | Mod: PBBFAC,,, | Performed by: PHYSICIAN ASSISTANT

## 2022-06-15 PROCEDURE — 99214 OFFICE O/P EST MOD 30 MIN: CPT | Mod: 24,S$GLB,, | Performed by: PHYSICIAN ASSISTANT

## 2022-06-15 PROCEDURE — 99999 PR PBB SHADOW E&M-EST. PATIENT-LVL III: CPT | Mod: PBBFAC,,, | Performed by: PHYSICIAN ASSISTANT

## 2022-06-15 PROCEDURE — 99214 PR OFFICE/OUTPT VISIT, EST, LEVL IV, 30-39 MIN: ICD-10-PCS | Mod: 24,S$GLB,, | Performed by: PHYSICIAN ASSISTANT

## 2022-06-15 PROCEDURE — 1159F PR MEDICATION LIST DOCUMENTED IN MEDICAL RECORD: ICD-10-PCS | Mod: CPTII,S$GLB,, | Performed by: PHYSICIAN ASSISTANT

## 2022-06-15 PROCEDURE — 1159F MED LIST DOCD IN RCRD: CPT | Mod: CPTII,S$GLB,, | Performed by: PHYSICIAN ASSISTANT

## 2022-06-15 PROCEDURE — 4010F PR ACE/ARB THEARPY RXD/TAKEN: ICD-10-PCS | Mod: CPTII,S$GLB,, | Performed by: PHYSICIAN ASSISTANT

## 2022-06-15 PROCEDURE — 99024 PR POST-OP FOLLOW-UP VISIT: ICD-10-PCS | Mod: S$GLB,,, | Performed by: PHYSICIAN ASSISTANT

## 2022-06-15 PROCEDURE — 3008F PR BODY MASS INDEX (BMI) DOCUMENTED: ICD-10-PCS | Mod: CPTII,S$GLB,, | Performed by: PHYSICIAN ASSISTANT

## 2022-06-15 NOTE — PROGRESS NOTES
Subjective:      Patient ID: Talon Leblanc Jr. is a 47 y.o. male.    Chief Complaint: Pain of the Right Hand      HPI  Talon Leblanc Jr. is a left hand dominant 47 y.o. male presenting today for follow up. He is 1 month status post right carpal tunnel release, doing well. He reports numbness and tingling has improved from pre operatively though does still have distal finger tip paresthesias. He notes some soreness at the incision and is working on scar massage.     He continues to have left hand symptoms. He reports numbness, tingling, pain, and weakness in the bilateral hands. Onset about 10 years ago. Symptoms are location in the median nerve distribution. Numbness is constant. He has night time symptoms, symptoms awaken him. He has tried bracing with minimal relief. He has not had injections. EMG with severe bilateral carpal tunnel. 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      Review of patient's allergies indicates:  No Known Allergies      Current Outpatient Medications   Medication Sig Dispense Refill    latanoprost 0.005 % ophthalmic solution Inject 2 drops into the eye Daily.      losartan (COZAAR) 25 MG tablet Take 1 tablet (25 mg total) by mouth once daily. 90 tablet 3    rosuvastatin (CRESTOR) 10 MG tablet Take 1 tablet (10 mg total) by mouth once daily. 90 tablet 3    traMADoL (ULTRAM) 50 mg tablet Take 1 tablet (50 mg total) by mouth every 6 (six) hours as needed for Pain. 10 tablet 0     No current facility-administered medications for this visit.       Past Medical History:   Diagnosis Date    Glaucoma     Hypertension     Multiple thyroid nodules     Prediabetes        Past Surgical History:   Procedure Laterality Date    CARPAL TUNNEL RELEASE Right 5/18/2022    Procedure: RELEASE, CARPAL TUNNEL,RIGHT;  Surgeon: Elicia Mckinney MD;  [facility];  Service: Orthopedics;  Laterality: Right;    FRACTURE SURGERY      r femur fx w/ tatyana Right  "2017       Review of Systems:  Constitutional: Negative for chills and fever.   Respiratory: Negative for cough and shortness of breath.    Gastrointestinal: Negative for nausea and vomiting.   Skin: Negative for rash.   Neurological: Negative for dizziness and headaches.   Psychiatric/Behavioral: Negative for depression.   MSK as in HPI       OBJECTIVE:     PHYSICAL EXAM:  Ht 5' 7" (1.702 m)   Wt 97.5 kg (215 lb)   BMI 33.67 kg/m²     GEN:  NAD, well-developed, well-groomed.  NEURO: Awake, alert, and oriented. Normal attention and concentration.    PSYCH: Normal mood and affect. Behavior is normal.  HEENT: No cervical lymphadenopathy noted.  CARDIOVASCULAR: Radial pulses 2+ bilaterally. No LE edema noted.  PULMONARY: Breath sounds normal. No respiratory distress.  SKIN: Intact, no rashes.      MSK:   RUE:  Good active ROM of the wrist and fingers. Well healed prior carpal tunnel incision. No signs of infection.  AIN/PIN/Radial/Median/Ulnar Nerves assessed in isolation without deficit. Radial & Ulnar arteries palpated 2+. Capillary Refill <3s.    LUE   Good active ROM of the wrist and fingers. Positive tinels carpal tunnel. No change in already present numbness with durkans. Negative tinels elbow. Thenar atrophy.  AIN/PIN/Radial/Median/Ulnar Nerves assessed in isolation without deficit. Radial & Ulnar arteries palpated 2+. Capillary Refill <3s.    RADIOGRAPHS:  Xray bl hands 4/29/22   Impression:   1. Mild stable degenerative changes of the left carpal bones.  Otherwise normal evaluation of the hands.     Comments: I have personally reviewed the imaging and I agree with the above radiologist's report.    EMG 4/1/22   Impression   This is an abnormal study. There is electrophysiologic evidence of:   1. Severe bilateral carpal tunnel syndrome (median neuropathy at the wrist) comparatively much worse on the right, where there is secondary acute or ongoing denervation and chronic denervation in the APB muscle. "         ASSESSMENT/PLAN:       ICD-10-CM ICD-9-CM   1. Left carpal tunnel syndrome  G56.02 354.0     Plan:   EMG reviewed with pt discussed severe left carpal tunnel. Treatment options discussed. Pt wishes to proceed with a left carpal tunnel release. Consents reviewed and signed in clinic. Discussed weakness and numbness may not improve due to significant nerve change pt understand. RTC PO         The patient indicates understanding of these issues and agrees to the plan.    Angie Feng PA-C  Hand Clinic   [facility]  [place]

## 2022-06-16 ENCOUNTER — HOSPITAL ENCOUNTER (OUTPATIENT)
Dept: RADIOLOGY | Facility: HOSPITAL | Age: 47
Discharge: HOME | End: 2022-06-16
Attending: ORTHOPAEDIC SURGERY
Payer: COMMERCIAL

## 2022-06-16 ENCOUNTER — OFFICE VISIT (OUTPATIENT)
Dept: ORTHOPEDICS | Facility: CLINIC | Age: 47
End: 2022-06-16
Payer: COMMERCIAL

## 2022-06-16 VITALS — HEIGHT: 67 IN | BODY MASS INDEX: 33.74 KG/M2 | WEIGHT: 215 LBS

## 2022-06-16 DIAGNOSIS — R52 PAIN: ICD-10-CM

## 2022-06-16 DIAGNOSIS — G56.02 LEFT CARPAL TUNNEL SYNDROME: Primary | ICD-10-CM

## 2022-06-16 DIAGNOSIS — S93.402A INVERSION SPRAIN OF ANKLE, LEFT, INITIAL ENCOUNTER: Primary | ICD-10-CM

## 2022-06-16 PROCEDURE — 73630 X-RAY EXAM OF FOOT: CPT | Mod: 26,LT,, | Performed by: RADIOLOGY

## 2022-06-16 PROCEDURE — 99203 OFFICE O/P NEW LOW 30 MIN: CPT | Mod: S$GLB,,, | Performed by: ORTHOPAEDIC SURGERY

## 2022-06-16 PROCEDURE — 3008F PR BODY MASS INDEX (BMI) DOCUMENTED: ICD-10-PCS | Mod: CPTII,S$GLB,, | Performed by: ORTHOPAEDIC SURGERY

## 2022-06-16 PROCEDURE — 99999 PR PBB SHADOW E&M-EST. PATIENT-LVL II: ICD-10-PCS | Mod: PBBFAC,,, | Performed by: ORTHOPAEDIC SURGERY

## 2022-06-16 PROCEDURE — 4010F PR ACE/ARB THEARPY RXD/TAKEN: ICD-10-PCS | Mod: CPTII,S$GLB,, | Performed by: ORTHOPAEDIC SURGERY

## 2022-06-16 PROCEDURE — 73630 X-RAY EXAM OF FOOT: CPT | Mod: TC,LT

## 2022-06-16 PROCEDURE — 4010F ACE/ARB THERAPY RXD/TAKEN: CPT | Mod: CPTII,S$GLB,, | Performed by: ORTHOPAEDIC SURGERY

## 2022-06-16 PROCEDURE — 99203 PR OFFICE/OUTPT VISIT, NEW, LEVL III, 30-44 MIN: ICD-10-PCS | Mod: S$GLB,,, | Performed by: ORTHOPAEDIC SURGERY

## 2022-06-16 PROCEDURE — 73630 XR FOOT COMPLETE 3 VIEW LEFT: ICD-10-PCS | Mod: 26,LT,, | Performed by: RADIOLOGY

## 2022-06-16 PROCEDURE — 1159F PR MEDICATION LIST DOCUMENTED IN MEDICAL RECORD: ICD-10-PCS | Mod: CPTII,S$GLB,, | Performed by: ORTHOPAEDIC SURGERY

## 2022-06-16 PROCEDURE — 99999 PR PBB SHADOW E&M-EST. PATIENT-LVL II: CPT | Mod: PBBFAC,,, | Performed by: ORTHOPAEDIC SURGERY

## 2022-06-16 PROCEDURE — 3008F BODY MASS INDEX DOCD: CPT | Mod: CPTII,S$GLB,, | Performed by: ORTHOPAEDIC SURGERY

## 2022-06-16 PROCEDURE — 1159F MED LIST DOCD IN RCRD: CPT | Mod: CPTII,S$GLB,, | Performed by: ORTHOPAEDIC SURGERY

## 2022-06-16 NOTE — PROGRESS NOTES
Subjective:   Chief complaint: left ankle pain  Referring provider: Suni Diehl     HPI:   Talon Leblanc Jr. is a 47 y.o. male who presents today for evaluation of left ankle pain.  Rates pain as 1/10.  Pain has been ongoing for a few months.  Inciting event: injury;sprain.  Treatments tried: boot.    Inversion injury in April.  Seen in urgent care and given boot and instructed to follow-up.  Pain has resolved.    Works at [employer] and [employer].  Back at work without limitations/restrictions.     Does the patient use tobacco products? No  If so, what and how often? N/A    ROS:  Musculoskeletal: per HPI  Neurological: Negative for burning, tingling and numbness  Heme: Negative for blood thinners; Negative for history of blood clot  Endocrine: Negative for diabetes       Objective:   Exam:  There were no vitals filed for this visit.  General: No acute distress, well-appearing  Neurologic: Alert and oriented x3  Psychiatric: Appropriate mood and affect, cooperative  Cardiovascular: Regular pulse  Respiratory: Breathing on room air  Skin: No rashes or ulcers  Vascular: Palpable DP/PT  Musculoskeletal: Standing examination deferred.  There is no swelling.  There is no ecchymosis.    Focused exam of the left lower extremity demonstrates non-irriatble ankle range of motion.  Stability testing demonstrates good endpoints and symmetry to other side.  TTP about nothing.  NT about medial/lateral malleolus and ankle mortis/gutters.    TA and Achilles palpate in continuity.  Fires TA/GSC/PTT/peroneals without guarded range of motion.  Peroneals stable in their groove without associated TTP and without crepitus.  SILT SP/DP/PT and able to localize.     Imaging:  Radiographs were ordered and independently interpreted by me.    Updated standing ankle x-rays review no acute osseous abnormalities.  Joint maintained.    Additional records/labs reviewed:  Urgent care radiographs reviewed and show no osseous  abnormalities.      Assessment:     No diagnosis found.     Patient is seen for a new problem without complications expected from treatment.    Data: 1 results independently interpreted and 1 results reviewed    Treatment plan: Low risk of morbidity from treatment plan     I reviewed imaging, injury history, and diagnosis as above with the patient. I attempted to use layman's terms to educate the patient as well as utilize foot models and/or pictures.   I personally went through imaging with the patient and reviewed that radiographs are negative for fracture.  As such, I discussed the role for non-operative treatment.  Non-operative treatment for this injury involves: Anti-inflammatory medications or creams and RICE modalities.  I anticipate symptoms should improve with expectant treatment and there is <10% risk of long term symptoms from this injury.       Notably for ankle sprains, discussed the possibility of recurrent instability.  I reviewed that rehabilitation focuses on range of motion and edema control first followed by strengthening and balance work followed by slowly returning to activities.  In rare cases formal physical therapy is needed if difficulty progressing with home exercise program.  Advanced imaging modalities and possible surgical interventions are reserved for refractory symptoms lasting greater than 3 months.      Plan:       -activity as tolerated  -followup PRN    No orders of the defined types were placed in this encounter.      Past Medical History:   Diagnosis Date    Glaucoma     Hypertension     Multiple thyroid nodules     Prediabetes        Past Surgical History:   Procedure Laterality Date    CARPAL TUNNEL RELEASE Right 5/18/2022    Procedure: RELEASE, CARPAL TUNNEL,RIGHT;  Surgeon: Elicia Mckinney MD;  Location: [facility];  Service: Orthopedics;  Laterality: Right;    FRACTURE SURGERY      r femur fx w/ tatyana Right 2017       Family History   Problem Relation Age of Onset     Thyroid nodules Mother     No Known Problems Father     Thyroid cancer Neg Hx        Social History     Socioeconomic History    Marital status: Single   Tobacco Use    Smoking status: Never Smoker    Smokeless tobacco: Never Used   Substance and Sexual Activity    Alcohol use: No    Drug use: No    Sexual activity: Not Currently

## 2022-07-26 ENCOUNTER — LAB VISIT (OUTPATIENT)
Dept: LAB | Facility: HOSPITAL | Age: 47
End: 2022-07-26
Payer: COMMERCIAL

## 2022-07-26 DIAGNOSIS — R73.03 PREDIABETES: ICD-10-CM

## 2022-07-26 DIAGNOSIS — E78.5 HYPERLIPIDEMIA, UNSPECIFIED HYPERLIPIDEMIA TYPE: ICD-10-CM

## 2022-07-26 LAB
CHOLEST SERPL-MCNC: 170 MG/DL (ref 120–199)
CHOLEST/HDLC SERPL: 4.7 {RATIO} (ref 2–5)
ESTIMATED AVG GLUCOSE: 126 MG/DL (ref 68–131)
HBA1C MFR BLD: 6 % (ref 4–5.6)
HDLC SERPL-MCNC: 36 MG/DL (ref 40–75)
HDLC SERPL: 21.2 % (ref 20–50)
LDLC SERPL CALC-MCNC: 98.4 MG/DL (ref 63–159)
NONHDLC SERPL-MCNC: 134 MG/DL
TRIGL SERPL-MCNC: 178 MG/DL (ref 30–150)

## 2022-07-26 PROCEDURE — 80061 LIPID PANEL: CPT | Performed by: INTERNAL MEDICINE

## 2022-07-26 PROCEDURE — 83036 HEMOGLOBIN GLYCOSYLATED A1C: CPT | Performed by: INTERNAL MEDICINE

## 2022-07-26 PROCEDURE — 36415 COLL VENOUS BLD VENIPUNCTURE: CPT | Performed by: INTERNAL MEDICINE

## 2022-08-08 ENCOUNTER — ANESTHESIA EVENT (OUTPATIENT)
Dept: SURGERY | Facility: HOSPITAL | Age: 47
End: 2022-08-08
Payer: COMMERCIAL

## 2022-08-08 NOTE — ANESTHESIA PREPROCEDURE EVALUATION
08/08/2022  Talon Leblanc Jr. is a 47 y.o., male.    Chart review complete. Patient's medical history reviewed.  OK to proceed at [facility].    Jazzy Alvarez MD   8/8/2022          Pre-op Assessment    I have reviewed the Patient Summary Reports.     I have reviewed the Nursing Notes.    I have reviewed the Medications.     Review of Systems  Anesthesia Hx:  No problems with previous Anesthesia  Denies Family Hx of Anesthesia complications.   Denies Personal Hx of Anesthesia complications.   Social:  Non-Smoker    Hematology/Oncology:  Hematology Normal   Oncology Normal     EENT/Dental:EENT/Dental Normal   Cardiovascular:   Hypertension    Pulmonary:  Pulmonary Normal    Renal/:  Renal/ Normal     Hepatic/GI:  Hepatic/GI Normal    Musculoskeletal:  Musculoskeletal Normal    Neurological:  Neurology Normal Right CTS   Endocrine:  Endocrine Normal  Obesity / BMI > 30  Dermatological:  Skin Normal    Psych:  Psychiatric Normal           Physical Exam  General: Well nourished and Alert    Airway:  Mallampati: II   Mouth Opening: Normal  Tongue: Normal    Dental:  Intact        Anesthesia Plan  Type of Anesthesia, risks & benefits discussed:    Anesthesia Type: Gen Natural Airway, MAC  Intra-op Monitoring Plan: Standard ASA Monitors  Post Op Pain Control Plan: multimodal analgesia and IV/PO Opioids PRN  Induction:  IV  Informed Consent: Informed consent signed with the Patient and all parties understand the risks and agree with anesthesia plan.  All questions answered.   ASA Score: 2    Ready For Surgery From Anesthesia Perspective.     .

## 2022-08-16 ENCOUNTER — TELEPHONE (OUTPATIENT)
Dept: ORTHOPEDICS | Facility: CLINIC | Age: 47
End: 2022-08-16
Payer: COMMERCIAL

## 2022-08-16 NOTE — TELEPHONE ENCOUNTER
----- Message from Hali Cordero sent at 8/16/2022 10:57 AM CDT -----  Regarding: Procedure  Contact: KEON ROSAS JR. [[id number]]  Name of Who is Calling:KEON ROSAS JR. [[id number]]          What is the request in detail: Pt states he is requesting a call in regards to his upcoming procedure, he states he is requesting time, and wants you to used his work number. Please advise           Can the clinic reply by [facility]: No           What Number to Call Back if not in [facility]:[phone]

## 2022-08-16 NOTE — TELEPHONE ENCOUNTER
I have attempted without success to contact this patient by phone. Left VM for pt to contact clinic regarding questions

## 2022-08-18 ENCOUNTER — TELEPHONE (OUTPATIENT)
Dept: ORTHOPEDICS | Facility: CLINIC | Age: 47
End: 2022-08-18
Payer: COMMERCIAL

## 2022-08-18 ENCOUNTER — PATIENT MESSAGE (OUTPATIENT)
Dept: ORTHOPEDICS | Facility: CLINIC | Age: 47
End: 2022-08-18
Payer: COMMERCIAL

## 2022-08-18 DIAGNOSIS — Z98.890 POST-OPERATIVE STATE: Primary | ICD-10-CM

## 2022-08-18 RX ORDER — TRAMADOL HYDROCHLORIDE 50 MG/1
50 TABLET ORAL EVERY 6 HOURS PRN
Qty: 10 TABLET | Refills: 0 | Status: SHIPPED | OUTPATIENT
Start: 2022-08-18 | End: 2023-04-28

## 2022-08-18 NOTE — TELEPHONE ENCOUNTER
----- Message from Kenya Kam sent at 8/18/2022  2:14 PM CDT -----  Regarding: procedure  Name of Who is Calling:KEON ROSAS JR. [[id number]]          What is the request in detail: Patient is requesting a call back           Can the clinic reply by [facility]:  yes           [facility]: [phone]

## 2022-08-18 NOTE — TELEPHONE ENCOUNTER
Attempted to reach pt again. LVM requesting a confirmation via the [facility] site where previous message was sent informing pt of his 7:00 a.m. arrival time.

## 2022-08-18 NOTE — TELEPHONE ENCOUNTER
Spoke c pts Mother. His mother stated she would call him to inform him that we are trying to reach him. Provided clinic number for him to call us back.      Seeking to inform pt of 7:00 a.m. arrival time for 09/1922 surgery at the [facility]. Reminded pt of NPO status & PO appt.    My [facility] message sent

## 2022-08-18 NOTE — H&P
Subjective:      Patient ID: Talon Leblanc Jr. is a 47 y.o. male.    Chief Complaint: Left carpal tunnel syndrome     HPI  Talon Leblanc Jr. is a left hand dominant 47 y.o. male presenting today for follow up. He is 1 month status post right carpal tunnel release, doing well. He reports numbness and tingling has improved from pre operatively though does still have distal finger tip paresthesias. He notes some soreness at the incision and is working on scar massage.     He continues to have left hand symptoms. He reports numbness, tingling, pain, and weakness in the bilateral hands. Onset about 10 years ago. Symptoms are location in the median nerve distribution. Numbness is constant. He has night time symptoms, symptoms awaken him. He has tried bracing with minimal relief. He has not had injections. EMG with severe bilateral carpal tunnel. 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      Review of patient's allergies indicates:  No Known Allergies      No current facility-administered medications for this encounter.     Current Outpatient Medications   Medication Sig Dispense Refill    latanoprost 0.005 % ophthalmic solution Inject 2 drops into the eye Daily.      losartan (COZAAR) 25 MG tablet Take 1 tablet (25 mg total) by mouth once daily. 90 tablet 3    rosuvastatin (CRESTOR) 10 MG tablet Take 1 tablet (10 mg total) by mouth once daily. 90 tablet 3    traMADoL (ULTRAM) 50 mg tablet Take 1 tablet (50 mg total) by mouth every 6 (six) hours as needed for Pain. 10 tablet 0    traMADoL (ULTRAM) 50 mg tablet Take 1 tablet (50 mg total) by mouth every 6 (six) hours as needed for Pain. 10 tablet 0       Past Medical History:   Diagnosis Date    Glaucoma     Hypertension     Multiple thyroid nodules     Prediabetes        Past Surgical History:   Procedure Laterality Date    CARPAL TUNNEL RELEASE Right 5/18/2022    Procedure: RELEASE, CARPAL TUNNEL,RIGHT;  Surgeon: Elicia MORALES  MD Faye;  Location: [facility];  Service: Orthopedics;  Laterality: Right;    FRACTURE SURGERY      r femur fx w/ tatyana Right 2017       Review of Systems:  Constitutional: Negative for chills and fever.   Respiratory: Negative for cough and shortness of breath.    Gastrointestinal: Negative for nausea and vomiting.   Skin: Negative for rash.   Neurological: Negative for dizziness and headaches.   Psychiatric/Behavioral: Negative for depression.   MSK as in HPI       OBJECTIVE:     PHYSICAL EXAM:  There were no vitals taken for this visit.    GEN:  NAD, well-developed, well-groomed.  NEURO: Awake, alert, and oriented. Normal attention and concentration.    PSYCH: Normal mood and affect. Behavior is normal.  HEENT: No cervical lymphadenopathy noted.  CARDIOVASCULAR: Radial pulses 2+ bilaterally. No LE edema noted.  PULMONARY: Breath sounds normal. No respiratory distress.  SKIN: Intact, no rashes.      MSK:   RUE:  Good active ROM of the wrist and fingers. Well healed prior carpal tunnel incision. No signs of infection.  AIN/PIN/Radial/Median/Ulnar Nerves assessed in isolation without deficit. Radial & Ulnar arteries palpated 2+. Capillary Refill <3s.    LUE   Good active ROM of the wrist and fingers. Positive tinels carpal tunnel. No change in already present numbness with durkans. Negative tinels elbow. Thenar atrophy.  AIN/PIN/Radial/Median/Ulnar Nerves assessed in isolation without deficit. Radial & Ulnar arteries palpated 2+. Capillary Refill <3s.    RADIOGRAPHS:  Xray bl hands 4/29/22   Impression:   1. Mild stable degenerative changes of the left carpal bones.  Otherwise normal evaluation of the hands.     Comments: I have personally reviewed the imaging and I agree with the above radiologist's report.    EMG 4/1/22   Impression   This is an abnormal study. There is electrophysiologic evidence of:   1. Severe bilateral carpal tunnel syndrome (median neuropathy at the wrist) comparatively much worse on the  right, where there is secondary acute or ongoing denervation and chronic denervation in the APB muscle.         ASSESSMENT/PLAN:     Left carpal tunnel syndrome     Plan:   EMG reviewed with pt discussed severe left carpal tunnel. Treatment options discussed. Pt wishes to proceed with a left carpal tunnel release. Consents reviewed and signed in clinic. Discussed weakness and numbness may not improve due to significant nerve change pt understand. RTC PO       - To OR for left carpal tunnel release

## 2022-08-19 ENCOUNTER — ANESTHESIA (OUTPATIENT)
Dept: SURGERY | Facility: HOSPITAL | Age: 47
End: 2022-08-19
Payer: COMMERCIAL

## 2022-08-19 ENCOUNTER — HOSPITAL ENCOUNTER (OUTPATIENT)
Facility: HOSPITAL | Age: 47
Discharge: HOME | End: 2022-08-19
Attending: ORTHOPAEDIC SURGERY | Admitting: ORTHOPAEDIC SURGERY
Payer: COMMERCIAL

## 2022-08-19 VITALS
SYSTOLIC BLOOD PRESSURE: 123 MMHG | WEIGHT: 215 LBS | HEART RATE: 65 BPM | BODY MASS INDEX: 33.74 KG/M2 | DIASTOLIC BLOOD PRESSURE: 76 MMHG | HEIGHT: 67 IN | TEMPERATURE: 98 F | RESPIRATION RATE: 15 BRPM | OXYGEN SATURATION: 100 %

## 2022-08-19 DIAGNOSIS — G56.00 CARPAL TUNNEL SYNDROME: ICD-10-CM

## 2022-08-19 PROCEDURE — 37000009 HC ANESTHESIA EA ADD 15 MINS: Performed by: ORTHOPAEDIC SURGERY

## 2022-08-19 PROCEDURE — 36000706: Performed by: ORTHOPAEDIC SURGERY

## 2022-08-19 PROCEDURE — 64721 PR REVISE MEDIAN N/CARPAL TUNNEL SURG: ICD-10-PCS | Mod: LT,,, | Performed by: ORTHOPAEDIC SURGERY

## 2022-08-19 PROCEDURE — D9220A PRA ANESTHESIA: Mod: CRNA,,, | Performed by: NURSE ANESTHETIST, CERTIFIED REGISTERED

## 2022-08-19 PROCEDURE — D9220A PRA ANESTHESIA: Mod: ANES,,, | Performed by: ANESTHESIOLOGY

## 2022-08-19 PROCEDURE — 37000008 HC ANESTHESIA 1ST 15 MINUTES: Performed by: ORTHOPAEDIC SURGERY

## 2022-08-19 PROCEDURE — 25000003 PHARM REV CODE 250: Performed by: SURGERY

## 2022-08-19 PROCEDURE — 25000003 PHARM REV CODE 250: Performed by: NURSE ANESTHETIST, CERTIFIED REGISTERED

## 2022-08-19 PROCEDURE — 63600175 PHARM REV CODE 636 W HCPCS: Performed by: STUDENT IN AN ORGANIZED HEALTH CARE EDUCATION/TRAINING PROGRAM

## 2022-08-19 PROCEDURE — 64721 CARPAL TUNNEL SURGERY: CPT | Mod: LT,,, | Performed by: ORTHOPAEDIC SURGERY

## 2022-08-19 PROCEDURE — 71000015 HC POSTOP RECOV 1ST HR: Performed by: ORTHOPAEDIC SURGERY

## 2022-08-19 PROCEDURE — 71000033 HC RECOVERY, INTIAL HOUR: Performed by: ORTHOPAEDIC SURGERY

## 2022-08-19 PROCEDURE — D9220A PRA ANESTHESIA: ICD-10-PCS | Mod: CRNA,,, | Performed by: NURSE ANESTHETIST, CERTIFIED REGISTERED

## 2022-08-19 PROCEDURE — 25000003 PHARM REV CODE 250: Performed by: ORTHOPAEDIC SURGERY

## 2022-08-19 PROCEDURE — 36000707: Performed by: ORTHOPAEDIC SURGERY

## 2022-08-19 PROCEDURE — D9220A PRA ANESTHESIA: ICD-10-PCS | Mod: ANES,,, | Performed by: ANESTHESIOLOGY

## 2022-08-19 PROCEDURE — 25000003 PHARM REV CODE 250: Performed by: STUDENT IN AN ORGANIZED HEALTH CARE EDUCATION/TRAINING PROGRAM

## 2022-08-19 PROCEDURE — 63600175 PHARM REV CODE 636 W HCPCS: Performed by: NURSE ANESTHETIST, CERTIFIED REGISTERED

## 2022-08-19 PROCEDURE — 27201423 OPTIME MED/SURG SUP & DEVICES STERILE SUPPLY: Performed by: ORTHOPAEDIC SURGERY

## 2022-08-19 PROCEDURE — 94761 N-INVAS EAR/PLS OXIMETRY MLT: CPT

## 2022-08-19 PROCEDURE — 99900035 HC TECH TIME PER 15 MIN (STAT)

## 2022-08-19 RX ORDER — SODIUM CHLORIDE 9 MG/ML
INJECTION, SOLUTION INTRAVENOUS CONTINUOUS
Status: ACTIVE | OUTPATIENT
Start: 2022-08-19

## 2022-08-19 RX ORDER — BUPIVACAINE HYDROCHLORIDE 2.5 MG/ML
INJECTION, SOLUTION EPIDURAL; INFILTRATION; INTRACAUDAL
Status: DISCONTINUED | OUTPATIENT
Start: 2022-08-19 | End: 2022-08-19 | Stop reason: HOSPADM

## 2022-08-19 RX ORDER — ACETAMINOPHEN 500 MG
1000 TABLET ORAL
Status: COMPLETED | OUTPATIENT
Start: 2022-08-19 | End: 2022-08-19

## 2022-08-19 RX ORDER — CEFAZOLIN SODIUM 1 G/3ML
2 INJECTION, POWDER, FOR SOLUTION INTRAMUSCULAR; INTRAVENOUS
Status: COMPLETED | OUTPATIENT
Start: 2022-08-19 | End: 2022-08-19

## 2022-08-19 RX ORDER — ONDANSETRON 2 MG/ML
INJECTION INTRAMUSCULAR; INTRAVENOUS
Status: DISCONTINUED | OUTPATIENT
Start: 2022-08-19 | End: 2022-08-19

## 2022-08-19 RX ORDER — LIDOCAINE HYDROCHLORIDE 10 MG/ML
INJECTION, SOLUTION EPIDURAL; INFILTRATION; INTRACAUDAL; PERINEURAL
Status: DISCONTINUED | OUTPATIENT
Start: 2022-08-19 | End: 2022-08-19 | Stop reason: HOSPADM

## 2022-08-19 RX ORDER — MIDAZOLAM HYDROCHLORIDE 1 MG/ML
INJECTION INTRAMUSCULAR; INTRAVENOUS
Status: DISCONTINUED | OUTPATIENT
Start: 2022-08-19 | End: 2022-08-19

## 2022-08-19 RX ORDER — LIDOCAINE HCL/PF 100 MG/5ML
SYRINGE (ML) INTRAVENOUS
Status: DISCONTINUED | OUTPATIENT
Start: 2022-08-19 | End: 2022-08-19

## 2022-08-19 RX ORDER — SODIUM CHLORIDE 9 MG/ML
INJECTION, SOLUTION INTRAVENOUS CONTINUOUS PRN
Status: DISCONTINUED | OUTPATIENT
Start: 2022-08-19 | End: 2022-08-19

## 2022-08-19 RX ORDER — FENTANYL CITRATE 50 UG/ML
25 INJECTION, SOLUTION INTRAMUSCULAR; INTRAVENOUS EVERY 5 MIN PRN
Status: DISCONTINUED | OUTPATIENT
Start: 2022-08-19 | End: 2022-08-19 | Stop reason: HOSPADM

## 2022-08-19 RX ORDER — DEXMEDETOMIDINE HYDROCHLORIDE 100 UG/ML
INJECTION, SOLUTION INTRAVENOUS
Status: DISCONTINUED | OUTPATIENT
Start: 2022-08-19 | End: 2022-08-19

## 2022-08-19 RX ORDER — OXYCODONE HYDROCHLORIDE 5 MG/1
5 TABLET ORAL
Status: DISCONTINUED | OUTPATIENT
Start: 2022-08-19 | End: 2022-08-19 | Stop reason: HOSPADM

## 2022-08-19 RX ORDER — SODIUM CHLORIDE 0.9 % (FLUSH) 0.9 %
10 SYRINGE (ML) INJECTION
Status: DISCONTINUED | OUTPATIENT
Start: 2022-08-19 | End: 2022-08-19 | Stop reason: HOSPADM

## 2022-08-19 RX ORDER — PROPOFOL 10 MG/ML
INJECTION, EMULSION INTRAVENOUS
Status: DISCONTINUED | OUTPATIENT
Start: 2022-08-19 | End: 2022-08-19

## 2022-08-19 RX ORDER — PROPOFOL 10 MG/ML
INJECTION, EMULSION INTRAVENOUS CONTINUOUS PRN
Status: DISCONTINUED | OUTPATIENT
Start: 2022-08-19 | End: 2022-08-19

## 2022-08-19 RX ORDER — HALOPERIDOL 5 MG/ML
0.5 INJECTION INTRAMUSCULAR EVERY 10 MIN PRN
Status: DISCONTINUED | OUTPATIENT
Start: 2022-08-19 | End: 2022-08-19 | Stop reason: HOSPADM

## 2022-08-19 RX ORDER — FAMOTIDINE 10 MG/ML
INJECTION INTRAVENOUS
Status: DISCONTINUED | OUTPATIENT
Start: 2022-08-19 | End: 2022-08-19

## 2022-08-19 RX ORDER — LIDOCAINE HYDROCHLORIDE 10 MG/ML
1 INJECTION, SOLUTION EPIDURAL; INFILTRATION; INTRACAUDAL; PERINEURAL ONCE
Status: ACTIVE | OUTPATIENT
Start: 2022-08-19

## 2022-08-19 RX ORDER — DEXAMETHASONE SODIUM PHOSPHATE 4 MG/ML
INJECTION, SOLUTION INTRA-ARTICULAR; INTRALESIONAL; INTRAMUSCULAR; INTRAVENOUS; SOFT TISSUE
Status: DISCONTINUED | OUTPATIENT
Start: 2022-08-19 | End: 2022-08-19

## 2022-08-19 RX ORDER — MUPIROCIN 20 MG/G
OINTMENT TOPICAL
Status: DISPENSED | OUTPATIENT
Start: 2022-08-19

## 2022-08-19 RX ORDER — BACITRACIN ZINC 500 UNIT/G
OINTMENT (GRAM) TOPICAL
Status: DISCONTINUED | OUTPATIENT
Start: 2022-08-19 | End: 2022-08-19 | Stop reason: HOSPADM

## 2022-08-19 RX ADMIN — SODIUM CHLORIDE: 0.9 INJECTION, SOLUTION INTRAVENOUS at 08:08

## 2022-08-19 RX ADMIN — FAMOTIDINE 20 MG: 10 INJECTION, SOLUTION INTRAVENOUS at 09:08

## 2022-08-19 RX ADMIN — ACETAMINOPHEN 1000 MG: 500 TABLET ORAL at 08:08

## 2022-08-19 RX ADMIN — DEXAMETHASONE SODIUM PHOSPHATE 8 MG: 4 INJECTION, SOLUTION INTRAMUSCULAR; INTRAVENOUS at 09:08

## 2022-08-19 RX ADMIN — DEXMEDETOMIDINE HYDROCHLORIDE 10 MCG: 100 INJECTION, SOLUTION, CONCENTRATE INTRAVENOUS at 09:08

## 2022-08-19 RX ADMIN — MIDAZOLAM HYDROCHLORIDE 2 MG: 1 INJECTION, SOLUTION INTRAMUSCULAR; INTRAVENOUS at 09:08

## 2022-08-19 RX ADMIN — PROPOFOL 30 MG: 10 INJECTION, EMULSION INTRAVENOUS at 09:08

## 2022-08-19 RX ADMIN — ONDANSETRON 4 MG: 2 INJECTION INTRAMUSCULAR; INTRAVENOUS at 09:08

## 2022-08-19 RX ADMIN — PROPOFOL 40 MG: 10 INJECTION, EMULSION INTRAVENOUS at 09:08

## 2022-08-19 RX ADMIN — MUPIROCIN: 20 OINTMENT TOPICAL at 08:08

## 2022-08-19 RX ADMIN — LIDOCAINE HYDROCHLORIDE 40 MG: 20 INJECTION, SOLUTION INTRAVENOUS at 09:08

## 2022-08-19 RX ADMIN — CEFAZOLIN 2 G: 330 INJECTION, POWDER, FOR SOLUTION INTRAMUSCULAR; INTRAVENOUS at 09:08

## 2022-08-19 RX ADMIN — PROPOFOL 100 MCG/KG/MIN: 10 INJECTION, EMULSION INTRAVENOUS at 09:08

## 2022-08-19 NOTE — PLAN OF CARE
Patient states he gave pre op nurse leave of absence papers. Papers given to Dr. Mckinney's staff and instructed patient to  papers from office when completed. Number to Dr. Riley's office provided.

## 2022-08-19 NOTE — PLAN OF CARE
Dr. Mckinney at bedside. Iv dc'd with cath tip intact. Patient helped to wheelchair and discharged.

## 2022-08-19 NOTE — PLAN OF CARE
VSS. Patient able to tolerate oral liquids. Patient denies c/o pain. Patient/family received home medication per bedside delivery. Dressing intact. No distress noted. Discharge instructions reviewed with patient and family, verbalized understanding. Patient waiting to see Dr. Mckinney post op. Will continue to monitor.

## 2022-08-19 NOTE — PLAN OF CARE
Need MD signature on consent, site marked, updated h&p and anesthesia consent.    Patient has paperwork for MD to fill out.

## 2022-08-19 NOTE — TRANSFER OF CARE
"Anesthesia Transfer of Care Note    Patient: Talon Leblanc Jr.    Procedure(s) Performed: Procedure(s) (LRB):  RELEASE, CARPAL TUNNEL,LEFT (Left)    Patient location: PACU    Anesthesia Type: general    Transport from OR: Transported from OR on 6-10 L/min O2 by face mask with adequate spontaneous ventilation    Post pain: adequate analgesia    Post assessment: no apparent anesthetic complications    Post vital signs: stable    Level of consciousness: sedated    Nausea/Vomiting: no nausea/vomiting    Complications: none    Transfer of care protocol was followed      Last vitals:   Visit Vitals  /67 (BP Location: Right arm, Patient Position: Lying)   Pulse 68   Temp 36.8 °C (98.2 °F) (Oral)   Resp 16   Ht 5' 7" (1.702 m)   Wt 97.5 kg (215 lb)   SpO2 96%   BMI 33.67 kg/m²     "

## 2022-08-19 NOTE — BRIEF OP NOTE
[place] - Surgery ([facility])  Brief Operative Note    Surgery Date: 8/19/2022     Surgeon(s) and Role:     * Elicia Mckinney MD - Primary    Assisting Surgeon: None    Pre-op Diagnosis:  Left carpal tunnel syndrome [G56.02]    Post-op Diagnosis:  Post-Op Diagnosis Codes:     * Left carpal tunnel syndrome [G56.02]    Procedure(s) (LRB):  RELEASE, CARPAL TUNNEL,LEFT (Left)    Anesthesia: Local MAC    Operative Findings: See op note    Estimated Blood Loss: * No values recorded between 8/19/2022  9:56 AM and 8/19/2022 10:26 AM *         Specimens:   Specimen (24h ago, onward)            None            Discharge Note    OUTCOME: Patient tolerated treatment/procedure well without complication and is now ready for discharge.    DISPOSITION: Home or Self Care    FINAL DIAGNOSIS:    Problem List Items Addressed This Visit    None     Visit Diagnoses     Carpal tunnel syndrome        Relevant Orders    Diet general    Call MD for:  temperature >100.4    Call MD for:  persistent nausea and vomiting    Call MD for:  severe uncontrolled pain    Call MD for:  difficulty breathing, headache or visual disturbances    Call MD for:  redness, tenderness, or signs of infection (pain, swelling, redness, odor or green/yellow discharge around incision site)    Call MD for:  hives    Call MD for:  persistent dizziness or light-headedness    Call MD for:  extreme fatigue    No driving, operating heavy equipment or signing legal documents while taking pain medication    Weight bearing restrictions (specify)    Leave dressing on - Keep it clean, dry, and intact until clinic visit            FOLLOWUP: In clinic    DISCHARGE INSTRUCTIONS:    Discharge Procedure Orders   Diet general     Call MD for:  temperature >100.4     Call MD for:  persistent nausea and vomiting     Call MD for:  severe uncontrolled pain     Call MD for:  difficulty breathing, headache or visual disturbances     Call MD for:  redness, tenderness, or signs of  infection (pain, swelling, redness, odor or green/yellow discharge around incision site)     Call MD for:  hives     Call MD for:  persistent dizziness or light-headedness     Call MD for:  extreme fatigue     No driving, operating heavy equipment or signing legal documents while taking pain medication     Leave dressing on - Keep it clean, dry, and intact until clinic visit     Weight bearing restrictions (specify)   Order Comments: ROM as tolerated; no heavy lifting

## 2022-08-19 NOTE — OP NOTE
[facility] ([facility])  Surgery Department  Operative Note    SUMMARY     Date of Procedure: 8/19/2022     Procedure: Procedure(s) (LRB):  RELEASE, CARPAL TUNNEL,LEFT (Left)     Surgeon(s) and Role:     * Elicia Mckinney MD - Primary    Assisting Surgeon: Zenobia Tirado MD    Pre-Operative Diagnosis: Left carpal tunnel syndrome [G56.02]    Post-Operative Diagnosis: Post-Op Diagnosis Codes:     * Left carpal tunnel syndrome [G56.02]    Anesthesia: Local MAC    Technical Procedures Used: retraction    Description of the Findings of the Procedure: COMPLICATIONS: None.   INDICATIONS FOR PROCEDURE: Mr Leblanc is a 47-year-old male who had numbness   and tingling into her left hand.  He has failed conservative treatment, EMG   was positive for carpal tunnel syndrome. Therefore, operative intervention was   deemed necessary. Risks and benefits were explained to the patient in clinic   since performed in clinic.   PROCEDURE IN DETAIL: After correct site was marked with the patient's   participation in the holding area, the patient was brought to the Operating   Room, placed in supine position, underwent MAC anesthesia. A well-padded   nonsterile tourniquet was placed on the left arm. A time-out was called for   correct site, procedure and patient to be indicated. Under sterile conditions,   an injection of lidocaine 1% was injected into the carpal tunnel space. The arm   was prepped and draped in normal sterile fashion. The incision was marked out   using Horne cardinal lines. The arm was exsanguinated using Esmarch.   Tourniquet was insufflated to 250 mmHg and that is where it remained for a total   of 15 minutes. The incision was made down to the palmar fascia. The palmar   fascia was sharply incised. The transverse ligament was identified. It was   very thick in nature. It was sharply incised. Median nerve was identified. A   Mosquito hemostat was passed from the undersurface of the transverse ligament    proximally and distally to free it from the median nerve. Metzenbaum scissors   were utilized to cut the transverse ligament proximally and distally. Once that   was completely released, a Mosquito hemostat was passed again to confirm a   complete release. Once that was performed, the area was irrigated with copious   amounts of normal saline. Nylon closed the skin. Sterile dressing was applied.   Tourniquet was deflated. Brisk capillary refill ensued. The patient was   transported to the Recovery Room in stable condition.   POSTOPERATIVE PLAN FOR THIS PATIENT: She is to keep her dressing clean, dry and   intact. We will see her back in 2 weeks for stitch removal.   OF NOTE: Pt had significant compression almost with the nerve in its own fibroosseous tunnel.This required an extensile incision.    Significant Surgical Tasks Conducted by the Assistant(s), if Applicable: retraction    Complications: No    Estimated Blood Loss (EBL): * No values recorded between 8/19/2022  9:56 AM and 8/19/2022 10:26 AM *           Implants: * No implants in log *    Specimens:   Specimen (24h ago, onward)                None                    Condition: Good    Disposition: PACU - hemodynamically stable.    Attestation: I was present and scrubbed for the entire procedure.    Discharge Note    SUMMARY     Admit Date: 8/19/2022    Discharge Date and Time: 8/19/2022 12:13 PM    Hospital Course (synopsis of major diagnoses, care, treatment, and services provided during the course of the hospital stay): surgery     Final Diagnosis: Post-Op Diagnosis Codes:     * Left carpal tunnel syndrome [G56.02]    Disposition: Home or Self Care    Follow Up/Patient Instructions:     Medications:  Reconciled Home Medications:      Medication List        CONTINUE taking these medications      latanoprost 0.005 % ophthalmic solution  Inject 2 drops into the eye Daily.     losartan 25 MG tablet  Commonly known as: COZAAR  Take 1 tablet (25 mg total) by  mouth once daily.     rosuvastatin 10 MG tablet  Commonly known as: CRESTOR  Take 1 tablet (10 mg total) by mouth once daily.     * traMADoL 50 mg tablet  Commonly known as: ULTRAM  Take 1 tablet (50 mg total) by mouth every 6 (six) hours as needed for Pain.     * traMADoL 50 mg tablet  Commonly known as: ULTRAM  Take 1 tablet (50 mg total) by mouth every 6 (six) hours as needed for Pain.           * This list has 2 medication(s) that are the same as other medications prescribed for you. Read the directions carefully, and ask your doctor or other care provider to review them with you.                Discharge Procedure Orders   Diet general     Call MD for:  temperature >100.4     Call MD for:  persistent nausea and vomiting     Call MD for:  severe uncontrolled pain     Call MD for:  difficulty breathing, headache or visual disturbances     Call MD for:  redness, tenderness, or signs of infection (pain, swelling, redness, odor or green/yellow discharge around incision site)     Call MD for:  hives     Call MD for:  persistent dizziness or light-headedness     Call MD for:  extreme fatigue     No driving, operating heavy equipment or signing legal documents while taking pain medication     Leave dressing on - Keep it clean, dry, and intact until clinic visit     Weight bearing restrictions (specify)   Order Comments: ROM as tolerated; no heavy lifting

## 2022-08-22 NOTE — ANESTHESIA POSTPROCEDURE EVALUATION
Anesthesia Post Evaluation    Patient: Talon Leblanc Jr.    Procedure(s) Performed: Procedure(s) (LRB):  RELEASE, CARPAL TUNNEL,LEFT (Left)    Final Anesthesia Type: general      Patient location during evaluation: PACU  Patient participation: Yes- Able to Participate  Level of consciousness: awake and alert  Post-procedure vital signs: reviewed and stable  Pain management: adequate  Airway patency: patent    PONV status at discharge: No PONV  Anesthetic complications: no      Cardiovascular status: blood pressure returned to baseline  Respiratory status: unassisted  Hydration status: euvolemic  Follow-up not needed.          Vitals Value Taken Time   /76 08/19/22 1132   Temp 36.6 °C (97.9 °F) 08/19/22 1145   Pulse 66 08/19/22 1147   Resp 20 08/19/22 1147   SpO2 100 % 08/19/22 1146   Vitals shown include unvalidated device data.      Event Time   Out of Recovery 11:02:00         Pain/Yessica Score: No data recorded

## 2022-08-31 ENCOUNTER — TELEPHONE (OUTPATIENT)
Dept: ORTHOPEDICS | Facility: CLINIC | Age: 47
End: 2022-08-31
Payer: COMMERCIAL

## 2022-09-02 ENCOUNTER — OFFICE VISIT (OUTPATIENT)
Dept: ORTHOPEDICS | Facility: CLINIC | Age: 47
End: 2022-09-02
Payer: COMMERCIAL

## 2022-09-02 VITALS
DIASTOLIC BLOOD PRESSURE: 88 MMHG | SYSTOLIC BLOOD PRESSURE: 152 MMHG | WEIGHT: 215 LBS | BODY MASS INDEX: 33.74 KG/M2 | HEART RATE: 64 BPM | HEIGHT: 67 IN

## 2022-09-02 DIAGNOSIS — Z98.890 POST-OPERATIVE STATE: Primary | ICD-10-CM

## 2022-09-02 PROCEDURE — 3008F PR BODY MASS INDEX (BMI) DOCUMENTED: ICD-10-PCS | Mod: CPTII,S$GLB,, | Performed by: PHYSICIAN ASSISTANT

## 2022-09-02 PROCEDURE — 3079F DIAST BP 80-89 MM HG: CPT | Mod: CPTII,S$GLB,, | Performed by: PHYSICIAN ASSISTANT

## 2022-09-02 PROCEDURE — 3008F BODY MASS INDEX DOCD: CPT | Mod: CPTII,S$GLB,, | Performed by: PHYSICIAN ASSISTANT

## 2022-09-02 PROCEDURE — 1159F PR MEDICATION LIST DOCUMENTED IN MEDICAL RECORD: ICD-10-PCS | Mod: CPTII,S$GLB,, | Performed by: PHYSICIAN ASSISTANT

## 2022-09-02 PROCEDURE — 99024 PR POST-OP FOLLOW-UP VISIT: ICD-10-PCS | Mod: S$GLB,,, | Performed by: PHYSICIAN ASSISTANT

## 2022-09-02 PROCEDURE — 3077F SYST BP >= 140 MM HG: CPT | Mod: CPTII,S$GLB,, | Performed by: PHYSICIAN ASSISTANT

## 2022-09-02 PROCEDURE — 4010F PR ACE/ARB THEARPY RXD/TAKEN: ICD-10-PCS | Mod: CPTII,S$GLB,, | Performed by: PHYSICIAN ASSISTANT

## 2022-09-02 PROCEDURE — 99999 PR PBB SHADOW E&M-EST. PATIENT-LVL III: ICD-10-PCS | Mod: PBBFAC,,, | Performed by: PHYSICIAN ASSISTANT

## 2022-09-02 PROCEDURE — 4010F ACE/ARB THERAPY RXD/TAKEN: CPT | Mod: CPTII,S$GLB,, | Performed by: PHYSICIAN ASSISTANT

## 2022-09-02 PROCEDURE — 3079F PR MOST RECENT DIASTOLIC BLOOD PRESSURE 80-89 MM HG: ICD-10-PCS | Mod: CPTII,S$GLB,, | Performed by: PHYSICIAN ASSISTANT

## 2022-09-02 PROCEDURE — 3077F PR MOST RECENT SYSTOLIC BLOOD PRESSURE >= 140 MM HG: ICD-10-PCS | Mod: CPTII,S$GLB,, | Performed by: PHYSICIAN ASSISTANT

## 2022-09-02 PROCEDURE — 3044F PR MOST RECENT HEMOGLOBIN A1C LEVEL <7.0%: ICD-10-PCS | Mod: CPTII,S$GLB,, | Performed by: PHYSICIAN ASSISTANT

## 2022-09-02 PROCEDURE — 1159F MED LIST DOCD IN RCRD: CPT | Mod: CPTII,S$GLB,, | Performed by: PHYSICIAN ASSISTANT

## 2022-09-02 PROCEDURE — 3044F HG A1C LEVEL LT 7.0%: CPT | Mod: CPTII,S$GLB,, | Performed by: PHYSICIAN ASSISTANT

## 2022-09-02 PROCEDURE — 99999 PR PBB SHADOW E&M-EST. PATIENT-LVL III: CPT | Mod: PBBFAC,,, | Performed by: PHYSICIAN ASSISTANT

## 2022-09-02 PROCEDURE — 99024 POSTOP FOLLOW-UP VISIT: CPT | Mod: S$GLB,,, | Performed by: PHYSICIAN ASSISTANT

## 2022-09-02 NOTE — PROGRESS NOTES
"Mr. Leblanc is here today for a post-operative visit.  He is 14 days status post left carpal tunnel release by Dr. Mckinney on 8/19/22. He reports that he doing well. He removed his post op dressing around 4 days post op reports he has been keeping incision covered with gauze. He does still have some tingling. Pain is minimal. He denies fever, chills, and sweats since the time of the surgery.     OF NOTE: Pt had significant compression almost with the nerve in its own fibroosseous tunnel.This required an extensile incision.    Physical exam:    Vitals:    09/02/22 0853   BP: (!) 152/88   Pulse: 64   Weight: 97.5 kg (215 lb)   Height: 5' 7" (1.702 m)   PainSc: 0-No pain     Vital signs are stable, patient is afebrile.  Patient is well dressed and well groomed, no acute distress.  Alert and oriented to person, place, and time.  Post op dressing taken down.  Incision is clean, dry and intact.  There is no erythema or exudate.  There is no sign of any infection. He is NVI. Sutures removed without difficulty. Good ROM.    Assessment: status post left carpal tunnel release by Dr. Mckinney on 8/19/22    Plan:  Talon was seen today for post-op evaluation and numbness.    Diagnoses and all orders for this visit:    Post-operative state    PO instruction reviewed and provided to patient  Discussed significant compression and nerve healing   RTC if needed       "

## 2022-09-08 ENCOUNTER — TELEPHONE (OUTPATIENT)
Dept: ORTHOPEDICS | Facility: CLINIC | Age: 47
End: 2022-09-08
Payer: COMMERCIAL

## 2022-09-08 NOTE — TELEPHONE ENCOUNTER
Spoke with pt informed him that his [place] paperwork is ready for .     ----- Message from Lc Tidwell sent at 9/8/2022  8:40 AM CDT -----  Name of Who is Calling: KEON ROSAS JR. [[id number]]           What is the request in detail: Patient is requesting a call back from [facility] to discuss disability forms.  Please assist.           Can the clinic reply by [facility]: NO           What Number to Call Back if not in [facility]: [phone] or [phone]

## 2022-11-09 ENCOUNTER — OFFICE VISIT (OUTPATIENT)
Dept: URGENT CARE | Facility: CLINIC | Age: 47
End: 2022-11-09
Payer: COMMERCIAL

## 2022-11-09 VITALS
BODY MASS INDEX: 33.74 KG/M2 | WEIGHT: 215 LBS | RESPIRATION RATE: 16 BRPM | HEART RATE: 85 BPM | OXYGEN SATURATION: 99 % | HEIGHT: 67 IN | DIASTOLIC BLOOD PRESSURE: 89 MMHG | TEMPERATURE: 98 F | SYSTOLIC BLOOD PRESSURE: 142 MMHG

## 2022-11-09 DIAGNOSIS — J06.9 UPPER RESPIRATORY TRACT INFECTION, UNSPECIFIED TYPE: ICD-10-CM

## 2022-11-09 DIAGNOSIS — U07.1 COVID-19 VIRUS DETECTED: ICD-10-CM

## 2022-11-09 DIAGNOSIS — U07.1 COVID-19: Primary | ICD-10-CM

## 2022-11-09 DIAGNOSIS — R05.9 COUGH, UNSPECIFIED TYPE: ICD-10-CM

## 2022-11-09 LAB
CTP QC/QA: YES
CTP QC/QA: YES
POC MOLECULAR INFLUENZA A AGN: NEGATIVE
POC MOLECULAR INFLUENZA B AGN: NEGATIVE
SARS-COV-2 AG RESP QL IA.RAPID: POSITIVE

## 2022-11-09 PROCEDURE — 87502 INFLUENZA DNA AMP PROBE: CPT | Mod: QW,S$GLB,, | Performed by: EMERGENCY MEDICINE

## 2022-11-09 PROCEDURE — 1159F PR MEDICATION LIST DOCUMENTED IN MEDICAL RECORD: ICD-10-PCS | Mod: CPTII,S$GLB,, | Performed by: EMERGENCY MEDICINE

## 2022-11-09 PROCEDURE — 87811 SARS CORONAVIRUS 2 ANTIGEN POCT, MANUAL READ: ICD-10-PCS | Mod: QW,S$GLB,, | Performed by: EMERGENCY MEDICINE

## 2022-11-09 PROCEDURE — 3044F HG A1C LEVEL LT 7.0%: CPT | Mod: CPTII,S$GLB,, | Performed by: EMERGENCY MEDICINE

## 2022-11-09 PROCEDURE — 3044F PR MOST RECENT HEMOGLOBIN A1C LEVEL <7.0%: ICD-10-PCS | Mod: CPTII,S$GLB,, | Performed by: EMERGENCY MEDICINE

## 2022-11-09 PROCEDURE — 87811 SARS-COV-2 COVID19 W/OPTIC: CPT | Mod: QW,S$GLB,, | Performed by: EMERGENCY MEDICINE

## 2022-11-09 PROCEDURE — 3079F DIAST BP 80-89 MM HG: CPT | Mod: CPTII,S$GLB,, | Performed by: EMERGENCY MEDICINE

## 2022-11-09 PROCEDURE — 87502 POCT INFLUENZA A/B MOLECULAR: ICD-10-PCS | Mod: QW,S$GLB,, | Performed by: EMERGENCY MEDICINE

## 2022-11-09 PROCEDURE — 4010F ACE/ARB THERAPY RXD/TAKEN: CPT | Mod: CPTII,S$GLB,, | Performed by: EMERGENCY MEDICINE

## 2022-11-09 PROCEDURE — 3079F PR MOST RECENT DIASTOLIC BLOOD PRESSURE 80-89 MM HG: ICD-10-PCS | Mod: CPTII,S$GLB,, | Performed by: EMERGENCY MEDICINE

## 2022-11-09 PROCEDURE — 4010F PR ACE/ARB THEARPY RXD/TAKEN: ICD-10-PCS | Mod: CPTII,S$GLB,, | Performed by: EMERGENCY MEDICINE

## 2022-11-09 PROCEDURE — 3008F PR BODY MASS INDEX (BMI) DOCUMENTED: ICD-10-PCS | Mod: CPTII,S$GLB,, | Performed by: EMERGENCY MEDICINE

## 2022-11-09 PROCEDURE — 1160F PR REVIEW ALL MEDS BY PRESCRIBER/CLIN PHARMACIST DOCUMENTED: ICD-10-PCS | Mod: CPTII,S$GLB,, | Performed by: EMERGENCY MEDICINE

## 2022-11-09 PROCEDURE — 1159F MED LIST DOCD IN RCRD: CPT | Mod: CPTII,S$GLB,, | Performed by: EMERGENCY MEDICINE

## 2022-11-09 PROCEDURE — 3008F BODY MASS INDEX DOCD: CPT | Mod: CPTII,S$GLB,, | Performed by: EMERGENCY MEDICINE

## 2022-11-09 PROCEDURE — 3077F PR MOST RECENT SYSTOLIC BLOOD PRESSURE >= 140 MM HG: ICD-10-PCS | Mod: CPTII,S$GLB,, | Performed by: EMERGENCY MEDICINE

## 2022-11-09 PROCEDURE — 99214 PR OFFICE/OUTPT VISIT, EST, LEVL IV, 30-39 MIN: ICD-10-PCS | Mod: S$GLB,,, | Performed by: EMERGENCY MEDICINE

## 2022-11-09 PROCEDURE — 1160F RVW MEDS BY RX/DR IN RCRD: CPT | Mod: CPTII,S$GLB,, | Performed by: EMERGENCY MEDICINE

## 2022-11-09 PROCEDURE — 99214 OFFICE O/P EST MOD 30 MIN: CPT | Mod: S$GLB,,, | Performed by: EMERGENCY MEDICINE

## 2022-11-09 PROCEDURE — 3077F SYST BP >= 140 MM HG: CPT | Mod: CPTII,S$GLB,, | Performed by: EMERGENCY MEDICINE

## 2022-11-09 RX ORDER — NIRMATRELVIR AND RITONAVIR 300-100 MG
KIT ORAL
Qty: 30 TABLET | Refills: 0 | Status: SHIPPED | OUTPATIENT
Start: 2022-11-09 | End: 2023-04-28

## 2022-11-09 NOTE — LETTER
November 9, 2022      Urgent Care - [address]  [facility] [address]  Phone: [phone]  Fax: [phone]       Patient: Talon Leblanc   YOB: 1975  Date of Visit: 11/09/2022    To Whom It May Concern:    Brionna Leblanc  was at [facility] on 11/09/2022. The patient may return to work/school on 11/15/22 with restrictions.     Patient must wear a mask when returning to work from November 15 through November 21st    If you have any questions or concerns, or if I can be of further assistance, please do not hesitate to contact me.    Sincerely,      Dawson Jenkins III, MD

## 2022-11-10 NOTE — PROGRESS NOTES
"Subjective:       Patient ID: Talon Leblanc Jr. is a 47 y.o. male.    Vitals:  height is 5' 7" (1.702 m) and weight is 97.5 kg (215 lb). His temperature is 97.6 °F (36.4 °C). His blood pressure is 142/89 (abnormal) and his pulse is 85. His respiration is 16 and oxygen saturation is 99%.     Chief Complaint: URI    URI   This is a new problem. The current episode started yesterday. The problem has been waxing and waning. There has been no fever. Associated symptoms include coughing. Pertinent negatives include no congestion, diarrhea, headaches, nausea, sinus pain, sneezing, sore throat or vomiting. Associated symptoms comments: Runny nose/ itchy throat. He has tried nothing for the symptoms.     HENT:  Negative for congestion, sinus pain and sore throat.    Respiratory:  Positive for cough.    Gastrointestinal:  Negative for nausea, vomiting and diarrhea.   Skin:  Negative for erythema.   Allergic/Immunologic: Negative for sneezing.   Neurological:  Negative for headaches.     Objective:      Physical Exam   Constitutional: He is oriented to person, place, and time. He appears well-developed.   HENT:   Head: Normocephalic and atraumatic. Head is without abrasion, without contusion and without laceration.   Ears:   Right Ear: External ear normal.   Left Ear: External ear normal.   Oropharyngeal exam not performed due to risk of viral transmission during global pandemic-- risks outweigh benefits of exam          Comments: Oropharyngeal exam not performed due to risk of viral transmission during global pandemic-- risks outweigh benefits of exam      Eyes: Conjunctivae, EOM and lids are normal. Pupils are equal, round, and reactive to light.   Neck: Trachea normal and phonation normal. Neck supple.   Cardiovascular: Normal rate, regular rhythm and normal heart sounds.   Pulmonary/Chest: Effort normal and breath sounds normal. No stridor. No respiratory distress.   Musculoskeletal: Normal range of motion.    "      General: Normal range of motion.   Neurological: He is alert and oriented to person, place, and time.   Skin: Skin is warm, dry, intact and no rash. Capillary refill takes less than 2 seconds. No abrasion, No burn, No bruising, No erythema and No ecchymosis   Psychiatric: His speech is normal and behavior is normal. Judgment and thought content normal.   Nursing note and vitals reviewed.      Assessment:       1. COVID-19    2. Cough, unspecified type    3. Upper respiratory tract infection, unspecified type          Plan:         COVID-19    Cough, unspecified type  -     POCT Influenza A/B Molecular  -     SARS Coronavirus 2 Antigen, POCT Manual Read    Upper respiratory tract infection, unspecified type    Other orders  -     nirmatrelvir-ritonavir (PAXLOVID, EUA,) 300 mg (150 mg x 2)-100 mg copackaged tablets (EUA); Take 3 tablets by mouth 2 (two) times daily. Each dose contains 2 nirmatrelvir (pink tablets) and 1 ritonavir (white tablet). Take all 3 tablets together  Dispense: 30 tablet; Refill: 0                 3      Patient Instructions   Go to the Emergency Room if symptoms or condition worsens in any way     Stop taking rovustatin while taking Paxlovid      Choose 1 of the 3 medicines below and take as directed for the next 5-7 days    Zyrtec 10mg 1 tablet by mouth daily  OR  2. Claritin 10mg 1 tablet by mouth daily  OR  3. Allegra  180mg 1 tablet by mouth daily       Tylenol 500mg 2 tabs by mouth every 8 hours  Motrin 200mg 2 tabs by mouth every 8 hours  Alternate Tylenol and Motrin every 4hours      Mucinex or Robitussin OTC as directed for cough    Coricidin OTC as directed for runny nose and congestion    Hot liquids with natural honey for cough, sore throat, and congestion          Instructions for Patients with Confirmed or Suspected COVID-19    If you are awaiting your test result, you will either be called or it will be released to the patient portal.  If you have any questions about your  test, please visit [email or web address] or call our COVID-19 information line at [phone].      Stay home and stay away from family members and friends. The CDC says, you can leave home after these three things have happened: 1) You have had no fever for at least 24 hours (that is 1 full day of no fever without the use of medicine that reduces fevers) 2) AND other symptoms have improved (for example, when your cough or shortness of breath have improved) 3) AND at least 5 days have passed since your symptoms first appeared.  Separate yourself from other people and animals in your home.  Call ahead before visiting your doctor.  Wear a facemask.  Cover your coughs and sneezes.  Wash your hands often with soap and water; hand  can be used, too.  Avoid sharing personal household items.  Wipe down surfaces used daily.  Monitor your symptoms. Seek prompt medical attention if your illness is worsening (e.g., difficulty breathing).   Before seeking care, call your healthcare provider.  If you have a medical emergency and need to call 911, notify the dispatch personnel that you have, or are being evaluated for COVID-19. If possible, put on a facemask before emergency medical services arrive.        Recommended precautions for household members, intimate partners, and caregivers in a home setting of a patient with symptomatic laboratory-confirmed COVID-19 or a patient under investigation.  Household members, intimate partners, and caregivers in the home setting awaiting tests results have close contact with a person with symptomatic, laboratory-confirmed COVID-19 or a person under investigation. Close contacts should monitor their health; they should call their provider right away if they develop symptoms suggestive of COVID-19 (e.g., fever, cough, shortness of breath).    Close contacts should also follow these recommendations:  Make sure that you understand and can help the patient follow their  provider's instructions for medication(s) and care. You should help the patient with basic needs in the home and provide support for getting groceries, prescriptions, and other personal needs.  Monitor the patient's symptoms. If the patient is getting sicker, call his or her healthcare provider and tell them that the patient has laboratory-confirmed COVID-19. If the patient has a medical emergency and you need to call 911, notify the dispatch personnel that the patient has, or is being evaluated for COVID-19.  Household members should stay in another room or be  from the patient. Household members should use a separate bedroom and bathroom, if available.  Prohibit visitors.  Household members should care for any pets in the home.  Make sure that shared spaces in the home have good air flow, such as by an air conditioner or an opened window, weather permitting.  Perform hand hygiene frequently. Wash your hands often with soap and water for at least 20 seconds or use an alcohol-based hand  (that contains > 60% alcohol) covering all surfaces of your hands and rubbing them together until they feel dry. Soap and water should be used preferentially.  Avoid touching your eyes, nose, and mouth.  The patient should wear a facemask. If the patient is not able to wear a facemask (for example, because it causes trouble breathing), caregivers should wear a mask when they are in the same room as the patient.  Wear a disposable facemask and gloves when you touch or have contact with the patient's blood, stool, or body fluids, such as saliva, sputum, nasal mucus, vomit, urine.  Throw out disposable facemasks and gloves after using them. Do not reuse.  When removing personal protective equipment, first remove and dispose of gloves. Then, immediately clean your hands with soap and water or alcohol-based hand . Next, remove and dispose of facemask, and immediately clean your hands again with soap and water  or alcohol-based hand .  You should not share dishes, drinking glasses, cups, eating utensils, towels, bedding, or other items with the patient. After the patient uses these items, you should wash them thoroughly (see below Wash laundry thoroughly).  Clean all high-touch surfaces, such as counters, tabletops, doorknobs, bathroom fixtures, toilets, phones, keyboards, tablets, and bedside tables, every day. Also, clean any surfaces that may have blood, stool, or body fluids on them.  Use a household cleaning spray or wipe, according to the label instructions. Labels contain instructions for safe and effective use of the cleaning product including precautions you should take when applying the product, such as wearing gloves and making sure you have good ventilation during use of the product.  Wash laundry thoroughly.  Immediately remove and wash clothes or bedding that have blood, stool, or body fluids on them.  Wear disposable gloves while handling soiled items and keep soiled items away from your body. Clean your hands (with soap and water or an alcohol-based hand ) immediately after removing your gloves.  Read and follow directions on labels of laundry or clothing items and detergent. In general, using a normal laundry detergent according to washing machine instructions and dry thoroughly using the warmest temperatures recommended on the clothing label.  Place all used disposable gloves, facemasks, and other contaminated items in a lined container before disposing of them with other household waste. Clean your hands (with soap and water or an alcohol-based hand ) immediately after handling these items. Soap and water should be used preferentially if hands are visibly dirty.  Discuss any additional questions with your state or local health department or healthcare provider. Check available hours when contacting your local health department.    For more information see CDC link below.       https://www.cdc.gov/coronavirus/2019-ncov/hcp/guidance-prevent-spread.html#precautions        Sources:  [facility] [place], [place] [facility] and [facility]

## 2022-11-10 NOTE — PATIENT INSTRUCTIONS
Go to the Emergency Room if symptoms or condition worsens in any way     Stop taking rovustatin while taking Paxlovid      Choose 1 of the 3 medicines below and take as directed for the next 5-7 days    Zyrtec 10mg 1 tablet by mouth daily  OR  2. Claritin 10mg 1 tablet by mouth daily  OR  3. Allegra  180mg 1 tablet by mouth daily       Tylenol 500mg 2 tabs by mouth every 8 hours  Motrin 200mg 2 tabs by mouth every 8 hours  Alternate Tylenol and Motrin every 4hours      Mucinex or Robitussin OTC as directed for cough    Coricidin OTC as directed for runny nose and congestion    Hot liquids with natural honey for cough, sore throat, and congestion          Instructions for Patients with Confirmed or Suspected COVID-19    If you are awaiting your test result, you will either be called or it will be released to the patient portal.  If you have any questions about your test, please visit [email or web address] or call our COVID-19 information line at [phone].      Stay home and stay away from family members and friends. The CDC says, you can leave home after these three things have happened: 1) You have had no fever for at least 24 hours (that is 1 full day of no fever without the use of medicine that reduces fevers) 2) AND other symptoms have improved (for example, when your cough or shortness of breath have improved) 3) AND at least 5 days have passed since your symptoms first appeared.  Separate yourself from other people and animals in your home.  Call ahead before visiting your doctor.  Wear a facemask.  Cover your coughs and sneezes.  Wash your hands often with soap and water; hand  can be used, too.  Avoid sharing personal household items.  Wipe down surfaces used daily.  Monitor your symptoms. Seek prompt medical attention if your illness is worsening (e.g., difficulty breathing).   Before seeking care, call your healthcare provider.  If you have a medical emergency and need to call 911,  notify the dispatch personnel that you have, or are being evaluated for COVID-19. If possible, put on a facemask before emergency medical services arrive.        Recommended precautions for household members, intimate partners, and caregivers in a home setting of a patient with symptomatic laboratory-confirmed COVID-19 or a patient under investigation.  Household members, intimate partners, and caregivers in the home setting awaiting tests results have close contact with a person with symptomatic, laboratory-confirmed COVID-19 or a person under investigation. Close contacts should monitor their health; they should call their provider right away if they develop symptoms suggestive of COVID-19 (e.g., fever, cough, shortness of breath).    Close contacts should also follow these recommendations:  Make sure that you understand and can help the patient follow their provider's instructions for medication(s) and care. You should help the patient with basic needs in the home and provide support for getting groceries, prescriptions, and other personal needs.  Monitor the patient's symptoms. If the patient is getting sicker, call his or her healthcare provider and tell them that the patient has laboratory-confirmed COVID-19. If the patient has a medical emergency and you need to call 911, notify the dispatch personnel that the patient has, or is being evaluated for COVID-19.  Household members should stay in another room or be  from the patient. Household members should use a separate bedroom and bathroom, if available.  Prohibit visitors.  Household members should care for any pets in the home.  Make sure that shared spaces in the home have good air flow, such as by an air conditioner or an opened window, weather permitting.  Perform hand hygiene frequently. Wash your hands often with soap and water for at least 20 seconds or use an alcohol-based hand  (that contains > 60% alcohol) covering all surfaces of  your hands and rubbing them together until they feel dry. Soap and water should be used preferentially.  Avoid touching your eyes, nose, and mouth.  The patient should wear a facemask. If the patient is not able to wear a facemask (for example, because it causes trouble breathing), caregivers should wear a mask when they are in the same room as the patient.  Wear a disposable facemask and gloves when you touch or have contact with the patient's blood, stool, or body fluids, such as saliva, sputum, nasal mucus, vomit, urine.  Throw out disposable facemasks and gloves after using them. Do not reuse.  When removing personal protective equipment, first remove and dispose of gloves. Then, immediately clean your hands with soap and water or alcohol-based hand . Next, remove and dispose of facemask, and immediately clean your hands again with soap and water or alcohol-based hand .  You should not share dishes, drinking glasses, cups, eating utensils, towels, bedding, or other items with the patient. After the patient uses these items, you should wash them thoroughly (see below Wash laundry thoroughly).  Clean all high-touch surfaces, such as counters, tabletops, doorknobs, bathroom fixtures, toilets, phones, keyboards, tablets, and bedside tables, every day. Also, clean any surfaces that may have blood, stool, or body fluids on them.  Use a household cleaning spray or wipe, according to the label instructions. Labels contain instructions for safe and effective use of the cleaning product including precautions you should take when applying the product, such as wearing gloves and making sure you have good ventilation during use of the product.  Wash laundry thoroughly.  Immediately remove and wash clothes or bedding that have blood, stool, or body fluids on them.  Wear disposable gloves while handling soiled items and keep soiled items away from your body. Clean your hands (with soap and water or an  alcohol-based hand ) immediately after removing your gloves.  Read and follow directions on labels of laundry or clothing items and detergent. In general, using a normal laundry detergent according to washing machine instructions and dry thoroughly using the warmest temperatures recommended on the clothing label.  Place all used disposable gloves, facemasks, and other contaminated items in a lined container before disposing of them with other household waste. Clean your hands (with soap and water or an alcohol-based hand ) immediately after handling these items. Soap and water should be used preferentially if hands are visibly dirty.  Discuss any additional questions with your state or local health department or healthcare provider. Check available hours when contacting your local health department.    For more information see CDC link below.      https://www.cdc.gov/coronavirus/2019-ncov/hcp/guidance-prevent-spread.html#precautions        Sources:  [facility], [place] [facility] and [place] [facility]

## 2022-12-16 ENCOUNTER — OFFICE VISIT (OUTPATIENT)
Dept: URGENT CARE | Facility: CLINIC | Age: 47
End: 2022-12-16
Payer: COMMERCIAL

## 2022-12-16 VITALS
SYSTOLIC BLOOD PRESSURE: 150 MMHG | HEART RATE: 78 BPM | OXYGEN SATURATION: 98 % | RESPIRATION RATE: 18 BRPM | WEIGHT: 215 LBS | DIASTOLIC BLOOD PRESSURE: 92 MMHG | TEMPERATURE: 98 F | BODY MASS INDEX: 33.74 KG/M2 | HEIGHT: 67 IN

## 2022-12-16 DIAGNOSIS — R09.82 PND (POST-NASAL DRIP): ICD-10-CM

## 2022-12-16 DIAGNOSIS — H10.32 ACUTE BACTERIAL CONJUNCTIVITIS OF LEFT EYE: Primary | ICD-10-CM

## 2022-12-16 DIAGNOSIS — R05.8 COUGH WITH CONGESTION OF PARANASAL SINUS: ICD-10-CM

## 2022-12-16 DIAGNOSIS — R09.81 COUGH WITH CONGESTION OF PARANASAL SINUS: ICD-10-CM

## 2022-12-16 DIAGNOSIS — J02.9 SORE THROAT: ICD-10-CM

## 2022-12-16 DIAGNOSIS — R05.8 POST-VIRAL COUGH SYNDROME: ICD-10-CM

## 2022-12-16 LAB
CTP QC/QA: YES
SARS-COV-2 AG RESP QL IA.RAPID: NEGATIVE

## 2022-12-16 PROCEDURE — 3077F PR MOST RECENT SYSTOLIC BLOOD PRESSURE >= 140 MM HG: ICD-10-PCS | Mod: CPTII,S$GLB,, | Performed by: PHYSICIAN ASSISTANT

## 2022-12-16 PROCEDURE — 3008F PR BODY MASS INDEX (BMI) DOCUMENTED: ICD-10-PCS | Mod: CPTII,S$GLB,, | Performed by: PHYSICIAN ASSISTANT

## 2022-12-16 PROCEDURE — 3080F PR MOST RECENT DIASTOLIC BLOOD PRESSURE >= 90 MM HG: ICD-10-PCS | Mod: CPTII,S$GLB,, | Performed by: PHYSICIAN ASSISTANT

## 2022-12-16 PROCEDURE — 87811 SARS CORONAVIRUS 2 ANTIGEN POCT, MANUAL READ: ICD-10-PCS | Mod: QW,S$GLB,, | Performed by: PHYSICIAN ASSISTANT

## 2022-12-16 PROCEDURE — 99214 OFFICE O/P EST MOD 30 MIN: CPT | Mod: S$GLB,,, | Performed by: PHYSICIAN ASSISTANT

## 2022-12-16 PROCEDURE — 3044F PR MOST RECENT HEMOGLOBIN A1C LEVEL <7.0%: ICD-10-PCS | Mod: CPTII,S$GLB,, | Performed by: PHYSICIAN ASSISTANT

## 2022-12-16 PROCEDURE — 99214 PR OFFICE/OUTPT VISIT, EST, LEVL IV, 30-39 MIN: ICD-10-PCS | Mod: S$GLB,,, | Performed by: PHYSICIAN ASSISTANT

## 2022-12-16 PROCEDURE — 87811 SARS-COV-2 COVID19 W/OPTIC: CPT | Mod: QW,S$GLB,, | Performed by: PHYSICIAN ASSISTANT

## 2022-12-16 PROCEDURE — 4010F ACE/ARB THERAPY RXD/TAKEN: CPT | Mod: CPTII,S$GLB,, | Performed by: PHYSICIAN ASSISTANT

## 2022-12-16 PROCEDURE — 4010F PR ACE/ARB THEARPY RXD/TAKEN: ICD-10-PCS | Mod: CPTII,S$GLB,, | Performed by: PHYSICIAN ASSISTANT

## 2022-12-16 PROCEDURE — 3077F SYST BP >= 140 MM HG: CPT | Mod: CPTII,S$GLB,, | Performed by: PHYSICIAN ASSISTANT

## 2022-12-16 PROCEDURE — 3080F DIAST BP >= 90 MM HG: CPT | Mod: CPTII,S$GLB,, | Performed by: PHYSICIAN ASSISTANT

## 2022-12-16 PROCEDURE — 3008F BODY MASS INDEX DOCD: CPT | Mod: CPTII,S$GLB,, | Performed by: PHYSICIAN ASSISTANT

## 2022-12-16 PROCEDURE — 3044F HG A1C LEVEL LT 7.0%: CPT | Mod: CPTII,S$GLB,, | Performed by: PHYSICIAN ASSISTANT

## 2022-12-16 RX ORDER — POLYMYXIN B SULFATE AND TRIMETHOPRIM 1; 10000 MG/ML; [USP'U]/ML
1 SOLUTION OPHTHALMIC EVERY 6 HOURS
Qty: 10 ML | Refills: 0 | Status: SHIPPED | OUTPATIENT
Start: 2022-12-16 | End: 2022-12-23

## 2022-12-16 NOTE — PROGRESS NOTES
"Subjective:       Patient ID: Talon Leblanc Jr. is a 47 y.o. male.    Vitals:  height is 5' 7" (1.702 m) and weight is 97.5 kg (215 lb). His temperature is 98.3 °F (36.8 °C). His blood pressure is 150/92 (abnormal) and his pulse is 78. His respiration is 18 and oxygen saturation is 98%.   Body mass index is 33.67 kg/m².      Chief Complaint: Eye Pain    47-year-old male with history of glaucoma managed by Dr. Emily MUÑIZ, obesity BMI 33, hypertension, prediabetes, carpal tunnel syndrome, and other comorbidities who presents urgent care clinic for evaluation.  Had recent COVID-19 infection 11/09/2022.  Treated with Paxlovid.  Reports improvement in symptoms since Paxil of it.  Has chronic intermittent productive cough, runny nose, and nasal congestion that he is not taking any medication for.  No other associated symptoms.  Yesterday his left eye was itching so he kept rubbing it incessantly.  This morning woke up with left eye redness, eyelid swelling, and thick discharge.  No fever, chills, neck stiffness, blurry vision, ocular pain, photophobia/phonophobia, or any other complaints.  Requesting evaluation.  Wears glasses but does not wear contact lens.      Medical assistant note:  Pt states last visit tested positive for COVID-19.     Eye Pain   The left eye is affected. This is a new problem. The current episode started yesterday. The problem occurs constantly. The problem has been unchanged. Injury mechanism: Constantly rubbing eye. The pain is at a severity of 4/10. The pain is mild. There is No known exposure to pink eye. He Does not wear contacts. Associated symptoms include an eye discharge, eye redness and itching. Pertinent negatives include no blurred vision, double vision, fever, foreign body sensation, nausea, photophobia, recent URI or vomiting.     Constitution: Negative for activity change, chills, sweating, fatigue, fever and generalized weakness.   HENT:  Positive for congestion, " postnasal drip, sinus pressure and sore throat. Negative for ear pain, hearing loss, facial swelling, sinus pain, trouble swallowing and voice change.    Neck: Negative for neck pain, neck stiffness and painful lymph nodes.   Cardiovascular:  Negative for chest pain, leg swelling, palpitations, sob on exertion and passing out.   Eyes:  Positive for eye discharge, eye itching, eye redness and eyelid swelling. Negative for eye trauma, foreign body in eye, eye pain, photophobia, vision loss, double vision and blurred vision.   Respiratory:  Positive for cough and sputum production. Negative for chest tightness, COPD, shortness of breath, wheezing and asthma.    Gastrointestinal:  Negative for abdominal pain, nausea, vomiting, diarrhea, bright red blood in stool, dark colored stools, rectal bleeding, heartburn and bowel incontinence.   Genitourinary:  Negative for dysuria, frequency, urgency, urine decreased, flank pain, bladder incontinence, hematuria and history of kidney stones.   Musculoskeletal:  Negative for trauma, joint pain, joint swelling, abnormal ROM of joint, muscle cramps and muscle ache.   Skin:  Negative for color change, pale, rash and wound.   Allergic/Immunologic: Positive for chronic cough. Negative for seasonal allergies, asthma and immunocompromised state.   Neurological:  Negative for dizziness, history of vertigo, light-headedness, passing out, facial drooping, speech difficulty, coordination disturbances, loss of balance, headaches, disorientation, altered mental status, loss of consciousness, numbness, tingling and seizures.   Hematologic/Lymphatic: Negative for swollen lymph nodes, easy bruising/bleeding and trouble clotting. Does not bruise/bleed easily.   Psychiatric/Behavioral:  Negative for altered mental status and disorientation.        Past Medical History:   Diagnosis Date    Glaucoma     Hypertension     Multiple thyroid nodules     Prediabetes        Objective:      Physical Exam    Constitutional: He is oriented to person, place, and time. He appears well-developed. He is cooperative.  Non-toxic appearance. He does not appear ill. No distress.      Comments:Well-appearing     HENT:   Head: Normocephalic and atraumatic.   Ears:   Right Ear: Hearing, external ear and ear canal normal. No no drainage, swelling or tenderness.   Left Ear: Hearing, external ear and ear canal normal. No no drainage, swelling or tenderness.   Nose: Nose normal. No rhinorrhea, purulent discharge or congestion. Right sinus exhibits no maxillary sinus tenderness and no frontal sinus tenderness. Left sinus exhibits no maxillary sinus tenderness and no frontal sinus tenderness.   Mouth/Throat: Uvula is midline, oropharynx is clear and moist and mucous membranes are normal. Mucous membranes are moist. No oral lesions. No trismus in the jaw. No uvula swelling. No oropharyngeal exudate, posterior oropharyngeal edema or posterior oropharyngeal erythema. No tonsillar exudate. Oropharynx is clear.   Eyes: EOM and lids are normal. Pupils are equal, round, and reactive to light. No visual field deficit is present. Right eye exhibits no discharge and no hordeolum. Left eye exhibits discharge. Left eye exhibits no hordeolum. Right conjunctiva is not injected. Right conjunctiva has no hemorrhage. Left conjunctiva is injected. Left conjunctiva has no hemorrhage. Extraocular movement intact vision grossly intact gaze aligned appropriately      Comments: No preseptal edema, erythema or tenderness to palpation.      Mild left eyelid erythema and minimal swelling; minimal crusty and purulent discharge present.     No direct or peripheral visual deficit.    No pain with extraocular motion.    No foreign body visualized.       Neck: Neck supple. No neck rigidity present.   Cardiovascular: Normal rate, regular rhythm, normal heart sounds and normal pulses.   No murmur heard.  Pulmonary/Chest: Effort normal and breath sounds normal. No  accessory muscle usage or stridor. No respiratory distress. He has no wheezes. He exhibits no tenderness.   Abdominal: Normal appearance. He exhibits no distension and no mass. Soft. There is no abdominal tenderness. There is no rebound and no guarding.   Musculoskeletal: Normal range of motion.         General: Normal range of motion.      Right lower leg: No edema.      Left lower leg: No edema.      Comments: Moves all extremities with normal tone, strength, and ROM.  Gait normal.   Lymphadenopathy:     He has no cervical adenopathy.   Neurological: no focal deficit. He is alert, oriented to person, place, and time and at baseline. He has normal motor skills and normal sensation. He displays no weakness, facial symmetry, normal reflexes and no dysarthria. No cranial nerve deficit or sensory deficit. He exhibits normal muscle tone. He has a normal Finger-Nose-Finger Test. Coordination: Heel to shin test normal. He shows no pronator drift. He displays no seizure activity. Gait and coordination normal. Coordination normal. GCS eye subscore is 4. GCS verbal subscore is 5. GCS motor subscore is 6.   Skin: Skin is warm, dry, not diaphoretic and no rash. Capillary refill takes less than 2 seconds.   Psychiatric: His speech is normal and behavior is normal. Thought content normal.   Nursing note and vitals reviewed.      Results for orders placed or performed in visit on 12/16/22   SARS Coronavirus 2 Antigen, POCT Manual Read   Result Value Ref Range    SARS Coronavirus 2 Antigen Negative Negative     Acceptable Yes          Assessment:       1. Acute bacterial conjunctivitis of left eye    2. Sore throat    3. Post-viral cough syndrome    4. PND (post-nasal drip)    5. Cough with congestion of paranasal sinus        Nontoxic appearing. Vitals are stable. Rapid covid negative.   All diagnostic testing personally reviewed and interpreted.   Patient has symptoms at this time which is consistent with above  diagnosis.        Patient was recommended OTC treatments for their symptoms.   Patient was also prescribed medications for their symptoms.     Emphasized the importance of OTC symptomatic treatment for improvements in symptoms and prevent further worsening of conditioning.  Recommend oral antihistamine q.a.m., antihistamine nasal spray 1-2 times per day and Mucinex DM Q 12 hour given his chronic postviral cough syndrome that is untreated.    As for his left bacterial infection, prescribed antibiotic drops.  He ready has follow-up scheduled with Ophthalmology on 12/28/2022.  No concern for worsening glaucoma or bending changes.  Discussed strict return precautions.     Patient was counseled, explained with the test results meaning, expected course, and answered all of questions. They can also receive results via my chart.  Printed and verbal treatment guidelines/recommendations were given.   Recommend follow-up PCP in the next 2-3 days if new or worsening symptoms.    Patient understands that they received an Urgent Care treatment only and that they may be released before all your medical problems are known or treated. Strict ED versus clinic precautions given.  Patient verbalized understanding and agreed with plan of care.    Note dictated with voice recognition software, please excuse any grammatical errors.    Plan:         Acute bacterial conjunctivitis of left eye  -     polymyxin B sulf-trimethoprim (POLYTRIM) 10,000 unit- 1 mg/mL Drop; Place 1 drop into the left eye every 6 (six) hours. for 7 days  Dispense: 10 mL; Refill: 0    Sore throat  -     SARS Coronavirus 2 Antigen, POCT Manual Read    Post-viral cough syndrome    PND (post-nasal drip)    Cough with congestion of paranasal sinus              Additional MDM:     Heart Failure Score:   COPD = No    Patient Instructions   PLEASE READ YOUR DISCHARGE INSTRUCTIONS ENTIRELY AS IT CONTAINS IMPORTANT INFORMATION.  - use eyedrops in affected eye as directed for  7 days.    - continue heat/ice compression as needed.  Do not touch eyes with hand. May wipe eyes with tissue and discard after each use.  - if no improvement or worsening symptoms, recommend follow-up with ophthalmology for further evaluation.     FOR YOUR UPPER RESPIRATORY SYMPTOMS:  - Tylenol or Ibuprofen (or other OTC anti-inflammatory) as directed as needed for fever/pain.  For Tylenol, do not exceed 4000 mg/ day. For ibuprofen, do not exceed 2400 mg/day.    -Below are OTC suggestions for symptomatic relief:              -Tylenol every 4 hours OR ibuprofen every 6 hours as needed for pain/fever.              -Salt water gargles to soothe throat pain.              -Chloroseptic spray, lozenges, or cough drops also helps to numb throat pain.              -Nasal saline spray reduces inflammation and dryness.              -Warm face compresses to help with facial sinus pain/pressure.              -Vicks vapor rub at night.              -Flonase OTC or Nasacort OTC for nasal congestion.              -Simple foods like chicken noodle soup.              -Mucinex (or with DM) during day time. Delsym helps with coughing at night              -Zyrtec/Claritin during the day & Benadryl at night may help with allergies.  -If you DO NOT have Hypertension or any history of palpitations, it is ok to take over the counter Sudafed or Mucinex D or Allegra-D or Claritin-D or Zyrtec-D.  -If you do take one of the above, it is ok to combine that with plain over the counter Mucinex or Allegra or Claritin or Zyrtec. If, for example, you are taking Zyrtec -D, you can combine that with Mucinex, but not Mucinex-D.  If you are taking Mucinex-D, you can combine that with plain Allegra or Claritin or Zyrtec.   -If you DO have Hypertension or palpitations, it is safe to take Coricidin HBP for relief of sinus symptoms.                              -You must understand that you've received an Urgent Care treatment only and that you may be  released before all your medical problems are known or treated. You, the patient, will arrange for follow up care as instructed. Please arrange follow up with your primary medical clinic within 2-5 days if your signs and symptoms have not resolved or worsen.   - Follow up with your PCP or specialty clinic as directed.  You can call [phone] or [phone] to schedule an appointment with the appropriate provider.    - If your condition worsens or fails to improve, we recommend that you receive another evaluation at the emergency room immediately or contact your primary medical clinic to discuss your concerns  in next 2-5 days.  Strict clinic versus ER precautions given.     RED FLAGS/WARNING SYMPTOMS DISCUSSED WITH PATIENT THAT WOULD WARRANT EMERGENT MEDICAL ATTENTION. Patient aware and verbalized understanding.    Conjunctivitis, Bacterial    You have an infection in the membranes covering the white part of the eye. This part of the eye is called the conjunctiva. The infection is called conjunctivitis. The most common symptoms of conjunctivitis include a thick, pus-like discharge from the eye, swollen eyelids, redness, eyelids sticking together upon awakening, and a gritty or scratchy feeling in the eye. Your infection was caused by bacteria. It may be treated with medicine. With treatment, the infection takes about 7 to 10 days to resolve.  Home care  Use prescribed antibiotic eye drops or ointment as directed to treat the infection.  Apply a warm compress (towel soaked in warm water) to the affected eye 3 to 4 times a day. Do this just before applying medicine to the eye.  Use a warm, wet cloth to wipe away crusting of the eyelids in the morning. This is caused by mucus drainage during the night. You may also use saline irrigating solution or artificial tears to rinse away mucus in the eye. Do not put a patch over the eye.  Wash your hands before and after touching the infected eye. This is to prevent  spreading the infection to the other eye, and to other people. Do not share your towels or washcloths with others.  You may use acetaminophen or ibuprofen to control pain, unless another medicine was prescribed. (Note: If you have chronic liver or kidney disease or have ever had a stomach ulcer or gastrointestinal bleeding, talk with your doctor before using these medicines.)  Do not wear contact lenses until your eyes have healed and all symptoms are gone.  Follow-up care  Follow up with your healthcare provider, or as advised.  When to seek medical advice  Call your healthcare provider right away if any of these occur:  Worsening vision  Increasing pain in the eye  Increasing swelling or redness of the eyelid  Redness spreading around the eye  Date Last Reviewed: 6/14/2015  © 6161-3449 [employer]. [address] [place] [address], [place] [address]. All rights reserved. This information is not intended as a substitute for professional medical care. Always follow your healthcare professional's instructions.

## 2022-12-16 NOTE — PATIENT INSTRUCTIONS
PLEASE READ YOUR DISCHARGE INSTRUCTIONS ENTIRELY AS IT CONTAINS IMPORTANT INFORMATION.  - use eyedrops in affected eye as directed for 7 days.    - continue heat/ice compression as needed.  Do not touch eyes with hand. May wipe eyes with tissue and discard after each use.  - if no improvement or worsening symptoms, recommend follow-up with ophthalmology for further evaluation.     FOR YOUR UPPER RESPIRATORY SYMPTOMS:  - Tylenol or Ibuprofen (or other OTC anti-inflammatory) as directed as needed for fever/pain.  For Tylenol, do not exceed 4000 mg/ day. For ibuprofen, do not exceed 2400 mg/day.    -Below are OTC suggestions for symptomatic relief:              -Tylenol every 4 hours OR ibuprofen every 6 hours as needed for pain/fever.              -Salt water gargles to soothe throat pain.              -Chloroseptic spray, lozenges, or cough drops also helps to numb throat pain.              -Nasal saline spray reduces inflammation and dryness.              -Warm face compresses to help with facial sinus pain/pressure.              -Vicks vapor rub at night.              -Flonase OTC or Nasacort OTC for nasal congestion.              -Simple foods like chicken noodle soup.              -Mucinex (or with DM) during day time. Delsym helps with coughing at night              -Zyrtec/Claritin during the day & Benadryl at night may help with allergies.  -If you DO NOT have Hypertension or any history of palpitations, it is ok to take over the counter Sudafed or Mucinex D or Allegra-D or Claritin-D or Zyrtec-D.  -If you do take one of the above, it is ok to combine that with plain over the counter Mucinex or Allegra or Claritin or Zyrtec. If, for example, you are taking Zyrtec -D, you can combine that with Mucinex, but not Mucinex-D.  If you are taking Mucinex-D, you can combine that with plain Allegra or Claritin or Zyrtec.   -If you DO have Hypertension or palpitations, it is safe to take Coricidin HBP for relief of sinus  symptoms.                              -You must understand that you've received an Urgent Care treatment only and that you may be released before all your medical problems are known or treated. You, the patient, will arrange for follow up care as instructed. Please arrange follow up with your primary medical clinic within 2-5 days if your signs and symptoms have not resolved or worsen.   - Follow up with your PCP or specialty clinic as directed.  You can call [phone] or [phone] to schedule an appointment with the appropriate provider.    - If your condition worsens or fails to improve, we recommend that you receive another evaluation at the emergency room immediately or contact your primary medical clinic to discuss your concerns  in next 2-5 days.  Strict clinic versus ER precautions given.     RED FLAGS/WARNING SYMPTOMS DISCUSSED WITH PATIENT THAT WOULD WARRANT EMERGENT MEDICAL ATTENTION. Patient aware and verbalized understanding.    Conjunctivitis, Bacterial    You have an infection in the membranes covering the white part of the eye. This part of the eye is called the conjunctiva. The infection is called conjunctivitis. The most common symptoms of conjunctivitis include a thick, pus-like discharge from the eye, swollen eyelids, redness, eyelids sticking together upon awakening, and a gritty or scratchy feeling in the eye. Your infection was caused by bacteria. It may be treated with medicine. With treatment, the infection takes about 7 to 10 days to resolve.  Home care  Use prescribed antibiotic eye drops or ointment as directed to treat the infection.  Apply a warm compress (towel soaked in warm water) to the affected eye 3 to 4 times a day. Do this just before applying medicine to the eye.  Use a warm, wet cloth to wipe away crusting of the eyelids in the morning. This is caused by mucus drainage during the night. You may also use saline irrigating solution or artificial tears to rinse away mucus  in the eye. Do not put a patch over the eye.  Wash your hands before and after touching the infected eye. This is to prevent spreading the infection to the other eye, and to other people. Do not share your towels or washcloths with others.  You may use acetaminophen or ibuprofen to control pain, unless another medicine was prescribed. (Note: If you have chronic liver or kidney disease or have ever had a stomach ulcer or gastrointestinal bleeding, talk with your doctor before using these medicines.)  Do not wear contact lenses until your eyes have healed and all symptoms are gone.  Follow-up care  Follow up with your healthcare provider, or as advised.  When to seek medical advice  Call your healthcare provider right away if any of these occur:  Worsening vision  Increasing pain in the eye  Increasing swelling or redness of the eyelid  Redness spreading around the eye  Date Last Reviewed: 6/14/2015  © 0646-4752 [employer]. [address] [place] [address], [place] [address]. All rights reserved. This information is not intended as a substitute for professional medical care. Always follow your healthcare professional's instructions.

## 2022-12-16 NOTE — LETTER
[address] [facility] ? [place], [address] ? Phone [phone] ? Fax [phone]           Return to Work/School    Patient: Talon Leblanc Jr.  YOB: 1975   Date: 12/16/2022      To Whom It May Concern:     Talon Leblanc Jr. was in contact with/seen in my office on 12/16/2022. COVID-19 is present in our communities across the state. Not all patients are eligible or appropriate to be tested. In this situation, your employee meets the following criteria:     Talon Leblanc Jr. has met the criteria for COVID-19 testing and has a NEGATIVE result. The employee can return to work once they are asymptomatic for 24 hours without the use of fever reducing medications (Tylenol, Motrin, etc).  Okay to return to work 12/18/2022.     If you have any questions or concerns, or if I can be of further assistance, please do not hesitate to contact me.     Sincerely,    Jermaine Vogt PA-C

## 2023-03-28 ENCOUNTER — PATIENT MESSAGE (OUTPATIENT)
Dept: RESEARCH | Facility: HOSPITAL | Age: 48
End: 2023-03-28
Payer: COMMERCIAL

## 2023-04-14 ENCOUNTER — PATIENT MESSAGE (OUTPATIENT)
Dept: RESEARCH | Facility: HOSPITAL | Age: 48
End: 2023-04-14
Payer: COMMERCIAL

## 2023-04-21 ENCOUNTER — PATIENT MESSAGE (OUTPATIENT)
Dept: ADMINISTRATIVE | Facility: HOSPITAL | Age: 48
End: 2023-04-21
Payer: COMMERCIAL

## 2023-04-28 ENCOUNTER — OFFICE VISIT (OUTPATIENT)
Dept: INTERNAL MEDICINE | Facility: CLINIC | Age: 48
End: 2023-04-28
Payer: COMMERCIAL

## 2023-04-28 ENCOUNTER — HOSPITAL ENCOUNTER (OUTPATIENT)
Dept: RADIOLOGY | Facility: HOSPITAL | Age: 48
Discharge: HOME | End: 2023-04-28
Attending: INTERNAL MEDICINE
Payer: COMMERCIAL

## 2023-04-28 VITALS
HEIGHT: 67 IN | OXYGEN SATURATION: 99 % | HEART RATE: 70 BPM | DIASTOLIC BLOOD PRESSURE: 82 MMHG | BODY MASS INDEX: 34.46 KG/M2 | WEIGHT: 219.56 LBS | SYSTOLIC BLOOD PRESSURE: 134 MMHG

## 2023-04-28 DIAGNOSIS — R73.03 PREDIABETES: ICD-10-CM

## 2023-04-28 DIAGNOSIS — M79.671 CHRONIC FOOT PAIN, RIGHT: ICD-10-CM

## 2023-04-28 DIAGNOSIS — Z87.11 PERSONAL HISTORY OF GASTRIC ULCER: ICD-10-CM

## 2023-04-28 DIAGNOSIS — I10 ESSENTIAL HYPERTENSION: ICD-10-CM

## 2023-04-28 DIAGNOSIS — Z00.00 ENCOUNTER FOR ANNUAL PHYSICAL EXAM: Primary | ICD-10-CM

## 2023-04-28 DIAGNOSIS — G89.29 CHRONIC FOOT PAIN, RIGHT: ICD-10-CM

## 2023-04-28 DIAGNOSIS — E78.5 HYPERLIPIDEMIA, UNSPECIFIED HYPERLIPIDEMIA TYPE: ICD-10-CM

## 2023-04-28 DIAGNOSIS — B35.1 ONYCHOMYCOSIS: ICD-10-CM

## 2023-04-28 DIAGNOSIS — G56.03 BILATERAL CARPAL TUNNEL SYNDROME: ICD-10-CM

## 2023-04-28 DIAGNOSIS — Z12.11 COLON CANCER SCREENING: ICD-10-CM

## 2023-04-28 DIAGNOSIS — Z00.00 LABORATORY EXAM ORDERED AS PART OF ROUTINE GENERAL MEDICAL EXAMINATION: ICD-10-CM

## 2023-04-28 DIAGNOSIS — E04.2 MULTINODULAR GOITER (NONTOXIC): ICD-10-CM

## 2023-04-28 PROCEDURE — 3075F SYST BP GE 130 - 139MM HG: CPT | Mod: CPTII,S$GLB,, | Performed by: INTERNAL MEDICINE

## 2023-04-28 PROCEDURE — 3008F BODY MASS INDEX DOCD: CPT | Mod: CPTII,S$GLB,, | Performed by: INTERNAL MEDICINE

## 2023-04-28 PROCEDURE — 3079F PR MOST RECENT DIASTOLIC BLOOD PRESSURE 80-89 MM HG: ICD-10-PCS | Mod: CPTII,S$GLB,, | Performed by: INTERNAL MEDICINE

## 2023-04-28 PROCEDURE — 1159F PR MEDICATION LIST DOCUMENTED IN MEDICAL RECORD: ICD-10-PCS | Mod: CPTII,S$GLB,, | Performed by: INTERNAL MEDICINE

## 2023-04-28 PROCEDURE — 99396 PR PREVENTIVE VISIT,EST,40-64: ICD-10-PCS | Mod: S$GLB,,, | Performed by: INTERNAL MEDICINE

## 2023-04-28 PROCEDURE — 99396 PREV VISIT EST AGE 40-64: CPT | Mod: S$GLB,,, | Performed by: INTERNAL MEDICINE

## 2023-04-28 PROCEDURE — 99999 PR PBB SHADOW E&M-EST. PATIENT-LVL V: CPT | Mod: PBBFAC,,, | Performed by: INTERNAL MEDICINE

## 2023-04-28 PROCEDURE — 99999 PR PBB SHADOW E&M-EST. PATIENT-LVL V: ICD-10-PCS | Mod: PBBFAC,,, | Performed by: INTERNAL MEDICINE

## 2023-04-28 PROCEDURE — 3008F PR BODY MASS INDEX (BMI) DOCUMENTED: ICD-10-PCS | Mod: CPTII,S$GLB,, | Performed by: INTERNAL MEDICINE

## 2023-04-28 PROCEDURE — 3075F PR MOST RECENT SYSTOLIC BLOOD PRESS GE 130-139MM HG: ICD-10-PCS | Mod: CPTII,S$GLB,, | Performed by: INTERNAL MEDICINE

## 2023-04-28 PROCEDURE — 4010F ACE/ARB THERAPY RXD/TAKEN: CPT | Mod: CPTII,S$GLB,, | Performed by: INTERNAL MEDICINE

## 2023-04-28 PROCEDURE — 73630 X-RAY EXAM OF FOOT: CPT | Mod: 26,RT,, | Performed by: RADIOLOGY

## 2023-04-28 PROCEDURE — 3079F DIAST BP 80-89 MM HG: CPT | Mod: CPTII,S$GLB,, | Performed by: INTERNAL MEDICINE

## 2023-04-28 PROCEDURE — 4010F PR ACE/ARB THEARPY RXD/TAKEN: ICD-10-PCS | Mod: CPTII,S$GLB,, | Performed by: INTERNAL MEDICINE

## 2023-04-28 PROCEDURE — 73630 XR FOOT COMPLETE 3 VIEW RIGHT: ICD-10-PCS | Mod: 26,RT,, | Performed by: RADIOLOGY

## 2023-04-28 PROCEDURE — 73630 X-RAY EXAM OF FOOT: CPT | Mod: TC,RT

## 2023-04-28 PROCEDURE — 1159F MED LIST DOCD IN RCRD: CPT | Mod: CPTII,S$GLB,, | Performed by: INTERNAL MEDICINE

## 2023-04-28 RX ORDER — ROSUVASTATIN CALCIUM 10 MG/1
10 TABLET, COATED ORAL DAILY
Qty: 90 TABLET | Refills: 3 | Status: SHIPPED | OUTPATIENT
Start: 2023-04-28 | End: 2023-08-22

## 2023-04-28 RX ORDER — LOSARTAN POTASSIUM 25 MG/1
25 TABLET ORAL DAILY
Qty: 90 TABLET | Refills: 3 | Status: SHIPPED | OUTPATIENT
Start: 2023-04-28

## 2023-04-28 NOTE — PATIENT INSTRUCTIONS
Fasting labwork today  X-ray of right foot today  Schedule ultrasound of thyroid  Schedule endocrinology appointment  Schedule podiatry appointment    Colonoscopy - a colonoscopy has been ordered for you. In order to schedule this appointment, please call [phone].     Return to clinic in 6 months or sooner if needed.
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
## 2023-05-01 ENCOUNTER — TELEPHONE (OUTPATIENT)
Dept: INTERNAL MEDICINE | Facility: CLINIC | Age: 48
End: 2023-05-01
Payer: COMMERCIAL

## 2023-05-01 DIAGNOSIS — E79.0 HYPERURICEMIA: Primary | ICD-10-CM

## 2023-05-01 DIAGNOSIS — M79.671 CHRONIC FOOT PAIN, RIGHT: ICD-10-CM

## 2023-05-01 DIAGNOSIS — G89.29 CHRONIC FOOT PAIN, RIGHT: ICD-10-CM

## 2023-05-01 RX ORDER — ALLOPURINOL 100 MG/1
100 TABLET ORAL DAILY
Qty: 90 TABLET | Refills: 3 | Status: SHIPPED | OUTPATIENT
Start: 2023-05-01 | End: 2023-08-22

## 2023-05-01 NOTE — TELEPHONE ENCOUNTER
----- Message from Giancarlo Covarrubias MD sent at 4/28/2023  5:32 PM CDT -----  No fracture seen on X-ray. Follow up with podiatry as discussed.

## 2023-05-02 ENCOUNTER — TELEPHONE (OUTPATIENT)
Dept: INTERNAL MEDICINE | Facility: CLINIC | Age: 48
End: 2023-05-02
Payer: COMMERCIAL

## 2023-05-05 ENCOUNTER — TELEPHONE (OUTPATIENT)
Dept: INTERNAL MEDICINE | Facility: CLINIC | Age: 48
End: 2023-05-05
Payer: COMMERCIAL

## 2023-05-08 ENCOUNTER — OFFICE VISIT (OUTPATIENT)
Dept: PODIATRY | Facility: CLINIC | Age: 48
End: 2023-05-08
Payer: COMMERCIAL

## 2023-05-08 VITALS
DIASTOLIC BLOOD PRESSURE: 77 MMHG | HEART RATE: 64 BPM | BODY MASS INDEX: 34.84 KG/M2 | WEIGHT: 222 LBS | SYSTOLIC BLOOD PRESSURE: 123 MMHG | HEIGHT: 67 IN

## 2023-05-08 DIAGNOSIS — R60.9 SWELLING: Primary | ICD-10-CM

## 2023-05-08 DIAGNOSIS — G89.29 CHRONIC FOOT PAIN, RIGHT: ICD-10-CM

## 2023-05-08 DIAGNOSIS — M79.671 CHRONIC FOOT PAIN, RIGHT: ICD-10-CM

## 2023-05-08 PROCEDURE — 3008F PR BODY MASS INDEX (BMI) DOCUMENTED: ICD-10-PCS | Mod: CPTII,S$GLB,, | Performed by: PODIATRIST

## 2023-05-08 PROCEDURE — 99203 OFFICE O/P NEW LOW 30 MIN: CPT | Mod: S$GLB,,, | Performed by: PODIATRIST

## 2023-05-08 PROCEDURE — 99999 PR PBB SHADOW E&M-EST. PATIENT-LVL III: ICD-10-PCS | Mod: PBBFAC,,, | Performed by: PODIATRIST

## 2023-05-08 PROCEDURE — 3044F HG A1C LEVEL LT 7.0%: CPT | Mod: CPTII,S$GLB,, | Performed by: PODIATRIST

## 2023-05-08 PROCEDURE — 99203 PR OFFICE/OUTPT VISIT, NEW, LEVL III, 30-44 MIN: ICD-10-PCS | Mod: S$GLB,,, | Performed by: PODIATRIST

## 2023-05-08 PROCEDURE — 3078F PR MOST RECENT DIASTOLIC BLOOD PRESSURE < 80 MM HG: ICD-10-PCS | Mod: CPTII,S$GLB,, | Performed by: PODIATRIST

## 2023-05-08 PROCEDURE — 3078F DIAST BP <80 MM HG: CPT | Mod: CPTII,S$GLB,, | Performed by: PODIATRIST

## 2023-05-08 PROCEDURE — 3044F PR MOST RECENT HEMOGLOBIN A1C LEVEL <7.0%: ICD-10-PCS | Mod: CPTII,S$GLB,, | Performed by: PODIATRIST

## 2023-05-08 PROCEDURE — 1159F PR MEDICATION LIST DOCUMENTED IN MEDICAL RECORD: ICD-10-PCS | Mod: CPTII,S$GLB,, | Performed by: PODIATRIST

## 2023-05-08 PROCEDURE — 1159F MED LIST DOCD IN RCRD: CPT | Mod: CPTII,S$GLB,, | Performed by: PODIATRIST

## 2023-05-08 PROCEDURE — 3074F SYST BP LT 130 MM HG: CPT | Mod: CPTII,S$GLB,, | Performed by: PODIATRIST

## 2023-05-08 PROCEDURE — 3074F PR MOST RECENT SYSTOLIC BLOOD PRESSURE < 130 MM HG: ICD-10-PCS | Mod: CPTII,S$GLB,, | Performed by: PODIATRIST

## 2023-05-08 PROCEDURE — 3008F BODY MASS INDEX DOCD: CPT | Mod: CPTII,S$GLB,, | Performed by: PODIATRIST

## 2023-05-08 PROCEDURE — 4010F PR ACE/ARB THEARPY RXD/TAKEN: ICD-10-PCS | Mod: CPTII,S$GLB,, | Performed by: PODIATRIST

## 2023-05-08 PROCEDURE — 99999 PR PBB SHADOW E&M-EST. PATIENT-LVL III: CPT | Mod: PBBFAC,,, | Performed by: PODIATRIST

## 2023-05-08 PROCEDURE — 4010F ACE/ARB THERAPY RXD/TAKEN: CPT | Mod: CPTII,S$GLB,, | Performed by: PODIATRIST
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
## 2023-06-01 ENCOUNTER — HOSPITAL ENCOUNTER (OUTPATIENT)
Dept: RADIOLOGY | Facility: HOSPITAL | Age: 48
Discharge: HOME | End: 2023-06-01
Attending: INTERNAL MEDICINE
Payer: COMMERCIAL

## 2023-06-01 DIAGNOSIS — E04.2 MULTINODULAR GOITER (NONTOXIC): ICD-10-CM

## 2023-06-01 PROCEDURE — 76536 US EXAM OF HEAD AND NECK: CPT | Mod: TC

## 2023-06-01 PROCEDURE — 76536 US SOFT TISSUE HEAD NECK THYROID: ICD-10-PCS | Mod: 26,,, | Performed by: RADIOLOGY

## 2023-06-01 PROCEDURE — 76536 US EXAM OF HEAD AND NECK: CPT | Mod: 26,,, | Performed by: RADIOLOGY

## 2023-06-07 ENCOUNTER — TELEPHONE (OUTPATIENT)
Dept: ENDOCRINOLOGY | Facility: CLINIC | Age: 48
End: 2023-06-07
Payer: COMMERCIAL

## 2023-07-03 ENCOUNTER — OFFICE VISIT (OUTPATIENT)
Dept: URGENT CARE | Facility: CLINIC | Age: 48
End: 2023-07-03
Payer: COMMERCIAL

## 2023-07-03 VITALS
HEART RATE: 92 BPM | RESPIRATION RATE: 17 BRPM | WEIGHT: 210 LBS | HEIGHT: 67 IN | DIASTOLIC BLOOD PRESSURE: 85 MMHG | OXYGEN SATURATION: 97 % | TEMPERATURE: 98 F | BODY MASS INDEX: 32.96 KG/M2 | SYSTOLIC BLOOD PRESSURE: 144 MMHG

## 2023-07-03 DIAGNOSIS — I10 PRIMARY HYPERTENSION: ICD-10-CM

## 2023-07-03 DIAGNOSIS — K12.0 APHTHOUS ULCER: Primary | ICD-10-CM

## 2023-07-03 DIAGNOSIS — H61.21 IMPACTED CERUMEN OF RIGHT EAR: ICD-10-CM

## 2023-07-03 PROCEDURE — 99214 PR OFFICE/OUTPT VISIT, EST, LEVL IV, 30-39 MIN: ICD-10-PCS | Mod: S$GLB,,, | Performed by: FAMILY MEDICINE

## 2023-07-03 PROCEDURE — 99214 OFFICE O/P EST MOD 30 MIN: CPT | Mod: S$GLB,,, | Performed by: FAMILY MEDICINE

## 2023-07-03 RX ORDER — LIDOCAINE HYDROCHLORIDE 20 MG/ML
SOLUTION OROPHARYNGEAL 4 TIMES DAILY PRN
Qty: 20 ML | Refills: 0 | Status: SHIPPED | OUTPATIENT
Start: 2023-07-03

## 2023-07-03 NOTE — PROGRESS NOTES
"Subjective:      Patient ID: Talon Leblanc Jr. is a 48 y.o. male.    Vitals:  height is 5' 7" (1.702 m) and weight is 95.3 kg (210 lb). His temperature is 97.7 °F (36.5 °C). His blood pressure is 144/85 (abnormal) and his pulse is 92. His respiration is 17 and oxygen saturation is 97%.     Chief Complaint: Oral Pain    Pt states painful sore on tongue for last few weeks. States it hurts only on chewing and eating.  Denies any sore throat, cough, fever, headache, nausea/vomiting.  Blood pressure is found to be mildly elevated.  Patient is compliant with his blood pressure medicines.  Denies any chest pain, SOB, weakness.    Oral Pain   This is a chronic problem. The current episode started more than 1 month ago. The problem occurs constantly. The problem has been unchanged. The pain is at a severity of 8/10. Pertinent negatives include no difficulty swallowing, facial pain, fever, oral bleeding, sinus pressure or thermal sensitivity. He has tried nothing for the symptoms.     Constitution: Negative for fever.   HENT:  Negative for sinus pressure.     Objective:     Physical Exam   Constitutional: He is oriented to person, place, and time. He appears well-developed. He is cooperative.  Non-toxic appearance. He does not appear ill. No distress.   HENT:   Head: Normocephalic and atraumatic.   Ears:   Right Ear: Hearing, tympanic membrane, external ear and ear canal normal. impacted cerumen  Left Ear: Hearing, tympanic membrane, external ear and ear canal normal. impacted cerumen     Comments: Post ear lavage ear exam WNL  Nose: Nose normal. No mucosal edema, rhinorrhea, nasal deformity or congestion. No epistaxis. Right sinus exhibits no maxillary sinus tenderness and no frontal sinus tenderness. Left sinus exhibits no maxillary sinus tenderness and no frontal sinus tenderness.   Mouth/Throat: Uvula is midline, oropharynx is clear and moist and mucous membranes are normal. No trismus in the jaw. Normal " dentition. No uvula swelling. No oropharyngeal exudate, posterior oropharyngeal edema or posterior oropharyngeal erythema.      Comments:   Solitary Aphthous ulcer on right lateral aspect of tongue.  No obvious swelling.  No facial swelling.    No swollen lymph nodes.  Eyes: Conjunctivae and lids are normal. No scleral icterus.   Neck: Trachea normal and phonation normal. Neck supple. No edema present. No erythema present. No neck rigidity present.   Cardiovascular: Normal rate, regular rhythm, normal heart sounds and normal pulses.   Pulmonary/Chest: Effort normal and breath sounds normal. No respiratory distress. He has no decreased breath sounds. He has no rhonchi.   Abdominal: Normal appearance.   Musculoskeletal: Normal range of motion.         General: No deformity. Normal range of motion.   Neurological: He is alert and oriented to person, place, and time. He exhibits normal muscle tone. Coordination normal.   Skin: Skin is warm, dry, intact, not diaphoretic and not pale.   Psychiatric: His speech is normal and behavior is normal. Judgment and thought content normal.   Nursing note and vitals reviewed.    Assessment:     1. Aphthous ulcer    2. Primary hypertension    3. Impacted cerumen of right ear        Plan:   Discussed exam findings/diagnosis/plan with patient in clinic.  Blood pressure precautions advised. Advised to f/u with PCP within 2-5 days. All questions answered. Patient verbally understood and agreed with treatment plan.     Aphthous ulcer    Primary hypertension    Impacted cerumen of right ear  -     Ear wax removal    Other orders  -     diphenhydrAMINE-aluminum-magnesium hydroxide-simethicone-LIDOcaine HCl 2%; Swish and spit 10 mLs every 6 (six) hours as needed (oral pain).  Dispense: 240 each; Refill: 0  -     LIDOcaine HCl 2% (LIDOCAINE VISCOUS) 2 % Soln; by Mucous Membrane route 4 (four) times daily as needed (Tongue pain). Apply with a Q-tip to tongue sore before eating  Dispense: 20  mL; Refill: 0

## 2023-08-21 NOTE — PROGRESS NOTES
Subjective:      Patient ID: Talon Leblanc Jr. is a 48 y.o. male.    Chief Complaint:  No chief complaint on file.    History of Present Illness  Talon Leblanc Jr. presents for evaluation & management of thyroid nodule. Previous patient of Dr Rolle. Last seen in 2/2019. This is his first visit with me.     The thyroid nodule was found on 2008. He felt the nodule on his neck. He went to endo at [facility] and reports he had nodule drained and lost to follow up. He saw Dr Rolle in 2019. Had biopsies and lost to follow up.     Last thyroid u/s: 2023    No FH of thyroid cancer  No personal history of radiation treatment or exposure     No difficulty breathing swallowing   No voice changes    No signs or symptoms of hyper or hypothyroidism    No weight changes  No bowel changes  No heat or cold intolerance   No hair nail or skin changes  No cp, palpations or sob    Any blood thinners- denies     FNA 2/2019  FINAL PATHOLOGIC DIAGNOSIS THYROID, LEFT LOBE DOMINANT NODULE, FINE-NEEDLE ASPIRATION: Benign. Scattered rare benign follicular cells, thin watery colloid, and macrophages, consistent with hyperplastic/adenomatoid nodule. Negative for features of papillary thyroid carcinoma    FINAL PATHOLOGIC DIAGNOSIS THYROID, ISTHMUS NODULE, FINE-NEEDLE ASPIRATION: Benign. Benign follicular cells, thin watery colloid, and numerous macrophages, consistent with hyperplastic/adenomatoid nodule. Negative for cytologic features of papillary thyroid carcinoma    6/1/2023 US thyroid  Impression:     Bilateral and isthmus TR 4 to TR 5 nodules with 1 of them increased size compared to 2018.  Reported prior biopsy with no clear results.  Recommend repeat FNA.  Left lobe TR 3 nodule not well delineated in prior study, requiring follow-up at 1, 3 and 5 year per ACR TI-RADS criteria.    Lab Results   Component Value Date    TSH 0.660 04/28/2023    FREET4 0.90 03/14/2011     Also with prediabetes,     FH of diabetes in  "father  Never been on medications for diabetes  Not following any kind of diet or exercise. Does not want to start medications today.       Lab Results   Component Value Date    HGBA1C 6.2 (H) 04/28/2023     With regards to dyslipidemia,     He is on Crestor 10 mg daily     Latest Reference Range & Units 04/28/23 09:48   Cholesterol 120 - 199 mg/dL 243 (H)   HDL 40 - 75 mg/dL 40   HDL/Cholesterol Ratio 20.0 - 50.0 % 16.5 (L)   LDL Cholesterol External 63.0 - 159.0 mg/dL 164.2 (H)   Non-HDL Cholesterol mg/dL 203   Total Cholesterol/HDL Ratio 2.0 - 5.0  6.1 (H)   Triglycerides 30 - 150 mg/dL 194 (H)   (H): Data is abnormally high  (L): Data is abnormally low    Review of Systems   Constitutional:  Negative for fatigue and unexpected weight change.   Eyes:  Negative for visual disturbance.   Endocrine: Negative for polydipsia, polyphagia and polyuria.   And as above     Objective:   Physical Exam  Constitutional:       Appearance: He is obese.   Neck:      Thyroid: Thyromegaly present.      Comments: Irregularly shaped thyroid with nodules on right, left and isthmus  Pulmonary:      Effort: Pulmonary effort is normal.   Neurological:      Mental Status: He is alert.         Visit Vitals  /76   Pulse 90   Ht 5' 7" (1.702 m)   Wt 97.6 kg (215 lb 2.7 oz)   SpO2 98%   BMI 33.70 kg/m²        Lab Review:   Lab Results   Component Value Date    HGBA1C 6.2 (H) 04/28/2023    HGBA1C 6.0 (H) 07/26/2022    HGBA1C 6.1 (H) 11/03/2021      Lab Results   Component Value Date    CHOL 243 (H) 04/28/2023    HDL 40 04/28/2023    LDLCALC 164.2 (H) 04/28/2023    TRIG 194 (H) 04/28/2023    CHOLHDL 16.5 (L) 04/28/2023     Lab Results   Component Value Date     04/28/2023    K 4.7 04/28/2023     04/28/2023    CO2 23 04/28/2023     04/28/2023    BUN 19 04/28/2023    CREATININE 1.1 04/28/2023    CALCIUM 9.8 04/28/2023    PROT 7.7 04/28/2023    ALBUMIN 4.4 04/28/2023    BILITOT 1.0 04/28/2023    ALKPHOS 73 04/28/2023    " AST 21 04/28/2023    ALT 29 04/28/2023    ANIONGAP 12 04/28/2023    ESTGFRAFRICA >60.0 11/03/2021    EGFRNONAA >60.0 11/03/2021    TSH 0.660 04/28/2023     Vit D, 25-Hydroxy   Date Value Ref Range Status   03/14/2011 25 (L) >30 ng/mL Final     Comment:     Vitamin D, 25-Hydroxy:  Vitamin D deficiency <10 ng/mL  Vitamin D insufficiency 10-30 ng/mL  Vitamin D sufficiency >30 ng/mL     Assessment and Plan     1. Multinodular goiter (nontoxic)  US FNA Thyroid, 1st Lesion    Ambulatory referral/consult to Endocrinology      2. Prediabetes        3. Dyslipidemia  rosuvastatin (CRESTOR) 20 MG tablet          Multinodular goiter (nontoxic)  Benign biopsies of isthmus and left nodules in 2019  Sounds like he had cyst drained on right in 2008 -not seen on last US  Denies compressive symptoms  Reviewed US from 2023-recommend re-biopsy of left sided 4.1 cm nodule  He agrees to FNA   Recheck US in one year    Declines thyroid surgery discussion at this time    Prediabetes   A1c is 6.2%   He would like to work on lifestyle changes prior to starting medications    Given information on diet    Dyslipidemia  LDL goal closer to 100  LDL above goal   Increase crestor to 20 mg daily  Check LP in future    Follow up in about 6 months (around 2/22/2024).

## 2023-08-22 ENCOUNTER — OFFICE VISIT (OUTPATIENT)
Dept: ENDOCRINOLOGY | Facility: CLINIC | Age: 48
End: 2023-08-22
Payer: COMMERCIAL

## 2023-08-22 VITALS
WEIGHT: 215.19 LBS | HEART RATE: 90 BPM | DIASTOLIC BLOOD PRESSURE: 76 MMHG | BODY MASS INDEX: 33.78 KG/M2 | SYSTOLIC BLOOD PRESSURE: 124 MMHG | HEIGHT: 67 IN | OXYGEN SATURATION: 98 %

## 2023-08-22 DIAGNOSIS — E78.5 DYSLIPIDEMIA: ICD-10-CM

## 2023-08-22 DIAGNOSIS — E04.2 MULTINODULAR GOITER (NONTOXIC): Primary | ICD-10-CM

## 2023-08-22 DIAGNOSIS — R73.03 PREDIABETES: ICD-10-CM

## 2023-08-22 PROCEDURE — 3008F BODY MASS INDEX DOCD: CPT | Mod: CPTII,S$GLB,, | Performed by: NURSE PRACTITIONER

## 2023-08-22 PROCEDURE — 3074F SYST BP LT 130 MM HG: CPT | Mod: CPTII,S$GLB,, | Performed by: NURSE PRACTITIONER

## 2023-08-22 PROCEDURE — 3078F DIAST BP <80 MM HG: CPT | Mod: CPTII,S$GLB,, | Performed by: NURSE PRACTITIONER

## 2023-08-22 PROCEDURE — 3008F PR BODY MASS INDEX (BMI) DOCUMENTED: ICD-10-PCS | Mod: CPTII,S$GLB,, | Performed by: NURSE PRACTITIONER

## 2023-08-22 PROCEDURE — 99204 OFFICE O/P NEW MOD 45 MIN: CPT | Mod: S$GLB,,, | Performed by: NURSE PRACTITIONER

## 2023-08-22 PROCEDURE — 1160F PR REVIEW ALL MEDS BY PRESCRIBER/CLIN PHARMACIST DOCUMENTED: ICD-10-PCS | Mod: CPTII,S$GLB,, | Performed by: NURSE PRACTITIONER

## 2023-08-22 PROCEDURE — 99204 PR OFFICE/OUTPT VISIT, NEW, LEVL IV, 45-59 MIN: ICD-10-PCS | Mod: S$GLB,,, | Performed by: NURSE PRACTITIONER

## 2023-08-22 PROCEDURE — 3044F HG A1C LEVEL LT 7.0%: CPT | Mod: CPTII,S$GLB,, | Performed by: NURSE PRACTITIONER

## 2023-08-22 PROCEDURE — 99999 PR PBB SHADOW E&M-EST. PATIENT-LVL IV: ICD-10-PCS | Mod: PBBFAC,,, | Performed by: NURSE PRACTITIONER

## 2023-08-22 PROCEDURE — 3074F PR MOST RECENT SYSTOLIC BLOOD PRESSURE < 130 MM HG: ICD-10-PCS | Mod: CPTII,S$GLB,, | Performed by: NURSE PRACTITIONER

## 2023-08-22 PROCEDURE — 1159F PR MEDICATION LIST DOCUMENTED IN MEDICAL RECORD: ICD-10-PCS | Mod: CPTII,S$GLB,, | Performed by: NURSE PRACTITIONER

## 2023-08-22 PROCEDURE — 1159F MED LIST DOCD IN RCRD: CPT | Mod: CPTII,S$GLB,, | Performed by: NURSE PRACTITIONER

## 2023-08-22 PROCEDURE — 99999 PR PBB SHADOW E&M-EST. PATIENT-LVL IV: CPT | Mod: PBBFAC,,, | Performed by: NURSE PRACTITIONER

## 2023-08-22 PROCEDURE — 4010F ACE/ARB THERAPY RXD/TAKEN: CPT | Mod: CPTII,S$GLB,, | Performed by: NURSE PRACTITIONER

## 2023-08-22 PROCEDURE — 4010F PR ACE/ARB THEARPY RXD/TAKEN: ICD-10-PCS | Mod: CPTII,S$GLB,, | Performed by: NURSE PRACTITIONER

## 2023-08-22 PROCEDURE — 3044F PR MOST RECENT HEMOGLOBIN A1C LEVEL <7.0%: ICD-10-PCS | Mod: CPTII,S$GLB,, | Performed by: NURSE PRACTITIONER

## 2023-08-22 PROCEDURE — 3078F PR MOST RECENT DIASTOLIC BLOOD PRESSURE < 80 MM HG: ICD-10-PCS | Mod: CPTII,S$GLB,, | Performed by: NURSE PRACTITIONER

## 2023-08-22 PROCEDURE — 1160F RVW MEDS BY RX/DR IN RCRD: CPT | Mod: CPTII,S$GLB,, | Performed by: NURSE PRACTITIONER

## 2023-08-22 RX ORDER — ROSUVASTATIN CALCIUM 20 MG/1
20 TABLET, COATED ORAL DAILY
Qty: 90 TABLET | Refills: 3 | Status: SHIPPED | OUTPATIENT
Start: 2023-08-22 | End: 2024-08-21

## 2023-08-22 NOTE — PATIENT INSTRUCTIONS
"Thyroid Nodules    Biopsy of left sided 4.1 cm nodule    A FNA, or fine needle aspiration, is a biopsy of your thyroid nodule. You will have an ultrasound to find the thyroid nodule. The doctor will use lidocaine spray to numb neck. A needle is inserted into your neck 4-8 times to obtain a representative sample of the entire thyroid nodule. The thyroid tissue is sent off to pathology. The pathology results can take around 1 week to result. The thyroid pathology can come back as: 1. Benign meaning no cancer; 2. Malignant meaning cancer; 3. Indeterminate meaning that we need an additional sample (repeat the test) because either A. We did not obtain enough sample (just like when your PAP is inconclusive) or B. The sample needs further testing (repeat the test).     Cholesterol    Increase Crestor to 20 mg daily    High cholesterol foods to avoid/limit:     C: Cheese, milk, dairy  A: Animal Fats: hot dogs and hamburgers  G: "Getting it away from home": going out to eat a lot  E: Extra Fat: cookies, candy, and sweets  F: Family History    Remember high cholesterol can clog your arteries and increase risk for heart attack or stroke.     Prediabetes   Your A1c is 6.2%   A1c of diabetes is 6.5%    He would like to work on lifestyle changes prior to starting medications     Snacks can be an important part of a balanced, healthy meal plan. They allow you to eat more frequently, feeling full and satisfied throughout the day. Also, they allow you to spread carbohydrates evenly, which may stabilize blood sugars.  Plus, snacks are enjoyable!     The amount of carbohydrate needed at snacks varies. Generally, about 15-30 grams of carbohydrate per snack is recommended.  Below you will find some tasty treats.       0-5 gm carb  Crystal Light  Vitamin Water Zero  Herbal tea, unsweetened  2 tsp peanut butter on celery  1./2 cup sugar-free jell-o  1 sugar-free popsicle  ¼ cup blueberries  8oz Blue Fannie unsweetened almond milk  5 baby " carrots & celery sticks, cucumbers, bell peppers dipped in ¼ cup salsa, 2Tbsp light ranch dressing or 2Tbsp plain Greek yogurt  10 Goldfish crackers  ½ oz low-fat cheese or string cheese  1 closed handful of nuts, unsalted  1 Tbsp of sunflower seeds, unsalted  1 cup Smart Pop popcorn  1 whole grain brown rice cake        15 gm carb  1 small piece of fruit or ½ banana or 1/2 cup lite canned fruit  3 kimberley cracker squares  3 cups Smart Pop popcorn, top spray butter, Aquino lite salt or cinnamon and Truvia  5 Vanilla Wafers  ½ cup low fat, no added sugar ice cream or frozen yogurt (Blue bell, Blue Bunny, Weight Watchers, Skinny Cow)  ½ turkey, ham, or chicken sandwich  ½ c fruit with ½ c Cottage cheese  4-6 unsalted wheat crackers with 1 oz low fat cheese or 1 tbsp peanut butter   30-45 goldfish crackers (depending on flavor)   7-8 Sabianism mini brown rice cakes (caramel, apple cinnamon, chocolate)   12 Sabianism mini brown rice cakes (cheddar, bbq, ranch)   1/3 cup hummus dip with raw veg  1/2 whole wheat louann, 1Tbsp hummus  Mini Pizza (1/2 whole wheat English muffin, low-fat  cheese, tomato sauce)  100 calorie snack pack (Oreo, Chips Ahoy, Ritz Mix, Baked Cheetos)  4-6 oz. light or Greek Style yogurt (Chobani, Yoplait, Okios, [place])  ½ cup sugar-free pudding    6 in. wheat tortilla or louann oven toasted chips (topped with spray butter flavoring, cinnamon, Truvia OR spray butter, garlic powder, chili powder)   18 BBQ Popchips (available at [employer], [employer], [employer])     Diabetic drinks we recommend:   Water -BEST  Crystal light sugar free packets  Gatorade zero   Powerade zero  Tea without sweetener    Diabetic drinks we do not recommend:  Coke or any sugary regular soft drink  Coffee with sugar  Milk  Juice  Beer  Liquor    Sweeteners we recommend:  Nelson Gary    Note: Caffeine can increase your blood sugar     Eating the Right Number of Calories (5119-6673 Guidelines)    Calories are a measure  of the energy you get from food. If you eat more calories than you use, you will gain weight. If you eat fewer calories than you use, you will lose weight. Below are tables that give the number of calories needed each day. Look for your gender, age, and activity level. If you stick to this number, you should neither gain nor lose weight. Note that this is an estimated number of calories.* Your exact number may differ.    Women  Age in years Low activity level (calories/day) Moderate activity level (calories/day) High activity level (calories/day)   19 to 30 1,800-2,000 2,000-2,200 2,400   31 to 50 1,800 2,000 2,200   51 and older 1,600 1,800 2,000-2,200      Men  Age in years Low activity level  (calories/day) Moderate activity level (calories/day) High activity level (calories/day)   19 to 30 2,400-2,600 2,600-2,800 3,000   31 to 50 2,200-2,400 2,400-2,600 2,800-3,000   51 and older 2,000-2,200 2,200-2,400 2,400-2,800     Activity levels defined  Low. Only light physical activity such as that done during typical daily life.  Moderate. Light physical activity done during typical daily life AND physical activity equal to walking about 1.5 to 3 miles a day at 3 to 4 miles per hour.  High. Light physical activity done during typical daily life AND physical activity equal to walking more than 3 miles a day at 3 to 4 miles per hour.  *From Dietary Guidelines for Americans, 9600-6737, [place] Department of Health and Human Services.    Eat less fat  A gram of fat has almost 2.5 times the calories of a gram of protein or carbohydrates. Try to balance your food choices so that only 20% to 35% of your calories comes from total fat. This means an average of 2½ to 3½ grams of fat for each 100 calories you eat.    Eat more fiber  High-fiber foods are digested more slowly than low-fiber foods, so you feel full longer. Try to get at least 25 grams of fiber each day for a 2000 calorie diet. Foods high in fiber include:  Vegetables  and fruits  Whole-grain or bran breads, pastas, and cereals  Legumes (beans) and peas  As you begin to eat more fiber, be sure to drink plenty of water to keep your digestive system working smoothly.    Tips  Do's and don'ts include:   Dont skip meals. This often leads to overeating later on. Its best to spread your eating throughout the day.  Eat a variety of foods, not just a few favorites.  If you find yourself eating when youre not hungry, ask yourself why. Many of us eat when were bored, stressed, or just to be polite. Listen to your body. If youre not hungry, get busy doing something else instead of eating.  Eat slower, shooting for 20 to 30 minutes for each meal. It takes 20 minutes for your stomach to tell your brain that its full. Slow eaters tend to eat less and are still satisfied, while fast eaters may tend to be overeaters.   Pay attention to what you eat. Dont read or watch TV during your meal.     ---------------------------------------------------------------------------------------------------------------------------------------------------    Losing Weight for Heart Health  Excess weight is a major risk factor for heart disease. Losing weight has many benefits including lowering your blood pressure, improving your cholesterol level, and decreasing your risk for diseases such as diabetes and heart disease. It may help keep your arteries open so that your heart can get the oxygen-rich blood it needs. All in all, losing weight makes you healthier.       Exercise with a friend. When activity is fun, you're more likely to stick with it.     Calories and weight loss  Calories are the fuel your body burns for energy. You get the calories you need from the food you eat. For healthy weight loss, women should eat at least 1,200 calories a day, men at least 1,500.  When you eat more calories than you need, your body stores the extra calories as fat. One pound of fat equals 3,500 calories.  To lose  weight, try to reduce your total calorie intake by 500 calories. To do this, eat 250 calories less each day. Add activity to burn the other 250 calories. Walking 2.5 miles burns about 250 calories. Other more intense activities can burn more calories in the time you spend doing them, such as swimming and running. It is important to understand that reducing calorie intake is much more effective at weight loss than is exercise.  Eat a variety of healthy foods to get the nutrients you need.    Tips for losing weight  Drink 8 to 10 glasses of water a day.  Dont skip meals. Instead, eat smaller portions.  Eat your meals earlier in the day.  Cut out sugary drinks such as soda and fruit juices.  Make your later meals lighter than your earlier meals.     What can exercise help?  Blood sugar. Regular exercise improves blood sugar control by helping your body use insulin.  Mental and emotional health. Physical activity relieves stress and helps you sleep better.  Heart health. With regular exercise, you can reduce your risk of heart disease and high blood pressure. You can also improve your cholesterol and triglyceride levels.  Weight. Exercise helps you lose fat, gain muscle, and control your weight.  Health of blood vessels and nerves. Activity helps lower blood sugar. This helps prevent damage to blood vessels and nerves that can cause problems with your brain, eyes, feet, and legs.  Finances. If you manage your blood sugar, you may spend less on medical care.    2 types of exercise  Two types of exercise help your body use blood sugar. Experts advise both types of exercise for people with diabetes:  Aerobic exercise. This is a rhythmic, repeated, continued movement of large muscle groups for at least 10 minutes at a time. You should do this about 30 minutes a day on most days of the week. Examples include walking, bicycling, jogging, swimming, water aerobics, and many sports.  Resistance exercise (strength training).  This type of exercise uses muscles to move weight or work against resistance. You can do it with free weights, machines, resistance tubing, or your own body weight. Adults with diabetes should aim for 2 to 3 sessions of resistance exercise each week. Its best to skip a day in between.    A goal to shoot for  Your main goal is to become more active. Even a little bit helps. Choose an activity that you like. Walking is one great form of exercise that everyone can do. Talk to your healthcare provider about any limits you may have before starting with an exercise program. Then aim for 150 minutes a week of physical activity. Dont let more than 2 days go by without exercise. When you are sitting for long periods of time, get up for short sessions of light activity every 30 minutes.    Getting activity into your day  Being more active doesnt have to be hard work. Try these to get more activity into your day:  Take the stairs instead of the elevator  Garden, do housework, and yard work  Choose a parking space farther from the store  Walk to talk to a co-worker instead of calling  Take a 10-minute walk around the block at lunch  Walk to a bus stop a little farther from your home or office  Walk the dog after dinner     ---------------------------------------------------------------------------------------------------------------------------------------------------    Reading Food Labels  Look for the Nutrition Facts label on packaged foods. Reading labels is a big step toward eating healthier. The tips below help you know what to look for.    Serving size. Read this closely because the package, jar, or can may contain more than 1 serving. This is how to measure 1 serving of the food in the package. If you eat more than 1 serving, you get more of everything on the label -- including fat, cholesterol, and calories.  Total fat. This tells you how many grams (g) of fat are in 1 serving. Fat is high in calories. A healthy  goal is to have less than 25% of your daily calories come from fat.  Saturated fat. This tells you how much saturated fat is in 1 serving. Saturated fat raises your cholesterol the most. Look for foods that have little or no saturated fat.  Trans fat. This tells you how much trans fat is in 1 serving. Even a small amount of trans fat can harm your health. Choose foods that have no trans fat.  Cholesterol. This tells you how much cholesterol is in 1 serving. For many years, it had been recommended to eat less than 300 milligrams (mg) of cholesterol a day. New guidelines have removed this limitation as cholesterol has been recently shown to not raise blood cholesterol levels as significantly as previously thought. However, many foods high in cholesterol are also high in saturated fat. It is recommended to limit saturated fat in your diet.  Calories from fat. This number tells you how many calories from fat are in 1 serving (there are 9 calories per gram of fat). Look for foods with few calories from fat.  % Daily value. The higher the number, the more 1 serving has of that nutrient. Look for foods that have low numbers for total fat, saturated fat, cholesterol, and sodium.  Sodium. This tells you how much sodium (salt) is in 1 serving. Choose foods with low numbers for sodium.  Dietary fiber. This number tells you how much fiber is in 1 serving. Foods that are high in fiber can help you feel full. They can also be good for your heart and digestion. The recommended daily amount of fiber is 25 grams for women and 38 grams for men. After age 50, your daily fiber needs drop to 21 grams for women and 30 grams for men.       ---------------------------------------------------------------------------------------------------------------------------------------------------    Understanding Carbohydrates, Fats, and Protein  Food is a source of fuel and nourishment for your body. Its also a source of pleasure. Having diabetes  doesnt mean you have to eat special foods or give up desserts. Instead, your dietitian can show you how to plan meals to suit your body. To start, learn how different foods affect blood sugar.    Carbohydrates  Carbohydrates are the main source of fuel for the body. Carbohydrates raise blood sugar. Many people think carbohydrates are only found in pasta or bread. But carbohydrates are actually in many kinds of foods:  Sugars occur naturally in foods such as fruit, milk, honey, and molasses. Sugars can also be added to many foods, from cereals and yogurt to candy and desserts. Sugars raise blood sugar.  Starches are found in bread, cereals, pasta, and dried beans. Theyre also found in corn, peas, potatoes, yam, acorn squash, and butternut squash. Starches also raise blood sugar.   Fiber is found in foods such as vegetables, fruits, beans, and whole grains. Unlike other carbs, fiber isnt digested or absorbed. So it doesnt raise blood sugar. In fact, fiber can help keep blood sugar from rising too fast. It also helps keep blood cholesterol at a healthy level.  Did you know?  Even though carbohydrates raise blood sugar, its best to have some in every meal. They are an important part of a healthy diet.     Fat  Fat is an energy source that can be stored until needed. Fat does not raise blood sugar. However, it can raise blood cholesterol, increasing the risk of heart disease. Fat is also high in calories, which can cause weight gain. Not all types of fat are the same.    More Healthy:  Monounsaturated fats are mostly found in vegetable oils, such as olive, canola, and peanut oils. They are also found in avocados and some nuts. Monounsaturated fats are healthy for your heart. Thats because they lower LDL (unhealthy) cholesterol.  Polyunsaturated fats are mostly found in vegetable oils, such as corn, safflower, and soybean oils. They are also found in some seeds, nuts, and fish. Polyunsaturated fats lower LDL  (unhealthy) cholesterol. So, choosing them instead of saturated fats is healthy for your heart. Certain unsaturated fats can help lower triglycerides.     Less Healthy:  Saturated fats are found in animal products, such as meat, poultry, whole milk, lard, and butter. Saturated fats raise LDL cholesterol and are not healthy for your heart.  Hydrogenated oils and trans fats are formed when vegetable oils are processed into solid fats. They are found in many processed foods. Hydrogenated oils and trans fats raise LDL cholesterol and lower HDL (healthy) cholesterol. They are not healthy for your heart.    Protein  Protein helps the body build and repair muscle and other tissue. Protein has little or no effect on blood sugar. However, many foods that contain protein also contain saturated fat. By choosing low-fat protein sources, you can get the benefits of protein without the extra fat:  Plant protein is found in dry beans and peas, nuts, and soy products, such as tofu and soymilk. These sources tend to be cholesterol-free and low in saturated fat.  Animal protein is found in fish, poultry, meat, cheese, milk, and eggs. These contain cholesterol and can be high in saturated fat. Aim for lean, lower-fat choices.    ---------------------------------------------------------------------------------------------------------------------------------------------------    Understanding Carbohydrates    A car needs the right type of fuel to run. And you need the right kind of food to function. To keep your energy level up, your body needs food that has carbohydrates. But carbohydrates raise blood sugar levels higher and faster than other kinds of food. Your dietitian will work with you to figure out the amount of carbohydrates you need.    Starches  Starches are found in grains, some vegetables, and beans. Grain products include bread, pasta, cereal, and tortillas. Starchy vegetables include potatoes, peas, corn, lima beans, yams,  and squash. Kidney beans, manzano beans, black beans, garbanzo beans, and lentils also contain starches.    Sugars  Sugars are found naturally in many foods. Or sugar can be added. Foods that contain natural sugar include fruits and fruit juices, dairy products, honey, and molasses. Added sugars are found in most desserts, processed foods, candy, regular soda, and fruit drinks. These are very helpful for treating low blood sugar, or hypoglycemia. They provide sugar quickly. Try to keep at least 15 to 20 grams of these simple sugars with you at all times. Eat this if you begin to have low blood sugar symptoms.    Fiber  Fiber comes from plant foods. Most fiber isnt digested by the body. Instead of raising blood sugar levels like other carbohydrates, it actually stops blood sugar from rising too fast. Fiber is found in fruits, vegetables, whole grains, beans, peas, and many nuts.    Carb counting  Keep track of the amount of carbohydrates you eat. This can help you keep the right balance of physical activity and medicine. The amount of carbohydrates needed will vary for each person. It depends on many things such as your health, the medicines you take, and how active you are. Your healthcare team will help you figure out the right amount of carbohydrates for you. You may start with around 45 to 60 grams of carbohydrate per meal, depending on your situation. Carb counting is a system that helps you keep track of the carbohydrates you eat at each meal.  Carbohydrates come from a variety of foods. These include grains, starchy vegetables, fruit, milk, beans, and snack foods. You can either count carbohydrate grams or carbohydrate servings. When you count carbohydrate servings, 1 carbohydrate serving = 15 grams of carbohydrates.  Here are some examples of foods containing about 15 grams of carbohydrates (1 serving of carbohydrates):  1/2 cup of canned or frozen fruit  A small piece of fresh fruit (4 ounces)  1 slice of  bread  1/2 cup of oatmeal  1/3 cup of rice  4 to 6 crackers  1/2 English muffin  1/2 cup of black beans  1/4 of a large baked potato (3 ounces)  2/3 cup of plain fat-free yogurt  1 cup of soup  1/2 cup of casserole  6 chicken nuggets  2-inch-square brownie or cake without frosting  2 small cookies  1/2 cup of ice cream or sherbet    Carb counting is easier when food labels are available. Look at the label to see how many grams of total carbohydrates the food contains. Then you can figure out how much you should eat.  Two very important lines to look at on the label are the serving size and the total carbohydrate amount. Here are some tips for using food labels to count your carbohydrate intake:  Check the serving size. The information on the label is based on that serving size. If you eat more than the listed serving size, you may have to double or triple the other information on the label.   Check the total grams of carbohydrates. Total carbohydrate from the label includes sugar, starch, and fiber. Be sure to use the total carbohydrate number and not sugar alone.  Know how many grams of carbohydrates you can have.  Be familiar with the matching portion sizes.  Compare labels. Compare the labels of different products, looking at serving sizes and total carbohydrates to find the products that work best for you.   Don't forget protein and fat. With all the focus on carb counting, it might be easy to forget protein and fat in your meals. Don't forget to include sources of protein and healthy fat to balance your meals.  Its also important to be consistent with the amount and time you eat when taking a fixed dose of diabetes medicine. Work with your healthcare provider or dietitian if you need additional help. He or she can help you keep track of your carbohydrate intake. He or she can also help you figure out how many grams of carbohydrates you should  have.      ---------------------------------------------------------------------------------------------------------------------------------------------------    Diabetes: Learning About Serving and Portion Sizes     A good rule of thumb: Devote half your plate to vegetables and green salad. Split the other half between protein and starchy carbohydrates. Fruit makes a good dessert.     Servings and portions. Whats the difference? These terms can be very confusing. But learning to measure serving sizes can help you figure out how many carbohydrates (carbs) and other foods you eat each day. They are also powerful tools for managing your weight.    Servings and portions  Many different words are used to describe amounts of food. If your health care provider uses a term youre not sure of, dont be afraid to ask. It helps to know the difference between servings and portions:  A serving size is a fixed size. Food producers use this term to describe their products. For example, the label on a cereal box could say that 1 cup of dry cereal = 1 serving.  A portion (also called a helping) is how much you eat or how much you put on your plate at a meal. For example, you might eat 2 cups of cereal at breakfast.    Using serving information  The portion you choose to eat (such as 2 cups of cereal) may be more than one serving as listed on the food label (such as 1 cup of cereal). Thats why it helps to measure or weigh the food you eat. Because the food label values are based on servings, youll need to know how many servings you eat at one sitting.     Ounces: 2 to 3 ounces is about the size of your palm.       1 Cup: 1 cup (or a medium-sized piece) is about the size of your fist.       1/2 Cup: 1/2 cup is about the size of your cupped hand.      One tablespoon is about the size of your thumb.  One teaspoon is about the size of the tip of your thumb.    Keeping track of serving sizes  When youre planning for a snack or a  "meal, keep servings in mind. If you dont have measuring cups or a scale handy, there are ways to eyeball serving sizes, such as comparing your food to the size of your hand (see pictures above).    Managing portion sizes  If your weight is a concern, reducing your portions can help. You can eat more than one serving of a food at once. But to keep from eating too much at one meal, learn how to manage your portions. A portion is the amount of each type of food on your plate. See the plate diagram for an example of balanced portions.    ---------------------------------------------------------------------------------------------------------------------------------------------------    Diabetes: Shopping for and Preparing Meals    Having diabetes doesnt mean you have to shop in a special aisle or look for special foods. But you will need to make choices. By comparing items and reading food labels, you can find the healthiest foods for you and your family.  Comparing items  When you shop, compare items to find the best ones for your needs. Keep these facts in mind:  No sugar added does not mean a product is sugar-free.  "Sugar-free" means less than 1/2 gram (g) of sugar per serving.  Fat free means less than 1/2 g of fat per serving. This does not necessarily mean the product is low in calories.  Low fat means 3 g fat or less per serving. Reduced fat or less fat means 25% less fat than the regular version. Some of this fat may be saturated or trans fat. And calories per serving may be similar to the regular version.    Making small changes  Dont try to change all of your eating habits at once. Here are some ideas to start with:  Try fat-free or low-fat cheese, milk, and yogurt. Also try leaner cuts of meat. This will help you cut down on saturated fat.  Try whole-grain breads, brown rice, and whole-wheat pasta.  Load up on fresh or frozen vegetables. If you buy canned, choose low-sodium vegetables.  Avoid " processed foods as much as possible. They tend to be low in fiber and high in trans fats and sodium.  Try tofu, soymilk, or meat substitutes.?They can help you cut cholesterol and saturated fat out of your diet.    Learning to read food labels  To find healthy foods that help you control blood sugar, learn how to read food labels. Look for the Nutrition Facts label on packaged foods. It will tell you how much carbohydrate, sugar, fat, and fiber is in each serving. Then, you can decide whether or not the food fits into your meal plan.    Using the food label  So, once you have the food label, what do you do with it? The food label helps in many ways. Use it to:  Compare items and decide which is the best for your health needs.  Track the number of carbohydrates in your portions.  Figure out how many servings of a food you can have and still stay within the number of carbohydrates for that meal.    Planning meals  For good blood sugar control, plan what and when youll eat. Start by creating a meal plan that includes all the food groups. Then, time your meals to help keep your blood sugar level steady. You may need to adjust your plan for special situations.    Eat from all the food groups  The basis of a healthy meal plan is variety (eating many different types of foods). Look for lean meats, fresh fruits and vegetables, whole grains, and low-fat or non-fat dairy products. Eating a wide variety of foods provides the nutrients your body needs. It can also keep you from getting bored with your meal plan.    Reduce liquid sugars  Extra calories from sodas, sports drinks, and fruit drinks make it hard to keep blood sugar in range. Cut as many liquid sugars from your meal plan as you can. This includes most fruit juices, which are often high in natural or added sugar. Instead, drink plenty of water and other sugar-free beverages.    Eat less fat  If you need to lose some weight, try to reduce the amount of fat in your  diet. This can also help lower your cholesterol level to keep blood vessels healthier. Cut fat by using only small amounts of liquid oil for cooking. Read food labels carefully to avoid foods with unhealthy trans fats.    Timing your meals  When it comes to blood sugar control, when you eat is as important as what you eat. You may need to eat several small meals spaced evenly throughout the day to stay in your target range. So dont skip breakfast or wait until late in the day to get most of your calories. Doing so can cause your blood sugar to rise too high or fall too low.    Cooking wisely  Broil, steam, bake, or grill meats and vegetables, instead of frying.  Instead of cream-based sauces or sugary glazes, flavor foods with vegetable purée, lemon or lime juice, or herb seasonings.  Remove skin from chicken and turkey before serving.  Look in cookbooks for easy, low-fat, low-sugar recipes. When making your usual recipes, cut sugar by 1/2 and fat by 1/3.      ---------------------------------------------------------------------------------------------------------------------------------------------------    Healthy Meals for Diabetes    Ask your healthcare team to help you make a meal plan that fits your needs. Your meal plan tells you when to eat your meals and snacks, what kinds of foods to eat, and how much of each food to eat. You dont have to give up all the foods you like. But you do need to follow some guidelines.  Choose healthy carbohydrates  Starches, sugars, and fiber are all types of carbohydrates. Fiber can help lower your cholesterol and triglycerides. Fiber is also healthy for your heart. You should have 20 to 35 grams of total fiber each day. Fiber-rich foods include:  Whole-grain breads and cereals  Bulgur wheat  Brown rice     Whole-wheat pasta  Fruits and vegetables  Dry beans, and peas   Keep track of the amount of carbohydrates you eat. This can help you keep the right balance of physical  activity and medicine. The amount of carbohydrates needed will vary for each person. It depends on many things such as your health, the medicines you take, and how active you are. Your healthcare team will help you figure out the right amount of carbohydrates for you. You may start with around 45 to 60 grams of carbohydrates per meal, depending on your situation.   Here are some examples of foods containing about 15 grams of carbohydrates (1 serving of carbohydrates):  1/2 cup of canned or frozen fruit  A small piece of fresh fruit (4 ounces)  1 slice of bread  1/2 cup of oatmeal  1/3 cup of rice  4 to 6 crackers  1/2 English muffin  1/2 cup of black beans  1/4 of a large baked potato (3 ounces)  2/3 cup of plain fat-free yogurt  1 cup of soup  1/2 cup of casserole  6 chicken nuggets  2-inch-square brownie or cake without frosting  2 small cookies  1/2 cup of ice cream or sherbet    Choose healthy protein foods  Eating protein that is low in fat can help you control your weight. It also helps keep your heart healthy. Low-fat protein foods include:  Fish  Plant proteins, such as dry beans and peas, nuts, and soy products like tofu and soymilk  Lean meat with all visible fat removed  Poultry with the skin removed  Low-fat or nonfat milk, cheese, and yogurt    Limit unhealthy fats and sugar  Saturated and trans fats are unhealthy for your heart. They raise LDL (bad) cholesterol. Fat is also high in calories, so it can make you gain weight. To cut down on unhealthy fats and sugar, limit these foods:  Butter or margarine  Palm and palm kernel oils and coconut oil  Cream  Cheese  Pearson  Lunch meats     Ice cream  Sweet bakery goods such as pies, muffins, and donuts  Jams and jellies  Candy bars  Regular sodas     How much to eat  The amount of food you eat affects your blood sugar. It also affects your weight. Your healthcare team will tell you how much of each type of food you should eat.  Use measuring cups and spoons  and a food scale to measure serving sizes.  Learn what a correct serving size looks like on your plate. This will help when you are away from home and cant measure your servings.  Eat only the number of servings given on your meal plan for each food. Dont take seconds.  Learn to read food labels. Be sure to look at serving size, total carbohydrates, fiber, calories, sugar, and saturated and trans fats. Look for healthier alternatives to foods that have added sugar.  Plan ahead for parties so you can still have a good time without going overboard with unhealthy food choices. Set a good example yourself by bringing a healthy dish to pot lucks.     Choose healthy snacks  When it comes to snacks, we usually think about foods with added sugar and fats. But there are many other options for healthier snack choices. Here are a few snack ideas to choose from:  Snacks with less than 5 grams of carbohydrates  1 piece of string cheese  3 celery sticks plus 1 tablespoon of peanut butter  5 cherry tomatoes plus 1 tablespoon of ranch dressing  1 hard-boiled egg  1/4 cup of fresh blueberries   5 baby carrots  1 cup of light popcorn  1/2 cup of sugar-free gelatin  15 almonds  Snacks with about 10 to 20 grams of carbohydrates  1/3 cup of hummus plus 1 cup of fresh cut nonstarchy vegetables (carrots, green peppers, broccoli, celery, or a combination)  1/2 cup of fresh or canned fruit plus 1/4 cup of cottage cheese  1/2 cup of tuna salad with 4 crackers  2 rice cakes and a tablespoon of peanut butter  1 small apple or orange  3 cups light popcorn  1/2 of a turkey sandwich (1 slice of whole-wheat bread, 2 ounces of turkey, and mustard)  Portion sizes are important to controlling your blood sugar and staying at a healthy weight. Stock up on healthy snack items so you always have them on hand.    When to eat  Your meal plan will likely include breakfast, lunch, dinner, and some snacks.  Try to eat your meals and snacks at about the same  times each day.  Eat all your meals and snacks. Skipping a meal or snack can make your blood sugar drop too low. It can also cause you to eat too much at the next meal or snack. Then your blood sugar could get too high.    ---------------------------------------------------------------------------------------------------------------------------------------------------    Eating Out When You Have Diabetes  Eating right is an important part of keeping your blood sugar in your target range. You just need to make healthy choices.    Be creative when eating out. Many places dont make you stick strictly to the menu. Instead of ordering a large entree, choose an appetizer or two and a bowl of soup. A mix of side orders can also make a good meal. Read the descriptions of other entrees and specials. If another entree comes with baby carrots, ask for a side order of them even if they dont come with your entree. Ask how food is prepared or if it can be made differently.  Tips for making the most of your meal out  Ask to have high-fat, high-calorie extras, such as French fries and potato chips, left off your plate so you wont be tempted.   Ask what substitutions are available. Instead of French fries, you may be able to have a side of salad with low-calorie dressing.  Order low-fat milk instead of cream for your coffee.  Ask for vegetables and main courses to be served without sauces, butter, margarine, or oil.  Be aware of portion size. You dont have to clean your plate. Take half your meal home in a doggie bag to eat the next day.  Look for heart-healthy or low-fat entrees that include whole grains, vegetables, or fruits.  Choose foods that are grilled, broiled, or steamed.  Avoid dishes that are described on the menu as fried, breaded, smothered, rich, or creamy.    Tips for restaurant meals  When you eat away from home try these tips:  Try to schedule your dining-out meal at your normal meal time. Make a reservation if  possible, so you don't have to wait to eat. If you can't make a reservation, try to arrive at the restaurant at a less-busy time to cut down your wait time. Eat a small fruit or starch snack at your regular mealtime if your restaurant meal is going to be later than usual.   Call ahead to see if the restaurant can meet your dietary needs if you've never been there before. Or you can go online to see the menu ahead of time.  Carry some crackers with you in case the restaurant needs you to wait until you can be served.  Ask how foods are prepared before you order.  Instead of fried, sautéed, or breaded foods, choose ones that are broiled, steamed, grilled, or baked.  Ask for sauces, gravies, and dressings on the side.  Only eat an amount that fits your meal plan. Remember: You can take home the leftovers.  Save dessert for special occasions. Then choose a small dessert or share one with a friend or family member.    Make healthy choices  Fast food  Garden salad with light dressing on the side  Baked potato with vegetables or herbs  Broiled, roasted, or grilled chicken sandwich  Sliced turkey or lean roast beef sandwich    Mexican  Chicken enchilada, without cheese or sour cream   Small burrito with whole beans and chicken  Whole beans (not refried) and rice  Chicken or fish fajitas    Steakhouse  Grilled or broiled lean cuts of beef  Baked potato with vegetables or herbs  Broiled or baked chicken. Dont eat the skin.  Steamed vegetables    Asian  Steamed dumplings or potstickers  Broiled, boiled, or steamed meats or fish  Sushi or sashimi  Steamed rice or boiled noodles. One serving is equal to 1/3 cup.      © 5896-6652 [employer]. [address] [place] [address], [place] [address]. All rights reserved. This information is not intended as a substitute for professional medical care. Always follow your healthcare professional's instructions.

## 2023-08-22 NOTE — ASSESSMENT & PLAN NOTE
Benign biopsies of isthmus and left nodules in 2019  Sounds like he had cyst drained on right in 2008 -not seen on last US  Denies compressive symptoms  Reviewed US from 2023-recommend re-biopsy of left sided 4.1 cm nodule  He agrees to FNA   Recheck US in one year    Declines thyroid surgery discussion at this time

## 2023-09-01 ENCOUNTER — TELEPHONE (OUTPATIENT)
Dept: ENDOSCOPY | Facility: HOSPITAL | Age: 48
End: 2023-09-01

## 2023-09-01 ENCOUNTER — PATIENT MESSAGE (OUTPATIENT)
Dept: ENDOSCOPY | Facility: HOSPITAL | Age: 48
End: 2023-09-01

## 2023-09-01 ENCOUNTER — CLINICAL SUPPORT (OUTPATIENT)
Dept: ENDOSCOPY | Facility: HOSPITAL | Age: 48
End: 2023-09-01
Attending: INTERNAL MEDICINE
Payer: COMMERCIAL

## 2023-09-01 DIAGNOSIS — Z12.11 COLON CANCER SCREENING: ICD-10-CM

## 2023-09-01 DIAGNOSIS — Z12.11 SPECIAL SCREENING FOR MALIGNANT NEOPLASMS, COLON: Primary | ICD-10-CM

## 2023-09-01 RX ORDER — SODIUM, POTASSIUM,MAG SULFATES 17.5-3.13G
1 SOLUTION, RECONSTITUTED, ORAL ORAL DAILY
Qty: 1 KIT | Refills: 0 | Status: SHIPPED | OUTPATIENT
Start: 2023-09-01 | End: 2023-09-03

## 2023-09-01 NOTE — TELEPHONE ENCOUNTER
Spoke to patient to schedule procedure(s) Colonoscopy       Physician to perform procedure(s) Dr. MOOKIE Vieira  Date of Procedure (s) 12/5/23  Arrival Time 9:00 AM  Time of Procedure(s) 10:00 AM   Location of Procedure(s) [facility] 4th Floor  Type of Rx Prep sent to patient: Suprep  Instructions provided to patient via [facility]    Patient was informed on the following information and verbalized understanding. Screening questionnaire reviewed with patient and complete. If procedure requires anesthesia, a responsible adult needs to be present to accompany the patient home, patient cannot drive after receiving anesthesia. Appointment details are tentative, especially check-in time. Patient will receive a prep-op call 4 days prior to confirm check-in time for procedure. If applicable the patient should contact their pharmacy to verify Rx for procedure prep is ready for pick-up. Patient was advised to call the scheduling department at [phone] if pharmacy states no Rx is available. Patient was advised to call the endoscopy scheduling department if any questions or concerns arise.      [facility] Scheduling Department

## 2023-10-18 ENCOUNTER — IMMUNIZATION (OUTPATIENT)
Dept: INTERNAL MEDICINE | Facility: CLINIC | Age: 48
End: 2023-10-18
Payer: COMMERCIAL

## 2023-10-18 PROCEDURE — 90686 IIV4 VACC NO PRSV 0.5 ML IM: CPT | Mod: S$GLB,,, | Performed by: INTERNAL MEDICINE

## 2023-10-18 PROCEDURE — 90471 FLU VACCINE (QUAD) GREATER THAN OR EQUAL TO 3YO PRESERVATIVE FREE IM: ICD-10-PCS | Mod: S$GLB,,, | Performed by: INTERNAL MEDICINE

## 2023-10-18 PROCEDURE — 90686 FLU VACCINE (QUAD) GREATER THAN OR EQUAL TO 3YO PRESERVATIVE FREE IM: ICD-10-PCS | Mod: S$GLB,,, | Performed by: INTERNAL MEDICINE

## 2023-10-18 PROCEDURE — 90471 IMMUNIZATION ADMIN: CPT | Mod: S$GLB,,, | Performed by: INTERNAL MEDICINE

## 2023-11-08 ENCOUNTER — OFFICE VISIT (OUTPATIENT)
Dept: INTERNAL MEDICINE | Facility: CLINIC | Age: 48
End: 2023-11-08
Payer: COMMERCIAL

## 2023-11-08 VITALS
DIASTOLIC BLOOD PRESSURE: 84 MMHG | OXYGEN SATURATION: 99 % | WEIGHT: 214.06 LBS | HEART RATE: 89 BPM | BODY MASS INDEX: 33.6 KG/M2 | HEIGHT: 67 IN | SYSTOLIC BLOOD PRESSURE: 130 MMHG

## 2023-11-08 DIAGNOSIS — E78.00 PURE HYPERCHOLESTEROLEMIA: ICD-10-CM

## 2023-11-08 DIAGNOSIS — I10 ESSENTIAL HYPERTENSION: ICD-10-CM

## 2023-11-08 DIAGNOSIS — E04.2 MULTINODULAR GOITER (NONTOXIC): ICD-10-CM

## 2023-11-08 DIAGNOSIS — Z09 FOLLOW-UP EXAM: Primary | ICD-10-CM

## 2023-11-08 DIAGNOSIS — R73.03 PREDIABETES: ICD-10-CM

## 2023-11-08 DIAGNOSIS — G56.03 BILATERAL CARPAL TUNNEL SYNDROME: ICD-10-CM

## 2023-11-08 PROCEDURE — 1160F RVW MEDS BY RX/DR IN RCRD: CPT | Mod: CPTII,S$GLB,, | Performed by: INTERNAL MEDICINE

## 2023-11-08 PROCEDURE — 3008F PR BODY MASS INDEX (BMI) DOCUMENTED: ICD-10-PCS | Mod: CPTII,S$GLB,, | Performed by: INTERNAL MEDICINE

## 2023-11-08 PROCEDURE — 4010F PR ACE/ARB THEARPY RXD/TAKEN: ICD-10-PCS | Mod: CPTII,S$GLB,, | Performed by: INTERNAL MEDICINE

## 2023-11-08 PROCEDURE — 99999 PR PBB SHADOW E&M-EST. PATIENT-LVL III: ICD-10-PCS | Mod: PBBFAC,,, | Performed by: INTERNAL MEDICINE

## 2023-11-08 PROCEDURE — 3075F PR MOST RECENT SYSTOLIC BLOOD PRESS GE 130-139MM HG: ICD-10-PCS | Mod: CPTII,S$GLB,, | Performed by: INTERNAL MEDICINE

## 2023-11-08 PROCEDURE — 3044F PR MOST RECENT HEMOGLOBIN A1C LEVEL <7.0%: ICD-10-PCS | Mod: CPTII,S$GLB,, | Performed by: INTERNAL MEDICINE

## 2023-11-08 PROCEDURE — 3079F PR MOST RECENT DIASTOLIC BLOOD PRESSURE 80-89 MM HG: ICD-10-PCS | Mod: CPTII,S$GLB,, | Performed by: INTERNAL MEDICINE

## 2023-11-08 PROCEDURE — 99214 PR OFFICE/OUTPT VISIT, EST, LEVL IV, 30-39 MIN: ICD-10-PCS | Mod: S$GLB,,, | Performed by: INTERNAL MEDICINE

## 2023-11-08 PROCEDURE — 99999 PR PBB SHADOW E&M-EST. PATIENT-LVL III: CPT | Mod: PBBFAC,,, | Performed by: INTERNAL MEDICINE

## 2023-11-08 PROCEDURE — 3044F HG A1C LEVEL LT 7.0%: CPT | Mod: CPTII,S$GLB,, | Performed by: INTERNAL MEDICINE

## 2023-11-08 PROCEDURE — 4010F ACE/ARB THERAPY RXD/TAKEN: CPT | Mod: CPTII,S$GLB,, | Performed by: INTERNAL MEDICINE

## 2023-11-08 PROCEDURE — 1159F MED LIST DOCD IN RCRD: CPT | Mod: CPTII,S$GLB,, | Performed by: INTERNAL MEDICINE

## 2023-11-08 PROCEDURE — 3079F DIAST BP 80-89 MM HG: CPT | Mod: CPTII,S$GLB,, | Performed by: INTERNAL MEDICINE

## 2023-11-08 PROCEDURE — 99214 OFFICE O/P EST MOD 30 MIN: CPT | Mod: S$GLB,,, | Performed by: INTERNAL MEDICINE

## 2023-11-08 PROCEDURE — 1160F PR REVIEW ALL MEDS BY PRESCRIBER/CLIN PHARMACIST DOCUMENTED: ICD-10-PCS | Mod: CPTII,S$GLB,, | Performed by: INTERNAL MEDICINE

## 2023-11-08 PROCEDURE — 1159F PR MEDICATION LIST DOCUMENTED IN MEDICAL RECORD: ICD-10-PCS | Mod: CPTII,S$GLB,, | Performed by: INTERNAL MEDICINE

## 2023-11-08 PROCEDURE — 3008F BODY MASS INDEX DOCD: CPT | Mod: CPTII,S$GLB,, | Performed by: INTERNAL MEDICINE

## 2023-11-08 PROCEDURE — 3075F SYST BP GE 130 - 139MM HG: CPT | Mod: CPTII,S$GLB,, | Performed by: INTERNAL MEDICINE

## 2023-11-08 NOTE — PATIENT INSTRUCTIONS
Schedule fasting labwork  Covid-19 booster today at immunization center.     Return to clinic in 6 months or sooner if needed.

## 2023-11-08 NOTE — PROGRESS NOTES
Subjective:       Patient ID: Talon Leblanc Jr. is a 48 y.o. male.    Chief Complaint: Follow-up and Medication Refill      HPI  Talon Leblanc Jr. is a 48 y.o. year old male established patient presents for follow up. Doing well. Is scheduled for cscope and FNA of thyroid nodule.     Review of Systems   Constitutional:  Negative for activity change, appetite change, chills, fatigue, fever and unexpected weight change.   HENT:  Negative for congestion, rhinorrhea and sore throat.    Eyes:  Negative for visual disturbance.   Respiratory:  Negative for shortness of breath.    Cardiovascular:  Negative for chest pain.   Gastrointestinal:  Negative for abdominal pain, diarrhea, nausea and vomiting.   Genitourinary:  Negative for difficulty urinating and dysuria.   Musculoskeletal:  Negative for arthralgias, back pain and myalgias.   Skin:  Negative for color change and rash.   Neurological:  Negative for dizziness, weakness and headaches.         Past Medical History:   Diagnosis Date    Glaucoma     Hypertension     Multiple thyroid nodules     Prediabetes         Prior to Admission medications    Medication Sig Start Date End Date Taking? Authorizing Provider   latanoprost 0.005 % ophthalmic solution Inject 2 drops into the eye Daily.   Yes Provider, Chirag   losartan (COZAAR) 25 MG tablet Take 1 tablet (25 mg total) by mouth once daily. 4/28/23  Yes Giancarlo Covarrubias MD   rosuvastatin (CRESTOR) 20 MG tablet Take 1 tablet (20 mg total) by mouth once daily. 8/22/23 8/21/24 Yes Siomara Azar NP   diphenhydrAMINE-aluminum-magnesium hydroxide-simethicone-LIDOcaine HCl 2% Swish and spit 10 mLs every 6 (six) hours as needed (oral pain).  Patient not taking: Reported on 11/8/2023 7/3/23   Galileo Brizuela MD   LIDOcaine HCl 2% (LIDOCAINE VISCOUS) 2 % Soln by Mucous Membrane route 4 (four) times daily as needed (Tongue pain). Apply with a Q-tip to tongue sore before eating  Patient not taking:  "Reported on 11/8/2023 7/3/23   Galileo Brizuela MD        Past medical history, surgical history, and family medical history reviewed and updated as appropriate.    Medications and allergies reviewed.     Objective:          Vitals:    11/08/23 0823   BP: 130/84   BP Location: Right arm   Patient Position: Sitting   Pulse: 89   SpO2: 99%   Weight: 97.1 kg (214 lb 1.1 oz)   Height: 5' 7" (1.702 m)     Body mass index is 33.53 kg/m².  Physical Exam  Constitutional:       General: He is not in acute distress.     Appearance: He is well-developed.   HENT:      Head: Normocephalic and atraumatic.      Nose: Nose normal.   Eyes:      General: No scleral icterus.     Extraocular Movements: Extraocular movements intact.   Neck:      Thyroid: No thyromegaly.      Vascular: No JVD.      Trachea: No tracheal deviation.   Cardiovascular:      Rate and Rhythm: Normal rate and regular rhythm.      Heart sounds: Normal heart sounds. No murmur heard.     No friction rub. No gallop.   Pulmonary:      Effort: Pulmonary effort is normal. No respiratory distress.      Breath sounds: Normal breath sounds. No wheezing or rales.   Abdominal:      General: Bowel sounds are normal. There is no distension.      Palpations: Abdomen is soft. There is no mass.      Tenderness: There is no abdominal tenderness.   Musculoskeletal:         General: No tenderness. Normal range of motion.      Cervical back: Normal range of motion and neck supple.   Lymphadenopathy:      Cervical: No cervical adenopathy.   Skin:     General: Skin is warm and dry.      Findings: No rash.   Neurological:      Mental Status: He is alert and oriented to person, place, and time.      Cranial Nerves: No cranial nerve deficit.      Deep Tendon Reflexes: Reflexes normal.   Psychiatric:         Behavior: Behavior normal.         Lab Results   Component Value Date    WBC 8.41 04/28/2023    HGB 14.9 04/28/2023    HCT 46.7 04/28/2023     04/28/2023    CHOL 243 (H) " 04/28/2023    TRIG 194 (H) 04/28/2023    HDL 40 04/28/2023    ALT 29 04/28/2023    AST 21 04/28/2023     04/28/2023    K 4.7 04/28/2023     04/28/2023    CREATININE 1.1 04/28/2023    BUN 19 04/28/2023    CO2 23 04/28/2023    TSH 0.660 04/28/2023    HGBA1C 6.2 (H) 04/28/2023       Assessment:       1. Follow-up exam    2. Essential hypertension    3. Prediabetes    4. Bilateral carpal tunnel syndrome    5. Pure hypercholesterolemia    6. Multinodular goiter (nontoxic)          Plan:     Talon was seen today for follow-up and medication refill.    Diagnoses and all orders for this visit:    Follow-up exam    Essential hypertension  Comments:  controlled, no changes to current management    Prediabetes  Comments:  recheck a1c  Orders:  -     HEMOGLOBIN A1C; Future    Bilateral carpal tunnel syndrome  Comments:  s/p bilateral carpal surgery, doing well.    Pure hypercholesterolemia  Comments:  restarted crestor. repeat lipid panel  Orders:  -     Lipid Panel; Future    Multinodular goiter (nontoxic)  Comments:  thyroid nodule for repeat biopsy, scheduled with endocrinology    Benign physical examination, no issues identified. Will obtain routine labwork and age appropriate health screenings.     Health maintenance reviewed with patient. Due for covid-19 booster.     Follow up in about 6 months (around 5/8/2024).    Giancarlo Covarrubias MD  Internal Medicine / Primary Care  [facility]  11/8/2023

## 2023-11-28 ENCOUNTER — TELEPHONE (OUTPATIENT)
Dept: ENDOSCOPY | Facility: HOSPITAL | Age: 48
End: 2023-11-28
Payer: COMMERCIAL

## 2023-12-04 ENCOUNTER — TELEPHONE (OUTPATIENT)
Dept: ENDOSCOPY | Facility: HOSPITAL | Age: 48
End: 2023-12-04
Payer: COMMERCIAL

## 2023-12-04 NOTE — TELEPHONE ENCOUNTER
Incoming call  Spoke to patient  Reason for the call: Spoke with patient Review prep instruction confirm date and time of procedure   Patient verbalized understanding    Information confirmed:   Date of procedure:  12/5/2023  Arrival time: 9:00 am  Procedure (s): colonoscopy  Prep: patient picked up prep  Ride: patient has a family member to bring him to his schedule procedure.

## 2023-12-05 ENCOUNTER — ANESTHESIA EVENT (OUTPATIENT)
Dept: ENDOSCOPY | Facility: HOSPITAL | Age: 48
End: 2023-12-05
Payer: COMMERCIAL

## 2023-12-05 ENCOUNTER — HOSPITAL ENCOUNTER (OUTPATIENT)
Facility: HOSPITAL | Age: 48
Discharge: HOME | End: 2023-12-05
Attending: STUDENT IN AN ORGANIZED HEALTH CARE EDUCATION/TRAINING PROGRAM | Admitting: STUDENT IN AN ORGANIZED HEALTH CARE EDUCATION/TRAINING PROGRAM
Payer: COMMERCIAL

## 2023-12-05 ENCOUNTER — ANESTHESIA (OUTPATIENT)
Dept: ENDOSCOPY | Facility: HOSPITAL | Age: 48
End: 2023-12-05
Payer: COMMERCIAL

## 2023-12-05 VITALS
BODY MASS INDEX: 33.74 KG/M2 | WEIGHT: 215 LBS | HEIGHT: 67 IN | TEMPERATURE: 98 F | DIASTOLIC BLOOD PRESSURE: 84 MMHG | SYSTOLIC BLOOD PRESSURE: 126 MMHG | HEART RATE: 64 BPM | OXYGEN SATURATION: 100 % | RESPIRATION RATE: 16 BRPM

## 2023-12-05 DIAGNOSIS — Z12.11 ENCOUNTER FOR SCREENING COLONOSCOPY: ICD-10-CM

## 2023-12-05 DIAGNOSIS — Z12.11 SCREENING FOR MALIGNANT NEOPLASM OF COLON: Primary | ICD-10-CM

## 2023-12-05 PROCEDURE — E9220 PRA ENDO ANESTHESIA: HCPCS | Mod: 33,,, | Performed by: REGISTERED NURSE

## 2023-12-05 PROCEDURE — 88305 TISSUE EXAM BY PATHOLOGIST: ICD-10-PCS | Mod: 26,,, | Performed by: PATHOLOGY

## 2023-12-05 PROCEDURE — 63600175 PHARM REV CODE 636 W HCPCS: Performed by: REGISTERED NURSE

## 2023-12-05 PROCEDURE — 45385 PR COLONOSCOPY,REMV LESN,SNARE: ICD-10-PCS | Mod: 33,,, | Performed by: STUDENT IN AN ORGANIZED HEALTH CARE EDUCATION/TRAINING PROGRAM

## 2023-12-05 PROCEDURE — 45385 COLONOSCOPY W/LESION REMOVAL: CPT | Mod: PT | Performed by: STUDENT IN AN ORGANIZED HEALTH CARE EDUCATION/TRAINING PROGRAM

## 2023-12-05 PROCEDURE — 88305 TISSUE EXAM BY PATHOLOGIST: CPT | Performed by: PATHOLOGY

## 2023-12-05 PROCEDURE — 25000003 PHARM REV CODE 250: Performed by: REGISTERED NURSE

## 2023-12-05 PROCEDURE — 88305 TISSUE EXAM BY PATHOLOGIST: CPT | Mod: 26,,, | Performed by: PATHOLOGY

## 2023-12-05 PROCEDURE — E9220 PRA ENDO ANESTHESIA: ICD-10-PCS | Mod: 33,,, | Performed by: REGISTERED NURSE

## 2023-12-05 PROCEDURE — 27201012 HC FORCEPS, HOT/COLD, DISP: Performed by: STUDENT IN AN ORGANIZED HEALTH CARE EDUCATION/TRAINING PROGRAM

## 2023-12-05 PROCEDURE — 37000008 HC ANESTHESIA 1ST 15 MINUTES: Performed by: STUDENT IN AN ORGANIZED HEALTH CARE EDUCATION/TRAINING PROGRAM

## 2023-12-05 PROCEDURE — 37000009 HC ANESTHESIA EA ADD 15 MINS: Performed by: STUDENT IN AN ORGANIZED HEALTH CARE EDUCATION/TRAINING PROGRAM

## 2023-12-05 PROCEDURE — 25000003 PHARM REV CODE 250: Performed by: STUDENT IN AN ORGANIZED HEALTH CARE EDUCATION/TRAINING PROGRAM

## 2023-12-05 PROCEDURE — 45385 COLONOSCOPY W/LESION REMOVAL: CPT | Mod: 33,,, | Performed by: STUDENT IN AN ORGANIZED HEALTH CARE EDUCATION/TRAINING PROGRAM

## 2023-12-05 RX ORDER — LIDOCAINE HYDROCHLORIDE 20 MG/ML
INJECTION INTRAVENOUS
Status: DISCONTINUED | OUTPATIENT
Start: 2023-12-05 | End: 2023-12-05

## 2023-12-05 RX ORDER — PROPOFOL 10 MG/ML
VIAL (ML) INTRAVENOUS
Status: DISCONTINUED | OUTPATIENT
Start: 2023-12-05 | End: 2023-12-05

## 2023-12-05 RX ORDER — PROPOFOL 10 MG/ML
INJECTION, EMULSION INTRAVENOUS CONTINUOUS PRN
Status: DISCONTINUED | OUTPATIENT
Start: 2023-12-05 | End: 2023-12-05

## 2023-12-05 RX ORDER — SODIUM CHLORIDE 9 MG/ML
INJECTION, SOLUTION INTRAVENOUS CONTINUOUS
Status: DISCONTINUED | OUTPATIENT
Start: 2023-12-05 | End: 2023-12-05 | Stop reason: HOSPADM

## 2023-12-05 RX ADMIN — LIDOCAINE HYDROCHLORIDE 50 MG: 20 INJECTION INTRAVENOUS at 10:12

## 2023-12-05 RX ADMIN — SODIUM CHLORIDE: 0.9 INJECTION, SOLUTION INTRAVENOUS at 10:12

## 2023-12-05 RX ADMIN — PROPOFOL 175 MCG/KG/MIN: 10 INJECTION, EMULSION INTRAVENOUS at 10:12

## 2023-12-05 RX ADMIN — PROPOFOL 80 MG: 10 INJECTION, EMULSION INTRAVENOUS at 10:12

## 2023-12-05 NOTE — TRANSFER OF CARE
"Anesthesia Transfer of Care Note    Patient: Talon Leblanc Jr.    Procedure(s) Performed: Procedure(s) (LRB):  COLONOSCOPY (N/A)    Patient location: GI    Anesthesia Type: general    Transport from OR: Transported from OR on room air with adequate spontaneous ventilation    Post pain: adequate analgesia    Post assessment: no apparent anesthetic complications and tolerated procedure well    Post vital signs: stable    Level of consciousness: sedated    Nausea/Vomiting: no nausea/vomiting    Complications: none    Transfer of care protocol was followedComments: Nurse at bedside, VSS, spont reg resp noted    Last vitals: Visit Vitals  /66 (BP Location: Left arm)   Pulse 76   Temp 36.5 °C (97.7 °F) (Temporal)   Resp 16   Ht 5' 7" (1.702 m)   Wt 97.5 kg (215 lb)   SpO2 97%   BMI 33.67 kg/m²     "

## 2023-12-05 NOTE — ANESTHESIA PREPROCEDURE EVALUATION
12/05/2023  Talon Leblanc Jr. is a 48 y.o., male.  [facility]  Anesthesia Pre-Operative Evaluation       Patient Name: Talon Leblanc Jr.  YOB: 1975  MRN: [id number]  CSN: [id number]      Code Status: Prior   Date of Procedure: 12/5/2023  Anesthesia: Choice Procedure: Procedure(s) (LRB):  COLONOSCOPY (N/A)  Pre-Operative Diagnosis: Colon cancer screening [Z12.11]  Proceduralist: Surgeon(s) and Role:     * Americo Vieira MD - Primary        SUBJECTIVE:   Talon Leblanc Jr. is a 48 y.o. male who  has a past medical history of Glaucoma, Hypertension, Multiple thyroid nodules, and Prediabetes. No notes on file    Anticoagulants   Medication Route Frequency       he has a current medication list which includes the following long-term medication(s): losartan and rosuvastatin.   ALLERGIES:   Review of patient's allergies indicates:  No Known Allergies  LDA:          Lines/Drains/Airways       None                 MEDICATIONS:     Antibiotics (From admission, onward)      None          VTE Risk Mitigation (From admission, onward)      None          Current Facility-Administered Medications   Medication Dose Route Frequency Provider Last Rate Last Admin    0.9%  NaCl infusion   Intravenous Continuous Americo Vieira MD         Facility-Administered Medications Ordered in Other Encounters   Medication Dose Route Frequency Provider Last Rate Last Admin    0.9%  NaCl infusion   Intravenous Continuous Serafin Avina MD 10 mL/hr at 08/19/22 0830 New Bag at 08/19/22 0830    LIDOcaine (PF) 10 mg/ml (1%) injection 10 mg  1 mL Intradermal Once Serafin Avina MD        mupirocin 2 % ointment   Nasal On Call Procedure Sharon Tirado MD   Given at 08/19/22 0830          History:   There are no hospital problems to display for this  patient.    Surgical History:    has a past surgical history that includes Fracture surgery; r femur fx w/ tatyana (Right, 2017); Carpal tunnel release (Right, 5/18/2022); and Carpal tunnel release (Left, 8/19/2022).   Social History:    reports that he is not currently sexually active.  reports that he has never smoked. He has never used smokeless tobacco. He reports that he does not drink alcohol and does not use drugs.     OBJECTIVE:     Vital Signs (Most Recent):    Vital Signs Range (Last 24H):          There is no height or weight on file to calculate BMI.   Wt Readings from Last 4 Encounters:   11/08/23 97.1 kg (214 lb 1.1 oz)   08/22/23 97.6 kg (215 lb 2.7 oz)   07/03/23 95.3 kg (210 lb)   05/08/23 100.7 kg (222 lb 0.1 oz)     Significant Labs:  Lab Results   Component Value Date    WBC 8.41 04/28/2023    HGB 14.9 04/28/2023    HCT 46.7 04/28/2023     04/28/2023     04/28/2023    K 4.7 04/28/2023     04/28/2023    CREATININE 1.1 04/28/2023    BUN 19 04/28/2023    CO2 23 04/28/2023     04/28/2023    CALCIUM 9.8 04/28/2023    ALKPHOS 73 04/28/2023    ALT 29 04/28/2023    AST 21 04/28/2023    ALBUMIN 4.4 04/28/2023    HGBA1C 6.2 (H) 04/28/2023     No LMP for male patient.  No results found for this or any previous visit (from the past 72 hour(s)).    EKG:   Results for orders placed or performed in visit on 10/26/21   IN OFFICE EKG 12-LEAD (to [place])    Collection Time: 10/26/21  3:21 PM    Narrative    Test Reason : R07.9,    Vent. Rate : 070 BPM     Atrial Rate : 070 BPM     P-R Int : 164 ms          QRS Dur : 086 ms      QT Int : 386 ms       P-R-T Axes : 048 045 043 degrees     QTc Int : 416 ms    Normal sinus rhythm  Normal ECG  No previous ECGs available  Confirmed by MARIAN RAZA MD ([id number]) on 10/27/2021 9:41:16 AM    Referred By: MD YU           Confirmed By:MARIAN RAZA MD       TTE:  No results found for this or any previous visit.  EF   Date Value Ref Range Status  "  11/03/2021 65 % Final      No results found for this or any previous visit.  KRIS:  No results found for this or any previous visit.  Stress Test:  No results found for this or any previous visit.     LHC:  No results found for this or any previous visit.     PFT:  No results found for: "FEV1", "FVC", "[id number]", "TLC", "DLCO"   ASSESSMENT/PLAN:        Pre-op Assessment    I have reviewed the Patient Summary Reports.    I have reviewed the NPO Status.   I have reviewed the Medications.     Review of Systems  Cardiovascular:     Hypertension                                        Neurological:    Neuromuscular Disease,                                       Physical Exam  General: Well nourished    Airway:  Mallampati: II   Mouth Opening: Normal  TM Distance: Normal  Tongue: Normal  Neck ROM: Normal ROM    Dental:  Intact        Anesthesia Plan  Type of Anesthesia, risks & benefits discussed:    Anesthesia Type: Gen ETT, Gen Natural Airway  Intra-op Monitoring Plan: Standard ASA Monitors  Post Op Pain Control Plan: multimodal analgesia  Induction:  IV  ASA Score: 2  Day of Surgery Review of History & Physical: H&P Update referred to the surgeon/provider.I have interviewed and examined the patient. I have reviewed the patient's H&P dated:     Ready For Surgery From Anesthesia Perspective.     .      "

## 2023-12-05 NOTE — PROVATION PATIENT INSTRUCTIONS
Discharge Summary/Instructions after an Endoscopic Procedure  Patient Name: Talon Leblanc  Patient MRN: [id number]  Patient YOB: 1975 Tuesday, December 5, 2023  Americo Vieira MD  Dear patient,  As a result of recent federal legislation (The Federal Cures Act), you may   receive lab or pathology results from your procedure in your [facility]   account before your physician is able to contact you. Your physician or   their representative will relay the results to you with their   recommendations at their soonest availability.  Thank you,  RESTRICTIONS:  During your procedure today, you received medications for sedation.  These   medications may affect your judgment, balance and coordination.  Therefore,   for 24 hours, you have the following restrictions:   - DO NOT drive a car, operate machinery, make legal/financial decisions,   sign important papers or drink alcohol.    ACTIVITY:  Today: no heavy lifting, straining or running due to procedural   sedation/anesthesia.  The following day: return to full activity including work.  DIET:  Eat and drink normally unless instructed otherwise.     TREATMENT FOR COMMON SIDE EFFECTS:  - Mild abdominal pain, nausea, belching, bloating or excessive gas:  rest,   eat lightly and use a heating pad.  - Sore Throat: treat with throat lozenges and/or gargle with warm salt   water.  - Because air was used during the procedure, expelling large amounts of air   from your rectum or belching is normal.  - If a bowel prep was taken, you may not have a bowel movement for 1-3 days.    This is normal.  SYMPTOMS TO WATCH FOR AND REPORT TO YOUR PHYSICIAN:  1. Abdominal pain or bloating, other than gas cramps.  2. Chest pain.  3. Back pain.  4. Signs of infection such as: chills or fever occurring within 24 hours   after the procedure.  5. Rectal bleeding, which would show as bright red, maroon, or black stools.   (A tablespoon of blood from the rectum is not serious,  especially if   hemorrhoids are present.)  6. Vomiting.  7. Weakness or dizziness.  GO DIRECTLY TO THE NEAREST EMERGENCY ROOM IF YOU HAVE ANY OF THE FOLLOWING:      Difficulty breathing              Chills and/or fever over 101 F   Persistent vomiting and/or vomiting blood   Severe abdominal pain   Severe chest pain   Black, tarry stools   Bleeding- more than one tablespoon   Any other symptom or condition that you feel may need urgent attention  Your doctor recommends these additional instructions:  If any biopsies were taken, your doctors clinic will contact you in 1 to 2   weeks with any results.  - Discharge patient to home.   - Resume previous diet.   - Continue present medications.   - Await pathology results.   - Repeat colonoscopy for surveillance based on pathology results.   - Return to referring physician as previously scheduled.  For questions, problems or results please call your physician - Americo Vieira MD at Work:  [phone].  [facility] EMERGENCY ROOM PHONE NUMBER: [phone]  IF A COMPLICATION OR EMERGENCY SITUATION ARISES AND YOU ARE UNABLE TO REACH   YOUR PHYSICIAN - GO DIRECTLY TO THE EMERGENCY ROOM.  MD Americo Banks MD  12/5/2023 11:15:42 AM  This report has been verified and signed electronically.  Dear patient,  As a result of recent federal legislation (The Federal Cures Act), you may   receive lab or pathology results from your procedure in your [facility]   account before your physician is able to contact you. Your physician or   their representative will relay the results to you with their   recommendations at their soonest availability.  Thank you,  PROVATION

## 2023-12-05 NOTE — H&P
[facility]  Colon and Rectal Surgery    [address]  [place], [place]  Tel: [phone]  Fax: [phone]  [email or web address]   MD Corby Guillory MD Brian Kann, MD W. Forrest Johnston, MD Matthew Giglia, MD Jennifer Paruch, MD William Kethman, MD Danielle Kay, MD     Patient name: Talon Leblanc Jr.   YOB: 1975   MRN: [id number]  Date of procedure: 12/05/2023    Procedure: Colonoscopy  Indications: Family history of colorectal cancer (Father) and Screening for colon cancer    No history of colonoscopy    The patient was informed of the availability of a certified  without charge. A certified  was not necessary for this visit.    Sedation plan: MAC  ASA: ASA 2 - Patient with mild systemic disease with no functional limitations    Review of Systems  See above    Past Medical History:   Diagnosis Date    Glaucoma     Hypertension     Multiple thyroid nodules     Prediabetes      Past Surgical History:   Procedure Laterality Date    CARPAL TUNNEL RELEASE Right 5/18/2022    Procedure: RELEASE, CARPAL TUNNEL,RIGHT;  Surgeon: Elicia Mckinney MD;  Location[facility];  Service: Orthopedics;  Laterality: Right;    CARPAL TUNNEL RELEASE Left 8/19/2022    Procedure: RELEASE, CARPAL TUNNEL,LEFT;  Surgeon: Elicia Mckinney MD;  Location: [facility] OR;  Service: Orthopedics;  Laterality: Left;    FRACTURE SURGERY      r femur fx w/ tatyana Right 2017     Family History   Problem Relation Age of Onset    Thyroid nodules Mother     Cancer Father         colon caner    Thyroid cancer Neg Hx      Social History     Tobacco Use    Smoking status: Never    Smokeless tobacco: Never   Substance Use Topics    Alcohol use: No    Drug use: No     Review of patient's allergies indicates:  No Known Allergies    Prior to Admission medications    Medication Sig Start Date End Date Taking? Authorizing Provider   latanoprost  "0.005 % ophthalmic solution Inject 2 drops into the eye Daily.   Yes Provider, Historical   losartan (COZAAR) 25 MG tablet Take 1 tablet (25 mg total) by mouth once daily. 4/28/23  Yes Giancarlo Covarrubias MD   rosuvastatin (CRESTOR) 20 MG tablet Take 1 tablet (20 mg total) by mouth once daily. 8/22/23 8/21/24 Yes Siomara Azar NP   diphenhydrAMINE-aluminum-magnesium hydroxide-simethicone-LIDOcaine HCl 2% Swish and spit 10 mLs every 6 (six) hours as needed (oral pain).  Patient not taking: Reported on 11/8/2023 7/3/23   Galileo Brizuela MD   LIDOcaine HCl 2% (LIDOCAINE VISCOUS) 2 % Soln by Mucous Membrane route 4 (four) times daily as needed (Tongue pain). Apply with a Q-tip to tongue sore before eating  Patient not taking: Reported on 11/8/2023 7/3/23   Galileo Brizuela MD       Physical Examination  BP (!) 149/79   Pulse 73   Temp 97.7 °F (36.5 °C)   Resp 15   Ht 5' 7" (1.702 m)   Wt 97.5 kg (215 lb)   SpO2 97%   BMI 33.67 kg/m²      Constitutional: well developed, no cough, no dyspnea, alert, and no acute distress    Head: Normocephalic, no lesions, without obvious abnormality  Eye: Normal external eye, conjunctiva, and lids, PERRL  Cardiovascular: regular rate and regular rhythm  Respiratory: normal air entry  Gastrointestinal: soft, non-tender, without masses or organomegaly  Neurologic: alert, oriented, normal speech, no focal findings or movement disorder noted  Psychiatric: appropriate, normal mood    Plan of Care    It was a pleasure meeting Mr. Leblanc today - we will plan to perform a colonoscopy with monitored anesthesia care. The details of the procedure, the possible need for biopsy or polypectomy and the potential risks including bleeding, perforation, missed polyps were discussed in detail and they consented to undergo the procedure.      Americo Vieira MD, FACS, FASCRS  Department of Colon & Rectal Surgery  [facility]    "

## 2023-12-05 NOTE — ANESTHESIA POSTPROCEDURE EVALUATION
Anesthesia Post Evaluation    Patient: Talon Leblanc Jr.    Procedure(s) Performed: Procedure(s) (LRB):  COLONOSCOPY (N/A)    Final Anesthesia Type: general      Patient location during evaluation: GI PACU  Patient participation: Yes- Able to Participate  Level of consciousness: awake and alert  Post-procedure vital signs: reviewed and stable  Pain management: adequate  Airway patency: patent    PONV status at discharge: No PONV  Anesthetic complications: no      Cardiovascular status: blood pressure returned to baseline and stable  Respiratory status: unassisted, room air and spontaneous ventilation  Hydration status: euvolemic  Follow-up not needed.              Vitals Value Taken Time   /84 12/05/23 1144   Temp 36.5 °C (97.7 °F) 12/05/23 1121   Pulse 64 12/05/23 1144   Resp 16 12/05/23 1144   SpO2 100 % 12/05/23 1144         No case tracking events are documented in the log.      Pain/Yessica Score: Yessica Score: 10 (12/5/2023 11:35 AM)

## 2023-12-07 LAB
FINAL PATHOLOGIC DIAGNOSIS: NORMAL
GROSS: NORMAL
Lab: NORMAL

## 2023-12-15 ENCOUNTER — HOSPITAL ENCOUNTER (OUTPATIENT)
Dept: ENDOCRINOLOGY | Facility: CLINIC | Age: 48
Discharge: HOME | End: 2023-12-15
Attending: NURSE PRACTITIONER
Payer: COMMERCIAL

## 2023-12-15 DIAGNOSIS — E04.2 MULTINODULAR GOITER (NONTOXIC): ICD-10-CM

## 2023-12-15 PROCEDURE — 10005 US FINE NEEDLE ASPIRATION THYROID, FIRST LESION: ICD-10-PCS | Mod: S$GLB,,, | Performed by: INTERNAL MEDICINE

## 2023-12-15 PROCEDURE — 10005 FNA BX W/US GDN 1ST LES: CPT | Mod: S$GLB,,, | Performed by: INTERNAL MEDICINE

## 2023-12-15 PROCEDURE — 88173 CYTOPATH EVAL FNA REPORT: CPT | Performed by: PATHOLOGY

## 2023-12-15 PROCEDURE — 88173 CYTOPATH EVAL FNA REPORT: CPT | Mod: 26,,, | Performed by: PATHOLOGY

## 2023-12-15 PROCEDURE — 88173 PR  INTERPRETATION OF FNA SMEAR: ICD-10-PCS | Mod: 26,,, | Performed by: PATHOLOGY

## 2023-12-19 LAB
FINAL PATHOLOGIC DIAGNOSIS: ABNORMAL
Lab: ABNORMAL

## 2023-12-20 ENCOUNTER — TELEPHONE (OUTPATIENT)
Dept: ENDOCRINOLOGY | Facility: CLINIC | Age: 48
End: 2023-12-20
Payer: COMMERCIAL

## 2023-12-20 DIAGNOSIS — E04.2 MULTINODULAR GOITER (NONTOXIC): Primary | ICD-10-CM

## 2024-02-20 DIAGNOSIS — E78.5 DYSLIPIDEMIA: ICD-10-CM

## 2024-02-20 DIAGNOSIS — E04.2 MULTINODULAR GOITER (NONTOXIC): Primary | ICD-10-CM

## 2024-02-28 ENCOUNTER — TELEPHONE (OUTPATIENT)
Dept: ENDOCRINOLOGY | Facility: CLINIC | Age: 49
End: 2024-02-28
Payer: COMMERCIAL

## 2024-04-10 ENCOUNTER — HOSPITAL ENCOUNTER (OUTPATIENT)
Dept: ENDOCRINOLOGY | Facility: CLINIC | Age: 49
Discharge: HOME | End: 2024-04-10
Attending: NURSE PRACTITIONER
Payer: COMMERCIAL

## 2024-04-10 DIAGNOSIS — E04.2 MULTINODULAR GOITER (NONTOXIC): Primary | ICD-10-CM

## 2024-04-10 PROCEDURE — 88173 CYTOPATH EVAL FNA REPORT: CPT | Performed by: PATHOLOGY

## 2024-04-10 PROCEDURE — 88173 CYTOPATH EVAL FNA REPORT: CPT | Mod: 26,,, | Performed by: PATHOLOGY

## 2024-04-10 PROCEDURE — 10005 FNA BX W/US GDN 1ST LES: CPT | Mod: S$GLB,,, | Performed by: INTERNAL MEDICINE

## 2024-04-17 LAB
COMMENT: ABNORMAL
FINAL PATHOLOGIC DIAGNOSIS: ABNORMAL
Lab: ABNORMAL

## 2024-04-18 ENCOUNTER — TELEPHONE (OUTPATIENT)
Dept: ENDOCRINOLOGY | Facility: CLINIC | Age: 49
End: 2024-04-18
Payer: COMMERCIAL

## 2024-04-18 NOTE — TELEPHONE ENCOUNTER
Reached out to patient about his results.    Please contact patient and let him know his biopsy was inconclusive and we can send specimen for molecular markers if he would like to call [employer]- give number and let us know. Thanks.       Give patient number [phone]

## 2024-04-22 ENCOUNTER — LAB VISIT (OUTPATIENT)
Dept: LAB | Facility: HOSPITAL | Age: 49
End: 2024-04-22
Attending: INTERNAL MEDICINE
Payer: COMMERCIAL

## 2024-04-22 DIAGNOSIS — R73.03 PREDIABETES: ICD-10-CM

## 2024-04-22 DIAGNOSIS — E78.00 PURE HYPERCHOLESTEROLEMIA: ICD-10-CM

## 2024-04-22 LAB
CHOLEST SERPL-MCNC: 214 MG/DL (ref 120–199)
CHOLEST/HDLC SERPL: 5.6 {RATIO} (ref 2–5)
ESTIMATED AVG GLUCOSE: 126 MG/DL (ref 68–131)
HBA1C MFR BLD: 6 % (ref 4–5.6)
HDLC SERPL-MCNC: 38 MG/DL (ref 40–75)
HDLC SERPL: 17.8 % (ref 20–50)
LDLC SERPL CALC-MCNC: 143.8 MG/DL (ref 63–159)
NONHDLC SERPL-MCNC: 176 MG/DL
TRIGL SERPL-MCNC: 161 MG/DL (ref 30–150)

## 2024-04-22 PROCEDURE — 80061 LIPID PANEL: CPT | Performed by: INTERNAL MEDICINE

## 2024-04-22 PROCEDURE — 83036 HEMOGLOBIN GLYCOSYLATED A1C: CPT | Performed by: INTERNAL MEDICINE

## 2024-04-22 PROCEDURE — 36415 COLL VENOUS BLD VENIPUNCTURE: CPT | Performed by: INTERNAL MEDICINE

## 2024-04-25 ENCOUNTER — PATIENT OUTREACH (OUTPATIENT)
Dept: ADMINISTRATIVE | Facility: HOSPITAL | Age: 49
End: 2024-04-25
Payer: COMMERCIAL

## 2024-04-30 NOTE — PROGRESS NOTES
Subjective:      Patient ID: Talon Leblanc Jr. is a 49 y.o. male.    Chief Complaint:  Thyroid Nodule    History of Present Illness  Talon Leblanc Jr. presents for evaluation & management of thyroid nodule. Previous patient of Dr Rolle and myself. Last seen in 2/2019.    The thyroid nodule was found on 2008. He felt the nodule on his neck. He went to endo at [facility] and reports he had nodule drained and lost to follow up. He saw Dr Rolle in 2019. Had biopsies and lost to follow up.     Last thyroid u/s: 2023    No FH of thyroid cancer  No personal history of radiation treatment or exposure     No difficulty breathing swallowing   No voice changes  No frequent clearing throat or hoarseness     No signs or symptoms of hyper or hypothyroidism    No weight changes  No bowel changes  No heat or cold intolerance   No hair nail or skin changes  No cp, palpations or sob    Any blood thinners- denies     FNA 2/2019  FINAL PATHOLOGIC DIAGNOSIS THYROID, LEFT LOBE DOMINANT NODULE, FINE-NEEDLE ASPIRATION: Benign. Scattered rare benign follicular cells, thin watery colloid, and macrophages, consistent with hyperplastic/adenomatoid nodule. Negative for features of papillary thyroid carcinoma    FINAL PATHOLOGIC DIAGNOSIS THYROID, ISTHMUS NODULE, FINE-NEEDLE ASPIRATION: Benign. Benign follicular cells, thin watery colloid, and numerous macrophages, consistent with hyperplastic/adenomatoid nodule. Negative for cytologic features of papillary thyroid carcinoma    6/1/2023 US thyroid  Impression:     Bilateral and isthmus TR 4 to TR 5 nodules with 1 of them increased size compared to 2018.  Reported prior biopsy with no clear results.  Recommend repeat FNA.  Left lobe TR 3 nodule not well delineated in prior study, requiring follow-up at 1, 3 and 5 year per ACR TI-RADS criteria.    Lab Results   Component Value Date    TSH 0.660 04/28/2023    FREET4 0.90 03/14/2011     FNA 12/2023 AUS and 4/2024 FLUS of left  "inferior nodule     Also with prediabetes,     FH of diabetes in father  Never been on medications for diabetes  He has tried to change diet since last time. He has changed to coke zero and has been doing lower sugar.   He is trying to stay active.     Lab Results   Component Value Date    HGBA1C 6.0 (H) 04/22/2024     With regards to dyslipidemia,     He is on Crestor 10 mg daily     Latest Reference Range & Units 04/28/23 09:48 04/22/24 08:53   Cholesterol Total 120 - 199 mg/dL 243 (H) 214 (H)   HDL 40 - 75 mg/dL 40 38 (L)   HDL/Cholesterol Ratio 20.0 - 50.0 % 16.5 (L) 17.8 (L)   Non-HDL Cholesterol mg/dL 203 176   Total Cholesterol/HDL Ratio 2.0 - 5.0  6.1 (H) 5.6 (H)   Triglycerides 30 - 150 mg/dL 194 (H) 161 (H)   LDL Cholesterol 63.0 - 159.0 mg/dL 164.2 (H) 143.8   (H): Data is abnormally high  (L): Data is abnormally low    Review of Systems   Constitutional:  Negative for fatigue and unexpected weight change.   Eyes:  Negative for visual disturbance.   Endocrine: Negative for polydipsia, polyphagia and polyuria.   And as above     Objective:   Physical Exam  Constitutional:       Appearance: He is obese.   Neck:      Thyroid: Thyromegaly present.      Comments: Irregularly shaped thyroid with nodules on right, left and isthmus  Pulmonary:      Effort: Pulmonary effort is normal.   Neurological:      Mental Status: He is alert.         Visit Vitals  /76 (BP Location: Left arm, Patient Position: Sitting, BP Method: Large (Manual))   Pulse 88   Ht 5' 7" (1.702 m)   Wt 94.7 kg (208 lb 12.4 oz)   SpO2 98%   BMI 32.70 kg/m²          Lab Review:   Lab Results   Component Value Date    HGBA1C 6.0 (H) 04/22/2024    HGBA1C 6.2 (H) 04/28/2023    HGBA1C 6.0 (H) 07/26/2022      Lab Results   Component Value Date    CHOL 214 (H) 04/22/2024    HDL 38 (L) 04/22/2024    LDLCALC 143.8 04/22/2024    TRIG 161 (H) 04/22/2024    CHOLHDL 17.8 (L) 04/22/2024     Lab Results   Component Value Date     04/28/2023    K 4.7 " 04/28/2023     04/28/2023    CO2 23 04/28/2023     04/28/2023    BUN 19 04/28/2023    CREATININE 1.1 04/28/2023    CALCIUM 9.8 04/28/2023    PROT 7.7 04/28/2023    ALBUMIN 4.4 04/28/2023    BILITOT 1.0 04/28/2023    ALKPHOS 73 04/28/2023    AST 21 04/28/2023    ALT 29 04/28/2023    ANIONGAP 12 04/28/2023    ESTGFRAFRICA >60.0 11/03/2021    EGFRNONAA >60.0 11/03/2021    TSH 0.660 04/28/2023     Vit D, 25-Hydroxy   Date Value Ref Range Status   03/14/2011 25 (L) >30 ng/mL Final     Comment:     Vitamin D, 25-Hydroxy:  Vitamin D deficiency <10 ng/mL  Vitamin D insufficiency 10-30 ng/mL  Vitamin D sufficiency >30 ng/mL     Assessment and Plan     1. Multinodular goiter (nontoxic)  TSH    Comprehensive Metabolic Panel    Hemoglobin A1C      2. Prediabetes        3. Dyslipidemia          Multinodular goiter (nontoxic)  Benign biopsies of isthmus and left nodules in 2019  Sounds like he had cyst drained on right in 2008 -not seen on last US  Denies compressive symptoms  FNA 12/2023 AUS and 4/2024 FLUS of left inferior nodule   Will send for MM    Declines thyroid surgery discussion at this time    Prediabetes  A1c 6.0%  He would like to work on lifestyle changes prior to starting medications   Given information on diet    Dyslipidemia  LDL goal closer to 100  LDL above goal   Continue crestor 10 mg daily  Check LP in future    Visit today included increased complexity associated with the care of episodic problems MNG, prediabetes, dyslipidemia addressed and managing longitudinal care of the patient due to serious managed problems MNG, prediabetes, dyslipidemia    Follow up in about 6 months (around 11/2/2024).

## 2024-05-01 DIAGNOSIS — I10 ESSENTIAL HYPERTENSION: ICD-10-CM

## 2024-05-01 NOTE — ASSESSMENT & PLAN NOTE
Benign biopsies of isthmus and left nodules in 2019  Sounds like he had cyst drained on right in 2008 -not seen on last US  Denies compressive symptoms  FNA 12/2023 AUS and 4/2024 FLUS of left inferior nodule   Will send for MM    Declines thyroid surgery discussion at this time

## 2024-05-01 NOTE — ASSESSMENT & PLAN NOTE
A1c 6.0%  He would like to work on lifestyle changes prior to starting medications   Given information on diet

## 2024-05-02 ENCOUNTER — OFFICE VISIT (OUTPATIENT)
Dept: ENDOCRINOLOGY | Facility: CLINIC | Age: 49
End: 2024-05-02
Payer: COMMERCIAL

## 2024-05-02 VITALS
WEIGHT: 208.75 LBS | BODY MASS INDEX: 32.76 KG/M2 | HEIGHT: 67 IN | HEART RATE: 88 BPM | OXYGEN SATURATION: 98 % | SYSTOLIC BLOOD PRESSURE: 128 MMHG | DIASTOLIC BLOOD PRESSURE: 76 MMHG

## 2024-05-02 DIAGNOSIS — E04.2 MULTINODULAR GOITER (NONTOXIC): Primary | ICD-10-CM

## 2024-05-02 DIAGNOSIS — E78.5 DYSLIPIDEMIA: ICD-10-CM

## 2024-05-02 DIAGNOSIS — R73.03 PREDIABETES: ICD-10-CM

## 2024-05-02 PROCEDURE — G2211 COMPLEX E/M VISIT ADD ON: HCPCS | Mod: S$GLB,,, | Performed by: NURSE PRACTITIONER

## 2024-05-02 PROCEDURE — 99999 PR PBB SHADOW E&M-EST. PATIENT-LVL IV: CPT | Mod: PBBFAC,,, | Performed by: NURSE PRACTITIONER

## 2024-05-02 PROCEDURE — 1160F RVW MEDS BY RX/DR IN RCRD: CPT | Mod: CPTII,S$GLB,, | Performed by: NURSE PRACTITIONER

## 2024-05-02 PROCEDURE — 3074F SYST BP LT 130 MM HG: CPT | Mod: CPTII,S$GLB,, | Performed by: NURSE PRACTITIONER

## 2024-05-02 PROCEDURE — 3044F HG A1C LEVEL LT 7.0%: CPT | Mod: CPTII,S$GLB,, | Performed by: NURSE PRACTITIONER

## 2024-05-02 PROCEDURE — 3008F BODY MASS INDEX DOCD: CPT | Mod: CPTII,S$GLB,, | Performed by: NURSE PRACTITIONER

## 2024-05-02 PROCEDURE — 1159F MED LIST DOCD IN RCRD: CPT | Mod: CPTII,S$GLB,, | Performed by: NURSE PRACTITIONER

## 2024-05-02 PROCEDURE — 99214 OFFICE O/P EST MOD 30 MIN: CPT | Mod: S$GLB,,, | Performed by: NURSE PRACTITIONER

## 2024-05-02 PROCEDURE — 3078F DIAST BP <80 MM HG: CPT | Mod: CPTII,S$GLB,, | Performed by: NURSE PRACTITIONER

## 2024-05-02 RX ORDER — ROSUVASTATIN CALCIUM 10 MG/1
10 TABLET, COATED ORAL DAILY
COMMUNITY
Start: 2023-11-08 | End: 2024-05-08 | Stop reason: SDUPTHER

## 2024-05-08 ENCOUNTER — OFFICE VISIT (OUTPATIENT)
Dept: INTERNAL MEDICINE | Facility: CLINIC | Age: 49
End: 2024-05-08
Payer: COMMERCIAL

## 2024-05-08 VITALS
BODY MASS INDEX: 32.98 KG/M2 | HEART RATE: 81 BPM | DIASTOLIC BLOOD PRESSURE: 78 MMHG | OXYGEN SATURATION: 99 % | SYSTOLIC BLOOD PRESSURE: 120 MMHG | WEIGHT: 210.13 LBS | HEIGHT: 67 IN

## 2024-05-08 DIAGNOSIS — Z91.199 HISTORY OF NONCOMPLIANCE WITH MEDICAL TREATMENT: ICD-10-CM

## 2024-05-08 DIAGNOSIS — I10 ESSENTIAL HYPERTENSION: ICD-10-CM

## 2024-05-08 DIAGNOSIS — E78.00 PURE HYPERCHOLESTEROLEMIA: Primary | ICD-10-CM

## 2024-05-08 DIAGNOSIS — R73.03 PREDIABETES: ICD-10-CM

## 2024-05-08 PROCEDURE — 99396 PREV VISIT EST AGE 40-64: CPT | Mod: S$GLB,,, | Performed by: INTERNAL MEDICINE

## 2024-05-08 PROCEDURE — 1160F RVW MEDS BY RX/DR IN RCRD: CPT | Mod: CPTII,S$GLB,, | Performed by: INTERNAL MEDICINE

## 2024-05-08 PROCEDURE — 3074F SYST BP LT 130 MM HG: CPT | Mod: CPTII,S$GLB,, | Performed by: INTERNAL MEDICINE

## 2024-05-08 PROCEDURE — 3008F BODY MASS INDEX DOCD: CPT | Mod: CPTII,S$GLB,, | Performed by: INTERNAL MEDICINE

## 2024-05-08 PROCEDURE — 99999 PR PBB SHADOW E&M-EST. PATIENT-LVL IV: CPT | Mod: PBBFAC,,, | Performed by: INTERNAL MEDICINE

## 2024-05-08 PROCEDURE — 3044F HG A1C LEVEL LT 7.0%: CPT | Mod: CPTII,S$GLB,, | Performed by: INTERNAL MEDICINE

## 2024-05-08 PROCEDURE — 1159F MED LIST DOCD IN RCRD: CPT | Mod: CPTII,S$GLB,, | Performed by: INTERNAL MEDICINE

## 2024-05-08 PROCEDURE — 3078F DIAST BP <80 MM HG: CPT | Mod: CPTII,S$GLB,, | Performed by: INTERNAL MEDICINE

## 2024-05-08 PROCEDURE — 4010F ACE/ARB THERAPY RXD/TAKEN: CPT | Mod: CPTII,S$GLB,, | Performed by: INTERNAL MEDICINE

## 2024-05-08 RX ORDER — ROSUVASTATIN CALCIUM 20 MG/1
20 TABLET, COATED ORAL DAILY
Qty: 90 TABLET | Refills: 3 | Status: SHIPPED | OUTPATIENT
Start: 2024-05-08

## 2024-05-08 RX ORDER — LOSARTAN POTASSIUM 25 MG/1
25 TABLET ORAL DAILY
Qty: 90 TABLET | Refills: 3 | Status: SHIPPED | OUTPATIENT
Start: 2024-05-08

## 2024-05-08 NOTE — PATIENT INSTRUCTIONS
Continue losartan 25 mg daily  Continue rosuvastatin 20 mg daily.     Return to clinic in 6 months or sooner if needed.

## 2024-05-08 NOTE — PROGRESS NOTES
"Subjective:       Patient ID: Talon Leblanc Jr. is a 49 y.o. male.    Chief Complaint: Follow-up      HPI  Talon Leblanc Jr. is a 49 y.o. year old male with hypertension, prediabetes, glaucoma presents for follow-up.  Since last visit, had to thyroid biopsies with inconclusive findings.    Review of Systems   Constitutional:  Negative for activity change, appetite change, chills, fatigue, fever and unexpected weight change.   HENT:  Negative for congestion, rhinorrhea and sore throat.    Eyes:  Negative for visual disturbance.   Respiratory:  Negative for shortness of breath.    Cardiovascular:  Negative for chest pain.   Gastrointestinal:  Negative for abdominal pain, diarrhea, nausea and vomiting.   Genitourinary:  Negative for difficulty urinating and dysuria.   Musculoskeletal:  Negative for arthralgias, back pain and myalgias.   Skin:  Negative for color change and rash.   Neurological:  Negative for dizziness, weakness and headaches.         Past Medical History:   Diagnosis Date    Glaucoma     Hypertension     Multiple thyroid nodules     Prediabetes         Prior to Admission medications    Medication Sig Start Date End Date Taking? Authorizing Provider   latanoprost 0.005 % ophthalmic solution Inject 2 drops into the eye Daily.   Yes Provider, Historical   losartan (COZAAR) 25 MG tablet Take 1 tablet (25 mg total) by mouth once daily. 4/28/23  Yes Giancarlo Covarrubias MD   rosuvastatin (CRESTOR) 10 MG tablet Take 10 mg by mouth once daily. 11/8/23  Yes Provider, Historical        Past medical history, surgical history, and family medical history reviewed and updated as appropriate.    Medications and allergies reviewed.     Objective:          Vitals:    05/08/24 1409   BP: 120/78   BP Location: Right arm   Patient Position: Sitting   Pulse: 81   SpO2: 99%   Weight: 95.3 kg (210 lb 1.6 oz)   Height: 5' 7" (1.702 m)     Body mass index is 32.91 kg/m².  Physical Exam  Constitutional:       " General: He is not in acute distress.     Appearance: He is well-developed.   HENT:      Head: Normocephalic and atraumatic.      Nose: Nose normal.   Eyes:      General: No scleral icterus.     Extraocular Movements: Extraocular movements intact.   Neck:      Thyroid: No thyromegaly.      Vascular: No JVD.      Trachea: No tracheal deviation.   Cardiovascular:      Rate and Rhythm: Normal rate and regular rhythm.      Heart sounds: Normal heart sounds. No murmur heard.     No friction rub. No gallop.   Pulmonary:      Effort: Pulmonary effort is normal. No respiratory distress.      Breath sounds: Normal breath sounds. No wheezing or rales.   Abdominal:      General: Bowel sounds are normal. There is no distension.      Palpations: Abdomen is soft. There is no mass.      Tenderness: There is no abdominal tenderness.   Musculoskeletal:         General: No tenderness. Normal range of motion.      Cervical back: Normal range of motion and neck supple.   Lymphadenopathy:      Cervical: No cervical adenopathy.   Skin:     General: Skin is warm and dry.      Findings: No rash.   Neurological:      Mental Status: He is alert and oriented to person, place, and time.      Cranial Nerves: No cranial nerve deficit.      Deep Tendon Reflexes: Reflexes normal.   Psychiatric:         Behavior: Behavior normal.         Lab Results   Component Value Date    WBC 8.41 04/28/2023    HGB 14.9 04/28/2023    HCT 46.7 04/28/2023     04/28/2023    CHOL 214 (H) 04/22/2024    TRIG 161 (H) 04/22/2024    HDL 38 (L) 04/22/2024    ALT 29 04/28/2023    AST 21 04/28/2023     04/28/2023    K 4.7 04/28/2023     04/28/2023    CREATININE 1.1 04/28/2023    BUN 19 04/28/2023    CO2 23 04/28/2023    TSH 0.660 04/28/2023    HGBA1C 6.0 (H) 04/22/2024       Assessment:       1. Pure hypercholesterolemia    2. Essential hypertension    3. Prediabetes    4. History of noncompliance with medical treatment          Plan:     Talon was  seen today for follow-up.    Diagnoses and all orders for this visit:    Pure hypercholesterolemia  -     rosuvastatin (CRESTOR) 20 MG tablet; Take 1 tablet (20 mg total) by mouth once daily.    Essential hypertension  Comments:  noted in 2012, was on HCTZ, off medications since 2013; weight gain of 20-30 over last few years. losartan 25 mg daily prescribed. BP logs, nurse visit 2 weeks  Orders:  -     losartan (COZAAR) 25 MG tablet; Take 1 tablet (25 mg total) by mouth once daily.  -     CBC W/ AUTO DIFFERENTIAL; Future    Prediabetes  -     losartan (COZAAR) 25 MG tablet; Take 1 tablet (25 mg total) by mouth once daily.    History of noncompliance with medical treatment  Comments:  has been missing >50% of medicatiosn (last med refill 11/2023, spoke to [facility] pharmacy)    <50% compliance with medication - Discussed medical compliance.   S/p thyroid biopsy x 2, AUS, FLUS, sent for molecular markers. Follows with endocrinology  Prediabetes - discussed lifestyle modifications, avoidance of sugar and carbohydrates.  Glaucoma - follows with ophthalmology [facility]  HTN - controlled on losartan 25, no changes to current management    Health maintenance reviewed with patient.     Follow up in about 6 months (around 11/8/2024).    Giancarlo Covarrubias MD  Internal Medicine / Primary Care  [facility]  5/8/2024

## 2024-06-10 ENCOUNTER — PATIENT MESSAGE (OUTPATIENT)
Dept: INTERNAL MEDICINE | Facility: CLINIC | Age: 49
End: 2024-06-10
Payer: COMMERCIAL

## 2024-06-17 ENCOUNTER — TELEPHONE (OUTPATIENT)
Dept: ENDOCRINOLOGY | Facility: CLINIC | Age: 49
End: 2024-06-17
Payer: COMMERCIAL

## 2024-07-12 ENCOUNTER — PATIENT MESSAGE (OUTPATIENT)
Dept: ENDOCRINOLOGY | Facility: CLINIC | Age: 49
End: 2024-07-12
Payer: COMMERCIAL

## 2024-07-12 DIAGNOSIS — E04.2 MULTINODULAR GOITER (NONTOXIC): Primary | ICD-10-CM

## 2024-08-02 ENCOUNTER — LAB VISIT (OUTPATIENT)
Dept: LAB | Facility: HOSPITAL | Age: 49
End: 2024-08-02
Payer: COMMERCIAL

## 2024-08-02 DIAGNOSIS — E04.2 MULTINODULAR GOITER (NONTOXIC): ICD-10-CM

## 2024-08-02 DIAGNOSIS — I10 ESSENTIAL HYPERTENSION: ICD-10-CM

## 2024-08-02 LAB
ALBUMIN SERPL BCP-MCNC: 4.3 G/DL (ref 3.5–5.2)
ALBUMIN SERPL BCP-MCNC: 4.3 G/DL (ref 3.5–5.2)
ALP SERPL-CCNC: 70 U/L (ref 55–135)
ALP SERPL-CCNC: 70 U/L (ref 55–135)
ALT SERPL W/O P-5'-P-CCNC: 24 U/L (ref 10–44)
ALT SERPL W/O P-5'-P-CCNC: 24 U/L (ref 10–44)
ANION GAP SERPL CALC-SCNC: 9 MMOL/L (ref 8–16)
ANION GAP SERPL CALC-SCNC: 9 MMOL/L (ref 8–16)
AST SERPL-CCNC: 19 U/L (ref 10–40)
AST SERPL-CCNC: 19 U/L (ref 10–40)
BASOPHILS # BLD AUTO: 0.04 K/UL (ref 0–0.2)
BASOPHILS NFR BLD: 0.5 % (ref 0–1.9)
BILIRUB SERPL-MCNC: 1 MG/DL (ref 0.1–1)
BILIRUB SERPL-MCNC: 1 MG/DL (ref 0.1–1)
BUN SERPL-MCNC: 15 MG/DL (ref 6–20)
BUN SERPL-MCNC: 15 MG/DL (ref 6–20)
CALCIUM SERPL-MCNC: 9.1 MG/DL (ref 8.7–10.5)
CALCIUM SERPL-MCNC: 9.1 MG/DL (ref 8.7–10.5)
CHLORIDE SERPL-SCNC: 109 MMOL/L (ref 95–110)
CHLORIDE SERPL-SCNC: 109 MMOL/L (ref 95–110)
CO2 SERPL-SCNC: 23 MMOL/L (ref 23–29)
CO2 SERPL-SCNC: 23 MMOL/L (ref 23–29)
CREAT SERPL-MCNC: 1 MG/DL (ref 0.5–1.4)
CREAT SERPL-MCNC: 1 MG/DL (ref 0.5–1.4)
DIFFERENTIAL METHOD BLD: ABNORMAL
EOSINOPHIL # BLD AUTO: 0.3 K/UL (ref 0–0.5)
EOSINOPHIL NFR BLD: 3.2 % (ref 0–8)
ERYTHROCYTE [DISTWIDTH] IN BLOOD BY AUTOMATED COUNT: 12.8 % (ref 11.5–14.5)
EST. GFR  (NO RACE VARIABLE): >60 ML/MIN/1.73 M^2
EST. GFR  (NO RACE VARIABLE): >60 ML/MIN/1.73 M^2
ESTIMATED AVG GLUCOSE: 123 MG/DL (ref 68–131)
GLUCOSE SERPL-MCNC: 106 MG/DL (ref 70–110)
GLUCOSE SERPL-MCNC: 106 MG/DL (ref 70–110)
HBA1C MFR BLD: 5.9 % (ref 4–5.6)
HCT VFR BLD AUTO: 41.8 % (ref 40–54)
HGB BLD-MCNC: 13.6 G/DL (ref 14–18)
IMM GRANULOCYTES # BLD AUTO: 0.06 K/UL (ref 0–0.04)
IMM GRANULOCYTES NFR BLD AUTO: 0.7 % (ref 0–0.5)
LYMPHOCYTES # BLD AUTO: 3.4 K/UL (ref 1–4.8)
LYMPHOCYTES NFR BLD: 39 % (ref 18–48)
MCH RBC QN AUTO: 31.1 PG (ref 27–31)
MCHC RBC AUTO-ENTMCNC: 32.5 G/DL (ref 32–36)
MCV RBC AUTO: 95 FL (ref 82–98)
MONOCYTES # BLD AUTO: 0.7 K/UL (ref 0.3–1)
MONOCYTES NFR BLD: 7.6 % (ref 4–15)
NEUTROPHILS # BLD AUTO: 4.3 K/UL (ref 1.8–7.7)
NEUTROPHILS NFR BLD: 49 % (ref 38–73)
NRBC BLD-RTO: 0 /100 WBC
PLATELET # BLD AUTO: 232 K/UL (ref 150–450)
PMV BLD AUTO: 9.7 FL (ref 9.2–12.9)
POTASSIUM SERPL-SCNC: 3.6 MMOL/L (ref 3.5–5.1)
POTASSIUM SERPL-SCNC: 3.6 MMOL/L (ref 3.5–5.1)
PROT SERPL-MCNC: 7.2 G/DL (ref 6–8.4)
PROT SERPL-MCNC: 7.2 G/DL (ref 6–8.4)
RBC # BLD AUTO: 4.38 M/UL (ref 4.6–6.2)
SODIUM SERPL-SCNC: 141 MMOL/L (ref 136–145)
SODIUM SERPL-SCNC: 141 MMOL/L (ref 136–145)
TSH SERPL DL<=0.005 MIU/L-ACNC: 0.99 UIU/ML (ref 0.4–4)
WBC # BLD AUTO: 8.7 K/UL (ref 3.9–12.7)

## 2024-08-02 PROCEDURE — 36415 COLL VENOUS BLD VENIPUNCTURE: CPT | Performed by: INTERNAL MEDICINE

## 2024-08-02 PROCEDURE — 83036 HEMOGLOBIN GLYCOSYLATED A1C: CPT | Performed by: NURSE PRACTITIONER

## 2024-08-02 PROCEDURE — 80053 COMPREHEN METABOLIC PANEL: CPT | Performed by: INTERNAL MEDICINE

## 2024-08-02 PROCEDURE — 85025 COMPLETE CBC W/AUTO DIFF WBC: CPT | Performed by: INTERNAL MEDICINE

## 2024-08-02 PROCEDURE — 84443 ASSAY THYROID STIM HORMONE: CPT | Performed by: NURSE PRACTITIONER

## 2024-08-20 ENCOUNTER — OFFICE VISIT (OUTPATIENT)
Dept: URGENT CARE | Facility: CLINIC | Age: 49
End: 2024-08-20
Payer: COMMERCIAL

## 2024-08-20 VITALS
BODY MASS INDEX: 32.96 KG/M2 | DIASTOLIC BLOOD PRESSURE: 96 MMHG | HEART RATE: 82 BPM | RESPIRATION RATE: 16 BRPM | HEIGHT: 67 IN | OXYGEN SATURATION: 95 % | TEMPERATURE: 98 F | SYSTOLIC BLOOD PRESSURE: 157 MMHG | WEIGHT: 210 LBS

## 2024-08-20 DIAGNOSIS — M54.9 BACK PAIN, UNSPECIFIED BACK LOCATION, UNSPECIFIED BACK PAIN LATERALITY, UNSPECIFIED CHRONICITY: ICD-10-CM

## 2024-08-20 DIAGNOSIS — M54.50 ACUTE RIGHT-SIDED LOW BACK PAIN WITHOUT SCIATICA: Primary | ICD-10-CM

## 2024-08-20 LAB
BILIRUBIN, UA POC OHS: NEGATIVE
BLOOD, UA POC OHS: NEGATIVE
CLARITY, UA POC OHS: CLEAR
COLOR, UA POC OHS: YELLOW
GLUCOSE, UA POC OHS: NEGATIVE
KETONES, UA POC OHS: NEGATIVE
LEUKOCYTES, UA POC OHS: NEGATIVE
NITRITE, UA POC OHS: NEGATIVE
PH, UA POC OHS: 5.5
PROTEIN, UA POC OHS: NEGATIVE
SPECIFIC GRAVITY, UA POC OHS: >=1.03
UROBILINOGEN, UA POC OHS: 0.2

## 2024-08-20 PROCEDURE — 81003 URINALYSIS AUTO W/O SCOPE: CPT | Mod: QW,S$GLB,, | Performed by: NURSE PRACTITIONER

## 2024-08-20 PROCEDURE — 72100 X-RAY EXAM L-S SPINE 2/3 VWS: CPT | Mod: S$GLB,,, | Performed by: RADIOLOGY

## 2024-08-20 PROCEDURE — 99214 OFFICE O/P EST MOD 30 MIN: CPT | Mod: 25,S$GLB,, | Performed by: NURSE PRACTITIONER

## 2024-08-20 PROCEDURE — 96372 THER/PROPH/DIAG INJ SC/IM: CPT | Mod: S$GLB,,, | Performed by: NURSE PRACTITIONER

## 2024-08-20 RX ORDER — IBUPROFEN 800 MG/1
800 TABLET ORAL EVERY 6 HOURS PRN
Qty: 20 TABLET | Refills: 0 | Status: SHIPPED | OUTPATIENT
Start: 2024-08-20

## 2024-08-20 RX ORDER — METHOCARBAMOL 500 MG/1
500 TABLET, FILM COATED ORAL 3 TIMES DAILY
Qty: 30 TABLET | Refills: 0 | Status: SHIPPED | OUTPATIENT
Start: 2024-08-20 | End: 2024-08-30

## 2024-08-20 RX ORDER — KETOROLAC TROMETHAMINE 30 MG/ML
30 INJECTION, SOLUTION INTRAMUSCULAR; INTRAVENOUS
Status: COMPLETED | OUTPATIENT
Start: 2024-08-20 | End: 2024-08-20

## 2024-08-20 RX ADMIN — KETOROLAC TROMETHAMINE 30 MG: 30 INJECTION, SOLUTION INTRAMUSCULAR; INTRAVENOUS at 02:08

## 2024-08-20 NOTE — PROGRESS NOTES
"Subjective:      Patient ID: Talon Leblanc Jr. is a 49 y.o. male.    Vitals:  height is 5' 7" (1.702 m) and weight is 95.3 kg (210 lb). His oral temperature is 98.4 °F (36.9 °C). His blood pressure is 157/96 (abnormal) and his pulse is 82. His respiration is 16 and oxygen saturation is 95%.     Chief Complaint: Back Pain    Patient presents c.o. lower back pain especially on right side, non radiating.Symps include sharp pain.Symptoms began 1 week ago. Patient only placed ice on his back to relieve his pain.  Works for [employer] lifting boxes but denies an injury.  States that he was at home watching TV sitting on his couch when his pain started.    Worse with position.  Sleeping at night with a pillow under the right side of his back for support.    Denies  complaints.  Denies numbness, weakness, loss of bowel/bladder control.      Back Pain  The current episode started in the past 7 days. The problem occurs constantly. The problem is unchanged. The quality of the pain is described as aching. The pain does not radiate. The pain is at a severity of 7/10. The pain is mild. The pain is The same all the time. The symptoms are aggravated by lying down and bending. Stiffness is present All day. Associated symptoms include tingling. Pertinent negatives include no abdominal pain, bladder incontinence, bowel incontinence, chest pain, dysuria, fever, headaches, leg pain, numbness, paresis, paresthesias, pelvic pain, perianal numbness, weakness or weight loss. He has tried ice for the symptoms. The treatment provided mild relief.       Constitution: Negative for fever.   Cardiovascular:  Negative for chest pain.   Gastrointestinal:  Negative for abdominal pain and bowel incontinence.   Genitourinary:  Negative for dysuria, bladder incontinence and pelvic pain.   Musculoskeletal:  Positive for back pain.   Neurological:  Negative for headaches and numbness.      Objective:     Physical Exam   Constitutional: He is " oriented to person, place, and time. He appears well-developed. He is cooperative.  Non-toxic appearance. He does not appear ill. No distress.   HENT:   Head: Normocephalic and atraumatic.   Nose: Nose normal.   Mouth/Throat: Oropharynx is clear and moist and mucous membranes are normal.   Eyes: Conjunctivae and lids are normal.   Neck: Trachea normal and phonation normal. Neck supple.   Cardiovascular: Normal rate, regular rhythm, normal heart sounds and normal pulses.   Pulmonary/Chest: Effort normal and breath sounds normal.   Abdominal: Normal appearance and bowel sounds are normal. He exhibits no mass. Soft.   Musculoskeletal:         General: No deformity.      Lumbar back: He exhibits tenderness. He exhibits normal range of motion, no bony tenderness, no swelling, no edema, no deformity, no laceration and no spasm.      Comments: Paraspinal tenderness to mid and right side with no bony tenderness.  Full ROM.  +2 pulses.  Good strength and sensation distally.  +2 DTRs.   Neurological: He is alert and oriented to person, place, and time. He has normal strength and normal reflexes. No sensory deficit.   Skin: Skin is warm, dry, intact and not diaphoretic.   Psychiatric: His speech is normal and behavior is normal. Judgment and thought content normal.   Nursing note and vitals reviewed.      Assessment:     1. Acute right-sided low back pain without sciatica    2. Back pain, unspecified back location, unspecified back pain laterality, unspecified chronicity        Plan:     Results for orders placed or performed in visit on 08/20/24   POCT Urinalysis(Instrument)   Result Value Ref Range    Color, POC UA Yellow Yellow, Straw, Colorless    Clarity, POC UA Clear Clear    Glucose, POC UA Negative Negative    Bilirubin, POC UA Negative Negative    Ketones, POC UA Negative Negative    Spec Grav POC UA >=1.030 1.005 - 1.030    Blood, POC UA Negative Negative    pH, POC UA 5.5 5.0 - 8.0    Protein, POC UA Negative  Negative    Urobilinogen, POC UA 0.2 <=1.0    Nitrite, POC UA Negative Negative    WBC, POC UA Negative Negative       Patient rechecked in clinic post injection of toradol with improvement of symptoms.  Xray reviewed with patient.     Acute right-sided low back pain without sciatica  -     POCT Urinalysis(Instrument)  -     X-Ray Lumbar Spine Ap And Lateral; Future; Expected date: 08/20/2024  -     ketorolac injection 30 mg    Back pain, unspecified back location, unspecified back pain laterality, unspecified chronicity      Patient Instructions   Please drink plenty of fluids.  Please get plenty of rest.  Please return here or go to the Emergency Department for any concerns or worsening of condition.  If you were prescribed a muscle relaxant medication, do not drive or operate heavy equipment or machinery while taking these medications.  You were given Toradol in clinic, do not start prescription NSAID till tomorrow.  If you were not prescribed an anti-inflammatory medication, and if you do not have any history of stomach/intestinal ulcers, or kidney disease, or are not taking a blood thinner such as Coumadin, Plavix, Pradaxa, Eloquis, or Xaralta for example, it is OK to take over the counter Ibuprofen or Advil or Motrin or Aleve as directed.  Do not take these medications on an empty stomach.  If you lose control of your bowel and/or bladder, please go to the nearest Emergency Department immediately.  If you lose sensation in between your legs by your genitalia and/or rectum, please go to the nearest Emergency Department immediately.  If you lose control or sensation of any extremity, please go to the nearest Emergency Department immediately.  Please follow up with your primary care doctor or specialist as needed.    If you  smoke, please stop smoking.

## 2024-08-20 NOTE — PATIENT INSTRUCTIONS
Please drink plenty of fluids.  Please get plenty of rest.  Please return here or go to the Emergency Department for any concerns or worsening of condition.  If you were prescribed a muscle relaxant medication, do not drive or operate heavy equipment or machinery while taking these medications.  You were given Toradol in clinic, do not start prescription NSAID till tomorrow.  If you were not prescribed an anti-inflammatory medication, and if you do not have any history of stomach/intestinal ulcers, or kidney disease, or are not taking a blood thinner such as Coumadin, Plavix, Pradaxa, Eloquis, or Xaralta for example, it is OK to take over the counter Ibuprofen or Advil or Motrin or Aleve as directed.  Do not take these medications on an empty stomach.  If you lose control of your bowel and/or bladder, please go to the nearest Emergency Department immediately.  If you lose sensation in between your legs by your genitalia and/or rectum, please go to the nearest Emergency Department immediately.  If you lose control or sensation of any extremity, please go to the nearest Emergency Department immediately.  Please follow up with your primary care doctor or specialist as needed.    If you  smoke, please stop smoking.

## 2024-09-06 NOTE — PROGRESS NOTES
Endocrine Surgery History & Physical     REFERRING PROVIDER: Siomara Azar NP    REASON FOR VISIT: Multinodular thyroid goiter, dominant left inferior nodule with positive molecular markers    HPI: Talon Leblanc Jr. is a 49 y.o. male patient with a history notable for glaucoma, HTN, and prediabetes who presents in consultation for management of non toxic multinodular goiter. The thyroid nodule was found on 2008. He felt the nodule on his neck. He went to endocrinology at [facility] and reports he had nodule drained and lost to follow up. He saw Dr. Rolle in 2019. Had biopsies and lost to follow up. Most recently, underwent US in 2023 with some interval change (see below). Left inferior nodule was biopsied showing FLUS, molecular testing confers a 95% risk of malignancy.     Details of the workup are as follows:     Recent laboratory studies:  TSH: 0.989  Free T4: 0.90   Thyroid antibodies: not obtained  Vitamin D: 25 in 2011    Thyroid and Neck Imaging:  Most recent thyroid ultrasound: 4/2023  Dominant thyroid nodules:   Right inferior, 2.4 cm, TR5  Isthmus, 3.2cm, TR4  Left mid lobe, 2.2 cm, TR3  Left inferior, 4.1 cm, TR5  Interval changes from prior ultrasound: interval growth   Lymph node mapping: no  Neck or chest CT: no    Cytology:  FNA biopsy of left inferior, 4.1 cm, TR5  Cytology: [place] III, FLUS and AUS previously  Molecular testing: positive for NRAS Q61R and positive ThyraMir V2    Medications:  Levothyroxine: no  Vitamin D supplementation: no  Lithium, biotin, amiodarone, iodinated contrast: none  Calcium supplements or calcimetrics: none    Symptoms:   Thyroid symptoms: denies overt hyperthyroid or hypothyroid symptoms  Parathyroid screening: calcium levels normal, denies nephrolithiasis, fracture, muscle/joint pain, abdominal pain/cramping, brain fog, concentration difficulties  Compressive symptoms: denies dysphagia, globus sensation, compression symptoms, or anterior neck pain.     Voice symptoms: denies hoarseness, voice changes or increased need to clear the throat.    Surgical risk factors:  Risk of concurrent parathyroid disease low  History of neck radiation: none  Prior neck surgery: none  Functional status: can tolerate >4 METS, walks 1-2 miles per day  Cardiovascular risk: stress echo 2021 normal   Antiplatelet therapy and anticoagulation: no  No prior gastric surgery, no IBS/IBD    Bone Health:  DEXA scan on: none    Family history:  No family history of endocrinopathies or endocrine cancers including thyroid diease, thyroid cancer, parathyroid disease, hypercalcemia.      LABORATORY STUDIES:  I personally and independently reviewed relevant lab test results, including the following:             PAST MEDICAL HISTORY:  Patient Active Problem List   Diagnosis    Bilateral carpal tunnel syndrome    Multinodular goiter (nontoxic)    Prediabetes    Dyslipidemia        PAST SURGICAL HISTORY:  Past Surgical History:   Procedure Laterality Date    CARPAL TUNNEL RELEASE Right 5/18/2022    Procedure: RELEASE, CARPAL TUNNEL,RIGHT;  Surgeon: Elicia Mckinney MD;  Location: [facility] OR;  Service: Orthopedics;  Laterality: Right;    CARPAL TUNNEL RELEASE Left 8/19/2022    Procedure: RELEASE, CARPAL TUNNEL,LEFT;  Surgeon: Elicia Mckinney MD;  Location: [facility] OR;  Service: Orthopedics;  Laterality: Left;    COLONOSCOPY N/A 12/5/2023    Procedure: COLONOSCOPY;  Surgeon: Americo Vieira MD;  [facility]  Service: Colon and Rectal;  Laterality: N/A;  suprep-portal-m esquivel-tb  11/28-lvm for precall-MS  12/4-pt confirmed appt-KPvt    FRACTURE SURGERY      r femur fx w/ tatyana Right 2017        MEDICATIONS:  Current Outpatient Medications   Medication Sig Dispense Refill    ibuprofen (ADVIL,MOTRIN) 800 MG tablet Take 1 tablet (800 mg total) by mouth every 6 (six) hours as needed for Pain. 20 tablet 0    latanoprost 0.005 % ophthalmic solution Inject 2 drops into the eye Daily.    "   losartan (COZAAR) 25 MG tablet Take 1 tablet (25 mg total) by mouth once daily. 90 tablet 3    rosuvastatin (CRESTOR) 20 MG tablet Take 1 tablet (20 mg total) by mouth once daily. 90 tablet 3     No current facility-administered medications for this visit.     Facility-Administered Medications Ordered in Other Visits   Medication Dose Route Frequency Provider Last Rate Last Admin    0.9%  NaCl infusion   Intravenous Continuous Serafin Avina MD 10 mL/hr at 08/19/22 0830 New Bag at 08/19/22 0830    LIDOcaine (PF) 10 mg/ml (1%) injection 10 mg  1 mL Intradermal Once Serafin Avina MD        mupirocin 2 % ointment   Nasal On Call Procedure Sharon Tirado MD   Given at 08/19/22 0830       ALLERGIES:  Review of patient's allergies indicates:  No Known Allergies    SOCIAL HISTORY:  Social History     Tobacco Use    Smoking status: Never    Smokeless tobacco: Never   Substance Use Topics    Alcohol use: No    Drug use: No        FAMILY HISTORY:  Family History   Problem Relation Name Age of Onset    Thyroid nodules Mother      Cancer Father          colon caner    Thyroid cancer Neg Hx           REVIEW OF SYSTEMS:  A detailed review of systems was reviewed with the patient, pertinent positives and negatives are presented in the note and is otherwise negative.    PHYSICAL EXAMINATION:  Vital Signs: BP (!) 130/90   Ht 5' 7" (1.702 m)   Wt 92.4 kg (203 lb 11.3 oz)   BMI 31.90 kg/m²     Constitutional: no acute distress, comfortable, well appearing  HENT: no lid lag, no exophthalmos, no scleral icterus, moist mucous membranes  Neck: supple, trachea in midline, thyroid is soft and moves well with swallowing, multiple palpable nodules bilaterally, adequate neck extension  Heme/Lymph: no cervical or supraclavicular lymphadenopathy  Respiratory: normal respiratory effort, no wheezes or stridor  Cardiovascular: regular rate and rhythm  Extremities: no edema  Skin: warm and dry, no rashes  Neurologic: no gross " resting tremor of outstretched hands, voice strong  Vascular: radial pulses palpable bilaterally  Psychiatric: affect normal    IMAGING STUDIES:  I personally and independently reviewed, visualized and interpreted the images of the below listed radiology studies (including US from 06/2023) and my findings are notable for multinodular goiter with nodules bilaterally, there is a suspicious left inferior nodule that is quite large as well as a smaller nodule in the right lower pole that demonstrates suspicious features.  Reports below for reference.    US SOFT TISSUE HEAD NECK THYROID 06/01/2023  CLINICAL HISTORY:.  Nontoxic multinodular goiter  TECHNIQUE:Ultrasound of the thyroid and cervical lymph nodes was performed.  COMPARISON:12/15/2018     FINDINGS:  The thyroid is enlarged in size.  Right lobe of the thyroid measures 4.9 x 1.1 x 1.7 cm.  Thyroid isthmus measures 6.8 x 2.6 x 2.8 cm in AP dimension.  Left lobe of the thyroid measures 0.5 cm.  Heterogeneous echotexture thyroid parenchyma.  Index nodules as below:     Nodule #1   Size: 1.4 x 1.4 x 1.2 cm   Location: Right inferior lobe   Composition: Solid   Echogenicity: hypoechoic   Shape: Taller than wide   Margins: smooth   Echogenic foci: Punctate echogenic foci   Change: Was 1.3 x 1.4 x 1.3 in 2018   TI-RADS category:TR 5, meet ACR TI-RADS criteria for FNA     Nodule #2  Size: 3.2 x 2.2 x 2.0 cm  Location: Isthmus  Composition: Solid  Echogenicity: Isoechoic  Shape: Total than wide  Margins: smooth  Echogenic foci: none  Change: Was 3.1 x 1.3 x 1.9 cm in 2018  TI-RADS category:TR 4, meeting ACR TI-RADS criteria for FNA     Nodule #3  Size: 2.1 x 2.0 x 2.2 cm  Location: Left lobe midportion  Composition: Solid  Echogenicity: Isoechoic  Shape: wider-than-tall  Margins: smooth  Echogenic foci: none  Change: Not well delineated in 2018  TI-RADS category:TR 3, follow-up at 1, 3 and 5 year per ACR TI-RADS criteria     Nodule #4  Size: 4.1 x 3.8 x 3.2 cm  Location:  Left inferior lobe  Composition: Solid  Echogenicity: hypoechoic  Shape: Taller than wide  Margins: smooth  Echogenic foci: none  Change: Was 3.8 x 2.9 x 2.9 cm in 2018  TI-RADS category:TR 5, meet ACR TI-RADS criteria for FNA     Two additional right lobe subcentimeter hypoechoic nodules noted, 1 of them decreased in size compared to prior study.     No cervical lymphadenopathy.     Impression:  Bilateral and isthmus TR 4 to TR 5 nodules with 1 of them increased size compared to 2018.  Reported prior biopsy with no clear results.  Recommend repeat FNA.  Left lobe TR 3 nodule not well delineated in prior study, requiring follow-up at 1, 3 and 5 year per ACR TI-RADS criteria.      CYTOLOGY:   Final Pathologic Diagnosis   Date Value Ref Range Status   04/10/2024 (A)  Final    [facility] Thyroid Cytology Category: Follicular Lesion of Undetermined Significance (FLUS)     Comment:     Interp By Brooks Campbell M.D., Signed on 04/17/2024 at 10:52   12/15/2023 (A)  Final    Thyroid, left lobe, inferior, nodule, fine needle aspiration (thin prep and smear slides):    [facility] Thyroid Cytology Category: Atypia Undetermined Significance (AUS)    Specimen is comprised of follicular cell groups with mildly enlarged nuclei with scattered nuclear grooves present.  While these may represent reactive changes a more significant process can not be entirely excluded.  Clinical and radiographic   correlation recommended.       Comment:     Interp By ANDRADE Mejia MD, Signed on 12/19/2023 at 14:50           IMPRESSION:  I had the pleasure of seeing Mr. Leblanc in endocrine surgical consultation regarding his multinodular goiter w/ right inferior nodule returning FLUS.  I have discussed with Mr. Leblanc at length regarding the current surgical and non-surgical treatment options.  Based on the current clinical findings and a thorough discussion about the risks, benefits, and alternatives, the patient elected to  proceed with total thyroidectomy.     I have discussed in detail the meaning of this thyroid disease process.  I have discussed in detail the possible complications associated with thyroid surgery, which may include (but not limited to) hoarseness, recurrent laryngeal nerve injury, superior laryngeal nerve injury - temporary or permanent, hypocalcemia and hypoparathyroidism - temporary or permanent, neck hematoma, infection, scarring, death and imponderables.  I discussed the potential need for a staged completion thyroidectomy in the event of unexpected intraoperative findings or recurrent laryngeal nerve dysfunction and thus the initial surgery would be limited to a thyroid lobectomy.  The patient understands that thyroid hormone replacement will be necessary lifelong. Thyroid function will need to be monitored.     All questions were answered and the patient expressed understanding of all the risks, benefits and alternatives, and agreed to proceed with the plan despite the risks.      Problem List Items Addressed This Visit       Multinodular goiter (nontoxic) - Primary     Multiple thyroid nodules bilaterally, dominant left inferior nodule measures 4.1 cm and has had prior FLUS/AUS cytology and positive molecular markers conferring a 95% risk of malignancy.  I have recommended a total thyroidectomy.  As it is a HARLEY mutation, lymph node metastases would be rare and in 10% or fewer cases.      - OR for total thyroidectomy  - Will forgo lymph node mapping  - Suspicious right thyroid nodule, though FNA biopsy would not likely change surgical plan for a total thyroidectomy  - Return to the office for maddie operative education and to surgical consent          Other Visit Diagnoses       Thyroid cancer        Relevant Orders    Case Request Operating Room: THYROIDECTOMY Total (Completed)        Debbie Gu MD  Staff Surgeon  Endocrine Surgery  9/9/24

## 2024-09-08 NOTE — ASSESSMENT & PLAN NOTE
Multiple thyroid nodules bilaterally, dominant left inferior nodule measures 4.1 cm and has had prior FLUS/AUS cytology and positive molecular markers conferring a 95% risk of malignancy.  I have recommended a total thyroidectomy.  As it is a HARLEY mutation, lymph node metastases would be rare and in 10% or fewer cases.      - OR for total thyroidectomy  - Will forgo lymph node mapping  - Suspicious right thyroid nodule, though FNA biopsy would not likely change surgical plan for a total thyroidectomy  - Return to the office for maddie operative education and to surgical consent

## 2024-09-09 ENCOUNTER — OFFICE VISIT (OUTPATIENT)
Dept: SURGERY | Facility: CLINIC | Age: 49
End: 2024-09-09
Payer: COMMERCIAL

## 2024-09-09 VITALS
WEIGHT: 203.69 LBS | DIASTOLIC BLOOD PRESSURE: 90 MMHG | BODY MASS INDEX: 31.97 KG/M2 | SYSTOLIC BLOOD PRESSURE: 130 MMHG | HEIGHT: 67 IN

## 2024-09-09 DIAGNOSIS — C73 THYROID CANCER: ICD-10-CM

## 2024-09-09 DIAGNOSIS — E04.2 MULTINODULAR GOITER (NONTOXIC): Primary | ICD-10-CM

## 2024-09-09 PROCEDURE — 3080F DIAST BP >= 90 MM HG: CPT | Mod: CPTII,S$GLB,, | Performed by: STUDENT IN AN ORGANIZED HEALTH CARE EDUCATION/TRAINING PROGRAM

## 2024-09-09 PROCEDURE — 99205 OFFICE O/P NEW HI 60 MIN: CPT | Mod: S$GLB,,, | Performed by: STUDENT IN AN ORGANIZED HEALTH CARE EDUCATION/TRAINING PROGRAM

## 2024-09-09 PROCEDURE — 99999 PR PBB SHADOW E&M-EST. PATIENT-LVL IV: CPT | Mod: PBBFAC,,, | Performed by: STUDENT IN AN ORGANIZED HEALTH CARE EDUCATION/TRAINING PROGRAM

## 2024-09-09 PROCEDURE — 3008F BODY MASS INDEX DOCD: CPT | Mod: CPTII,S$GLB,, | Performed by: STUDENT IN AN ORGANIZED HEALTH CARE EDUCATION/TRAINING PROGRAM

## 2024-09-09 PROCEDURE — 3044F HG A1C LEVEL LT 7.0%: CPT | Mod: CPTII,S$GLB,, | Performed by: STUDENT IN AN ORGANIZED HEALTH CARE EDUCATION/TRAINING PROGRAM

## 2024-09-09 PROCEDURE — 1159F MED LIST DOCD IN RCRD: CPT | Mod: CPTII,S$GLB,, | Performed by: STUDENT IN AN ORGANIZED HEALTH CARE EDUCATION/TRAINING PROGRAM

## 2024-09-09 PROCEDURE — 4010F ACE/ARB THERAPY RXD/TAKEN: CPT | Mod: CPTII,S$GLB,, | Performed by: STUDENT IN AN ORGANIZED HEALTH CARE EDUCATION/TRAINING PROGRAM

## 2024-09-09 PROCEDURE — 3075F SYST BP GE 130 - 139MM HG: CPT | Mod: CPTII,S$GLB,, | Performed by: STUDENT IN AN ORGANIZED HEALTH CARE EDUCATION/TRAINING PROGRAM

## 2024-09-29 NOTE — H&P (VIEW-ONLY)
Endocrine Surgery History & Physical     REFERRING PROVIDER: Siomara Azar NP    REASON FOR VISIT: Multinodular thyroid goiter, dominant left inferior nodule with positive molecular markers    HPI: Talon Leblanc Jr. is a 49 y.o. male patient with a history notable for glaucoma, HTN, and prediabetes who presents in consultation for management of non toxic multinodular goiter. The thyroid nodule was found on 2008. He felt the nodule on his neck. He went to endocrinology at [facility] and reports he had nodule drained and lost to follow up. He saw Dr. Rolle in 2019. Had biopsies and lost to follow up. Most recently, underwent US in 2023 with some interval change (see below). Left inferior nodule was biopsied showing FLUS, molecular testing confers a 95% risk of malignancy.     Interval History:   The patient returns to the office today for a preoperative evaluation.  Patient denies any significant changes to the medical or surgical history and denies medication changes.  Denies recent hospitalizations, emergency or urgent care visits, or illnesses.  We reviewed the expected perioperative course and all of the patient's questions were answered to their satisfaction.       Details of the workup are as follows:     Recent laboratory studies:  TSH: 0.989  Free T4: 0.90   Thyroid antibodies: not obtained  Vitamin D: 25 in 2011    Thyroid and Neck Imaging:  Most recent thyroid ultrasound: 4/2023  Dominant thyroid nodules:   Right inferior, 2.4 cm, TR5  Isthmus, 3.2cm, TR4  Left mid lobe, 2.2 cm, TR3  Left inferior, 4.1 cm, TR5  Interval changes from prior ultrasound: interval growth   Lymph node mapping: no  Neck or chest CT: no    Cytology:  FNA biopsy of left inferior, 4.1 cm, TR5  Cytology: [place] III, FLUS and AUS previously  Molecular testing: positive for NRAS Q61R and positive ThyraMir V2    Medications:  Levothyroxine: no  Vitamin D supplementation: no  Lithium, biotin, amiodarone, iodinated contrast:  none  Calcium supplements or calcimetrics: none    Symptoms:   Thyroid symptoms: denies overt hyperthyroid or hypothyroid symptoms  Parathyroid screening: calcium levels normal, denies nephrolithiasis, fracture, muscle/joint pain, abdominal pain/cramping, brain fog, concentration difficulties  Compressive symptoms: denies dysphagia, globus sensation, compression symptoms, or anterior neck pain.    Voice symptoms: denies hoarseness, voice changes or increased need to clear the throat.    Surgical risk factors:  Risk of concurrent parathyroid disease low  History of neck radiation: none  Prior neck surgery: none  Functional status: can tolerate >4 METS, walks 1-2 miles per day  Cardiovascular risk: stress echo 2021 normal   Antiplatelet therapy and anticoagulation: no  No prior gastric surgery, no IBS/IBD    Bone Health:  DEXA scan on: none    Family history:  No family history of endocrinopathies or endocrine cancers including thyroid diease, thyroid cancer, parathyroid disease, hypercalcemia.      LABORATORY STUDIES:  I personally and independently reviewed relevant lab test results, including the following:             PAST MEDICAL HISTORY:  Patient Active Problem List   Diagnosis    Bilateral carpal tunnel syndrome    Multinodular goiter (nontoxic)    Prediabetes    Dyslipidemia        PAST SURGICAL HISTORY:  Past Surgical History:   Procedure Laterality Date    CARPAL TUNNEL RELEASE Right 5/18/2022    Procedure: RELEASE, CARPAL TUNNEL,RIGHT;  Surgeon: Elicia Mckinney MD;  Location: [facility];  Service: Orthopedics;  Laterality: Right;    CARPAL TUNNEL RELEASE Left 8/19/2022    Procedure: RELEASE, CARPAL TUNNEL,LEFT;  Surgeon: Elicia Mckinney MD;  Location: [facility];  Service: Orthopedics;  Laterality: Left;    COLONOSCOPY N/A 12/5/2023    Procedure: COLONOSCOPY;  Surgeon: Americo Vieira MD;  Location: [facility] ENDO ([address] [place] [address]);  Service: Colon and Rectal;  Laterality: N/A;  suprep-portal-m  "[facility]-tb  11/28-lvm for precall-MS  12/4-pt confirmed appt-KPvt    FRACTURE SURGERY      r femur fx w/ tatyana Right 2017        MEDICATIONS:  Current Outpatient Medications   Medication Sig Dispense Refill    latanoprost 0.005 % ophthalmic solution Inject 2 drops into the eye Daily.      losartan (COZAAR) 25 MG tablet Take 1 tablet (25 mg total) by mouth once daily. 90 tablet 3    rosuvastatin (CRESTOR) 20 MG tablet Take 1 tablet (20 mg total) by mouth once daily. 90 tablet 3    ibuprofen (ADVIL,MOTRIN) 800 MG tablet Take 1 tablet (800 mg total) by mouth every 6 (six) hours as needed for Pain. (Patient not taking: Reported on 9/30/2024) 20 tablet 0     No current facility-administered medications for this visit.     Facility-Administered Medications Ordered in Other Visits   Medication Dose Route Frequency Provider Last Rate Last Admin    0.9%  NaCl infusion   Intravenous Continuous Serafin Avina MD 10 mL/hr at 08/19/22 0830 New Bag at 08/19/22 0830    LIDOcaine (PF) 10 mg/ml (1%) injection 10 mg  1 mL Intradermal Once Serafin Avina MD        mupirocin 2 % ointment   Nasal On Call Procedure Sharon Tirado MD   Given at 08/19/22 0830       ALLERGIES:  Review of patient's allergies indicates:  No Known Allergies    SOCIAL HISTORY:  Social History     Tobacco Use    Smoking status: Never    Smokeless tobacco: Never   Substance Use Topics    Alcohol use: No    Drug use: No        FAMILY HISTORY:  Family History   Problem Relation Name Age of Onset    Thyroid nodules Mother      Cancer Father          colon caner    Thyroid cancer Neg Hx           REVIEW OF SYSTEMS:  A detailed review of systems was reviewed with the patient, pertinent positives and negatives are presented in the note and is otherwise negative.    PHYSICAL EXAMINATION:  Vital Signs: BP (!) 160/80   Ht 5' 7" (1.702 m)   Wt 90.7 kg (199 lb 15.3 oz)   BMI 31.32 kg/m²     Constitutional: no acute distress, comfortable, well appearing  HENT: no " lid lag, no exophthalmos, no scleral icterus, moist mucous membranes  Neck: supple, trachea in midline, thyroid is soft and moves well with swallowing, multiple palpable nodules bilaterally, adequate neck extension  Heme/Lymph: no cervical or supraclavicular lymphadenopathy  Respiratory: normal respiratory effort, no wheezes or stridor  Cardiovascular: regular rate and rhythm  Extremities: no edema  Skin: warm and dry, no rashes  Neurologic: no gross resting tremor of outstretched hands, voice strong  Vascular: radial pulses palpable bilaterally  Psychiatric: affect normal    IMAGING STUDIES:  I personally and independently reviewed, visualized and interpreted the images of the below listed radiology studies (including US from 06/2023) and my findings are notable for multinodular goiter with nodules bilaterally, there is a suspicious left inferior nodule that is quite large as well as a smaller nodule in the right lower pole that demonstrates suspicious features.  Reports below for reference.    US SOFT TISSUE HEAD NECK THYROID 06/01/2023  CLINICAL HISTORY:.  Nontoxic multinodular goiter  TECHNIQUE:Ultrasound of the thyroid and cervical lymph nodes was performed.  COMPARISON:12/15/2018     FINDINGS:  The thyroid is enlarged in size.  Right lobe of the thyroid measures 4.9 x 1.1 x 1.7 cm.  Thyroid isthmus measures 6.8 x 2.6 x 2.8 cm in AP dimension.  Left lobe of the thyroid measures 0.5 cm.  Heterogeneous echotexture thyroid parenchyma.  Index nodules as below:     Nodule #1   Size: 1.4 x 1.4 x 1.2 cm   Location: Right inferior lobe   Composition: Solid   Echogenicity: hypoechoic   Shape: Taller than wide   Margins: smooth   Echogenic foci: Punctate echogenic foci   Change: Was 1.3 x 1.4 x 1.3 in 2018   TI-RADS category:TR 5, meet ACR TI-RADS criteria for FNA     Nodule #2  Size: 3.2 x 2.2 x 2.0 cm  Location: Isthmus  Composition: Solid  Echogenicity: Isoechoic  Shape: Total than wide  Margins: smooth  Echogenic  foci: none  Change: Was 3.1 x 1.3 x 1.9 cm in 2018  TI-RADS category:TR 4, meeting ACR TI-RADS criteria for FNA     Nodule #3  Size: 2.1 x 2.0 x 2.2 cm  Location: Left lobe midportion  Composition: Solid  Echogenicity: Isoechoic  Shape: wider-than-tall  Margins: smooth  Echogenic foci: none  Change: Not well delineated in 2018  TI-RADS category:TR 3, follow-up at 1, 3 and 5 year per ACR TI-RADS criteria     Nodule #4  Size: 4.1 x 3.8 x 3.2 cm  Location: Left inferior lobe  Composition: Solid  Echogenicity: hypoechoic  Shape: Taller than wide  Margins: smooth  Echogenic foci: none  Change: Was 3.8 x 2.9 x 2.9 cm in 2018  TI-RADS category:TR 5, meet ACR TI-RADS criteria for FNA     Two additional right lobe subcentimeter hypoechoic nodules noted, 1 of them decreased in size compared to prior study.     No cervical lymphadenopathy.     Impression:  Bilateral and isthmus TR 4 to TR 5 nodules with 1 of them increased size compared to 2018.  Reported prior biopsy with no clear results.  Recommend repeat FNA.  Left lobe TR 3 nodule not well delineated in prior study, requiring follow-up at 1, 3 and 5 year per ACR TI-RADS criteria.      CYTOLOGY:   Final Pathologic Diagnosis   Date Value Ref Range Status   04/10/2024 (A)  Final    [facility] Thyroid Cytology Category: Follicular Lesion of Undetermined Significance (FLUS)     Comment:     Interp By Brooks Campbell M.D., Signed on 04/17/2024 at 10:52   12/15/2023 (A)  Final    Thyroid, left lobe, inferior, nodule, fine needle aspiration (thin prep and smear slides):    [facility] Thyroid Cytology Category: [place] Undetermined Significance (AUS)    Specimen is comprised of follicular cell groups with mildly enlarged nuclei with scattered nuclear grooves present.  While these may represent reactive changes a more significant process can not be entirely excluded.  Clinical and radiographic   correlation recommended.       Comment:     Interp By ANDRADE Mejia MD,  Signed on 12/19/2023 at 14:50           IMPRESSION:  I had the pleasure of seeing Mr. Leblanc in endocrine surgical consultation regarding his multinodular goiter w/ right inferior nodule returning FLUS.  I have discussed with Mr. Leblanc at length regarding the current surgical and non-surgical treatment options.  Based on the current clinical findings and a thorough discussion about the risks, benefits, and alternatives, the patient elected to proceed with total thyroidectomy.     I have discussed in detail the meaning of this thyroid disease process.  I have discussed in detail the possible complications associated with thyroid surgery, which may include (but not limited to) hoarseness, recurrent laryngeal nerve injury, superior laryngeal nerve injury - temporary or permanent, hypocalcemia and hypoparathyroidism - temporary or permanent, neck hematoma, infection, scarring, death and imponderables.  I discussed the potential need for a staged completion thyroidectomy in the event of unexpected intraoperative findings or recurrent laryngeal nerve dysfunction and thus the initial surgery would be limited to a thyroid lobectomy.  The patient understands that thyroid hormone replacement will be necessary lifelong. Thyroid function will need to be monitored.     All questions were answered and the patient expressed understanding of all the risks, benefits and alternatives, and agreed to proceed with the plan despite the risks.      Problem List Items Addressed This Visit       Multinodular goiter (nontoxic) - Primary     Multiple thyroid nodules bilaterally, dominant left inferior nodule measures 4.1 cm and has had prior FLUS/AUS cytology and positive molecular markers conferring a 95% risk of malignancy.  I have recommended a total thyroidectomy.  As it is a HARLEY mutation, lymph node metastases would be rare and in 10% or fewer cases.      - Scheduled for surgery, proceed to OR for total thyroidectomy  - Preoperative  education completed  - Surgical consent was signed and witnessed  - Reviewed need for postoperative thyroid hormone and calcium supplementation  - Recent labs reviewed           Debbie Gu MD  Staff Surgeon  Endocrine Surgery  9/30/24

## 2024-09-29 NOTE — PROGRESS NOTES
Endocrine Surgery History & Physical     REFERRING PROVIDER: Siomara Azar NP    REASON FOR VISIT: Multinodular thyroid goiter, dominant left inferior nodule with positive molecular markers    HPI: Talon Leblanc Jr. is a 49 y.o. male patient with a history notable for glaucoma, HTN, and prediabetes who presents in consultation for management of non toxic multinodular goiter. The thyroid nodule was found on 2008. He felt the nodule on his neck. He went to endocrinology at [facility] and reports he had nodule drained and lost to follow up. He saw Dr. Rolle in 2019. Had biopsies and lost to follow up. Most recently, underwent US in 2023 with some interval change (see below). Left inferior nodule was biopsied showing FLUS, molecular testing confers a 95% risk of malignancy.     Interval History:   The patient returns to the office today for a preoperative evaluation.  Patient denies any significant changes to the medical or surgical history and denies medication changes.  Denies recent hospitalizations, emergency or urgent care visits, or illnesses.  We reviewed the expected perioperative course and all of the patient's questions were answered to their satisfaction.       Details of the workup are as follows:     Recent laboratory studies:  TSH: 0.989  Free T4: 0.90   Thyroid antibodies: not obtained  Vitamin D: 25 in 2011    Thyroid and Neck Imaging:  Most recent thyroid ultrasound: 4/2023  Dominant thyroid nodules:   Right inferior, 2.4 cm, TR5  Isthmus, 3.2cm, TR4  Left mid lobe, 2.2 cm, TR3  Left inferior, 4.1 cm, TR5  Interval changes from prior ultrasound: interval growth   Lymph node mapping: no  Neck or chest CT: no    Cytology:  FNA biopsy of left inferior, 4.1 cm, TR5  Cytology: [place] III, FLUS and AUS previously  Molecular testing: positive for NRAS Q61R and positive ThyraMir V2    Medications:  Levothyroxine: no  Vitamin D supplementation: no  Lithium, biotin, amiodarone, iodinated contrast:  none  Calcium supplements or calcimetrics: none    Symptoms:   Thyroid symptoms: denies overt hyperthyroid or hypothyroid symptoms  Parathyroid screening: calcium levels normal, denies nephrolithiasis, fracture, muscle/joint pain, abdominal pain/cramping, brain fog, concentration difficulties  Compressive symptoms: denies dysphagia, globus sensation, compression symptoms, or anterior neck pain.    Voice symptoms: denies hoarseness, voice changes or increased need to clear the throat.    Surgical risk factors:  Risk of concurrent parathyroid disease low  History of neck radiation: none  Prior neck surgery: none  Functional status: can tolerate >4 METS, walks 1-2 miles per day  Cardiovascular risk: stress echo 2021 normal   Antiplatelet therapy and anticoagulation: no  No prior gastric surgery, no IBS/IBD    Bone Health:  DEXA scan on: none    Family history:  No family history of endocrinopathies or endocrine cancers including thyroid diease, thyroid cancer, parathyroid disease, hypercalcemia.      LABORATORY STUDIES:  I personally and independently reviewed relevant lab test results, including the following:             PAST MEDICAL HISTORY:  Patient Active Problem List   Diagnosis    Bilateral carpal tunnel syndrome    Multinodular goiter (nontoxic)    Prediabetes    Dyslipidemia        PAST SURGICAL HISTORY:  Past Surgical History:   Procedure Laterality Date    CARPAL TUNNEL RELEASE Right 5/18/2022    Procedure: RELEASE, CARPAL TUNNEL,RIGHT;  Surgeon: Elicia Mckinney MD;  Location: [facility];  Service: Orthopedics;  Laterality: Right;    CARPAL TUNNEL RELEASE Left 8/19/2022    Procedure: RELEASE, CARPAL TUNNEL,LEFT;  Surgeon: Elicia Mckinney MD;  Location: [facility];  Service: Orthopedics;  Laterality: Left;    COLONOSCOPY N/A 12/5/2023    Procedure: COLONOSCOPY;  Surgeon: Americo Vieira MD;  Location: [facility] ENDO ([address] [place] [address]);  Service: Colon and Rectal;  Laterality: N/A;  suprep-portal-m  "[facility]-tb  11/28-lvm for precall-MS  12/4-pt confirmed appt-KPvt    FRACTURE SURGERY      r femur fx w/ tatyana Right 2017        MEDICATIONS:  Current Outpatient Medications   Medication Sig Dispense Refill    latanoprost 0.005 % ophthalmic solution Inject 2 drops into the eye Daily.      losartan (COZAAR) 25 MG tablet Take 1 tablet (25 mg total) by mouth once daily. 90 tablet 3    rosuvastatin (CRESTOR) 20 MG tablet Take 1 tablet (20 mg total) by mouth once daily. 90 tablet 3    ibuprofen (ADVIL,MOTRIN) 800 MG tablet Take 1 tablet (800 mg total) by mouth every 6 (six) hours as needed for Pain. (Patient not taking: Reported on 9/30/2024) 20 tablet 0     No current facility-administered medications for this visit.     Facility-Administered Medications Ordered in Other Visits   Medication Dose Route Frequency Provider Last Rate Last Admin    0.9%  NaCl infusion   Intravenous Continuous Serafin Avina MD 10 mL/hr at 08/19/22 0830 New Bag at 08/19/22 0830    LIDOcaine (PF) 10 mg/ml (1%) injection 10 mg  1 mL Intradermal Once Serafin Avina MD        mupirocin 2 % ointment   Nasal On Call Procedure Sharon Tirado MD   Given at 08/19/22 0830       ALLERGIES:  Review of patient's allergies indicates:  No Known Allergies    SOCIAL HISTORY:  Social History     Tobacco Use    Smoking status: Never    Smokeless tobacco: Never   Substance Use Topics    Alcohol use: No    Drug use: No        FAMILY HISTORY:  Family History   Problem Relation Name Age of Onset    Thyroid nodules Mother      Cancer Father          colon caner    Thyroid cancer Neg Hx           REVIEW OF SYSTEMS:  A detailed review of systems was reviewed with the patient, pertinent positives and negatives are presented in the note and is otherwise negative.    PHYSICAL EXAMINATION:  Vital Signs: BP (!) 160/80   Ht 5' 7" (1.702 m)   Wt 90.7 kg (199 lb 15.3 oz)   BMI 31.32 kg/m²     Constitutional: no acute distress, comfortable, well appearing  HENT: no " lid lag, no exophthalmos, no scleral icterus, moist mucous membranes  Neck: supple, trachea in midline, thyroid is soft and moves well with swallowing, multiple palpable nodules bilaterally, adequate neck extension  Heme/Lymph: no cervical or supraclavicular lymphadenopathy  Respiratory: normal respiratory effort, no wheezes or stridor  Cardiovascular: regular rate and rhythm  Extremities: no edema  Skin: warm and dry, no rashes  Neurologic: no gross resting tremor of outstretched hands, voice strong  Vascular: radial pulses palpable bilaterally  Psychiatric: affect normal    IMAGING STUDIES:  I personally and independently reviewed, visualized and interpreted the images of the below listed radiology studies (including US from 06/2023) and my findings are notable for multinodular goiter with nodules bilaterally, there is a suspicious left inferior nodule that is quite large as well as a smaller nodule in the right lower pole that demonstrates suspicious features.  Reports below for reference.    US SOFT TISSUE HEAD NECK THYROID 06/01/2023  CLINICAL HISTORY:.  Nontoxic multinodular goiter  TECHNIQUE:Ultrasound of the thyroid and cervical lymph nodes was performed.  COMPARISON:12/15/2018     FINDINGS:  The thyroid is enlarged in size.  Right lobe of the thyroid measures 4.9 x 1.1 x 1.7 cm.  Thyroid isthmus measures 6.8 x 2.6 x 2.8 cm in AP dimension.  Left lobe of the thyroid measures 0.5 cm.  Heterogeneous echotexture thyroid parenchyma.  Index nodules as below:     Nodule #1   Size: 1.4 x 1.4 x 1.2 cm   Location: Right inferior lobe   Composition: Solid   Echogenicity: hypoechoic   Shape: Taller than wide   Margins: smooth   Echogenic foci: Punctate echogenic foci   Change: Was 1.3 x 1.4 x 1.3 in 2018   TI-RADS category:TR 5, meet ACR TI-RADS criteria for FNA     Nodule #2  Size: 3.2 x 2.2 x 2.0 cm  Location: Isthmus  Composition: Solid  Echogenicity: Isoechoic  Shape: Total than wide  Margins: smooth  Echogenic  foci: none  Change: Was 3.1 x 1.3 x 1.9 cm in 2018  TI-RADS category:TR 4, meeting ACR TI-RADS criteria for FNA     Nodule #3  Size: 2.1 x 2.0 x 2.2 cm  Location: Left lobe midportion  Composition: Solid  Echogenicity: Isoechoic  Shape: wider-than-tall  Margins: smooth  Echogenic foci: none  Change: Not well delineated in 2018  TI-RADS category:TR 3, follow-up at 1, 3 and 5 year per ACR TI-RADS criteria     Nodule #4  Size: 4.1 x 3.8 x 3.2 cm  Location: Left inferior lobe  Composition: Solid  Echogenicity: hypoechoic  Shape: Taller than wide  Margins: smooth  Echogenic foci: none  Change: Was 3.8 x 2.9 x 2.9 cm in 2018  TI-RADS category:TR 5, meet ACR TI-RADS criteria for FNA     Two additional right lobe subcentimeter hypoechoic nodules noted, 1 of them decreased in size compared to prior study.     No cervical lymphadenopathy.     Impression:  Bilateral and isthmus TR 4 to TR 5 nodules with 1 of them increased size compared to 2018.  Reported prior biopsy with no clear results.  Recommend repeat FNA.  Left lobe TR 3 nodule not well delineated in prior study, requiring follow-up at 1, 3 and 5 year per ACR TI-RADS criteria.      CYTOLOGY:   Final Pathologic Diagnosis   Date Value Ref Range Status   04/10/2024 (A)  Final    [facility] Thyroid Cytology Category: Follicular Lesion of Undetermined Significance (FLUS)     Comment:     Interp By Brooks Campbell M.D., Signed on 04/17/2024 at 10:52   12/15/2023 (A)  Final    Thyroid, left lobe, inferior, nodule, fine needle aspiration (thin prep and smear slides):    [facility] Thyroid Cytology Category: Atypia Undetermined Significance (AUS)    Specimen is comprised of follicular cell groups with mildly enlarged nuclei with scattered nuclear grooves present.  While these may represent reactive changes a more significant process can not be entirely excluded.  Clinical and radiographic   correlation recommended.       Comment:     Interp By ANDRADE Mejia MD,  Signed on 12/19/2023 at 14:50           IMPRESSION:  I had the pleasure of seeing Mr. Leblanc in endocrine surgical consultation regarding his multinodular goiter w/ right inferior nodule returning FLUS.  I have discussed with Mr. Leblanc at length regarding the current surgical and non-surgical treatment options.  Based on the current clinical findings and a thorough discussion about the risks, benefits, and alternatives, the patient elected to proceed with total thyroidectomy.     I have discussed in detail the meaning of this thyroid disease process.  I have discussed in detail the possible complications associated with thyroid surgery, which may include (but not limited to) hoarseness, recurrent laryngeal nerve injury, superior laryngeal nerve injury - temporary or permanent, hypocalcemia and hypoparathyroidism - temporary or permanent, neck hematoma, infection, scarring, death and imponderables.  I discussed the potential need for a staged completion thyroidectomy in the event of unexpected intraoperative findings or recurrent laryngeal nerve dysfunction and thus the initial surgery would be limited to a thyroid lobectomy.  The patient understands that thyroid hormone replacement will be necessary lifelong. Thyroid function will need to be monitored.     All questions were answered and the patient expressed understanding of all the risks, benefits and alternatives, and agreed to proceed with the plan despite the risks.      Problem List Items Addressed This Visit       Multinodular goiter (nontoxic) - Primary     Multiple thyroid nodules bilaterally, dominant left inferior nodule measures 4.1 cm and has had prior FLUS/AUS cytology and positive molecular markers conferring a 95% risk of malignancy.  I have recommended a total thyroidectomy.  As it is a HARLEY mutation, lymph node metastases would be rare and in 10% or fewer cases.      - Scheduled for surgery, proceed to OR for total thyroidectomy  - Preoperative  education completed  - Surgical consent was signed and witnessed  - Reviewed need for postoperative thyroid hormone and calcium supplementation  - Recent labs reviewed           Debbie Gu MD  Staff Surgeon  Endocrine Surgery  9/30/24

## 2024-09-29 NOTE — ASSESSMENT & PLAN NOTE
Multiple thyroid nodules bilaterally, dominant left inferior nodule measures 4.1 cm and has had prior FLUS/AUS cytology and positive molecular markers conferring a 95% risk of malignancy.  I have recommended a total thyroidectomy.  As it is a HARLEY mutation, lymph node metastases would be rare and in 10% or fewer cases.      - Scheduled for surgery, proceed to OR for total thyroidectomy  - Preoperative education completed  - Surgical consent was signed and witnessed  - Reviewed need for postoperative thyroid hormone and calcium supplementation  - Recent labs reviewed

## 2024-09-30 ENCOUNTER — OFFICE VISIT (OUTPATIENT)
Dept: SURGERY | Facility: CLINIC | Age: 49
End: 2024-09-30
Payer: COMMERCIAL

## 2024-09-30 VITALS
HEIGHT: 67 IN | BODY MASS INDEX: 31.38 KG/M2 | DIASTOLIC BLOOD PRESSURE: 80 MMHG | SYSTOLIC BLOOD PRESSURE: 160 MMHG | WEIGHT: 199.94 LBS

## 2024-09-30 DIAGNOSIS — E04.2 MULTINODULAR GOITER (NONTOXIC): Primary | ICD-10-CM

## 2024-09-30 PROCEDURE — 99999 PR PBB SHADOW E&M-EST. PATIENT-LVL III: CPT | Mod: PBBFAC,,, | Performed by: STUDENT IN AN ORGANIZED HEALTH CARE EDUCATION/TRAINING PROGRAM

## 2024-09-30 PROCEDURE — 99499 UNLISTED E&M SERVICE: CPT | Mod: S$GLB,,, | Performed by: STUDENT IN AN ORGANIZED HEALTH CARE EDUCATION/TRAINING PROGRAM

## 2024-10-01 ENCOUNTER — PATIENT MESSAGE (OUTPATIENT)
Dept: SURGERY | Facility: CLINIC | Age: 49
End: 2024-10-01
Payer: COMMERCIAL

## 2024-10-10 ENCOUNTER — TELEPHONE (OUTPATIENT)
Dept: SURGERY | Facility: CLINIC | Age: 49
End: 2024-10-10
Payer: COMMERCIAL

## 2024-10-10 ENCOUNTER — ANESTHESIA EVENT (OUTPATIENT)
Dept: SURGERY | Facility: HOSPITAL | Age: 49
End: 2024-10-10
Payer: COMMERCIAL

## 2024-10-10 DIAGNOSIS — E89.0 S/P TOTAL THYROIDECTOMY: Primary | ICD-10-CM

## 2024-10-11 ENCOUNTER — ANESTHESIA (OUTPATIENT)
Dept: SURGERY | Facility: HOSPITAL | Age: 49
End: 2024-10-11
Payer: COMMERCIAL

## 2024-10-11 ENCOUNTER — HOSPITAL ENCOUNTER (OUTPATIENT)
Facility: HOSPITAL | Age: 49
Discharge: HOME | End: 2024-10-11
Attending: STUDENT IN AN ORGANIZED HEALTH CARE EDUCATION/TRAINING PROGRAM | Admitting: STUDENT IN AN ORGANIZED HEALTH CARE EDUCATION/TRAINING PROGRAM
Payer: COMMERCIAL

## 2024-10-11 VITALS
OXYGEN SATURATION: 100 % | BODY MASS INDEX: 29.82 KG/M2 | TEMPERATURE: 98 F | RESPIRATION RATE: 25 BRPM | SYSTOLIC BLOOD PRESSURE: 159 MMHG | DIASTOLIC BLOOD PRESSURE: 89 MMHG | HEIGHT: 67 IN | WEIGHT: 190 LBS | HEART RATE: 68 BPM

## 2024-10-11 DIAGNOSIS — E04.2 MULTINODULAR GOITER: ICD-10-CM

## 2024-10-11 LAB
POCT GLUCOSE: 121 MG/DL (ref 70–110)
PTH-INTACT SERPL-MCNC: 117.8 PG/ML (ref 9–77)

## 2024-10-11 PROCEDURE — 71000015 HC POSTOP RECOV 1ST HR: Performed by: STUDENT IN AN ORGANIZED HEALTH CARE EDUCATION/TRAINING PROGRAM

## 2024-10-11 PROCEDURE — 25000003 PHARM REV CODE 250: Performed by: STUDENT IN AN ORGANIZED HEALTH CARE EDUCATION/TRAINING PROGRAM

## 2024-10-11 PROCEDURE — 71000033 HC RECOVERY, INTIAL HOUR: Performed by: STUDENT IN AN ORGANIZED HEALTH CARE EDUCATION/TRAINING PROGRAM

## 2024-10-11 PROCEDURE — 63600175 PHARM REV CODE 636 W HCPCS: Performed by: STUDENT IN AN ORGANIZED HEALTH CARE EDUCATION/TRAINING PROGRAM

## 2024-10-11 PROCEDURE — 83970 ASSAY OF PARATHORMONE: CPT

## 2024-10-11 PROCEDURE — 36000707: Performed by: STUDENT IN AN ORGANIZED HEALTH CARE EDUCATION/TRAINING PROGRAM

## 2024-10-11 PROCEDURE — 60271 REMOVAL OF THYROID: CPT | Mod: ,,, | Performed by: STUDENT IN AN ORGANIZED HEALTH CARE EDUCATION/TRAINING PROGRAM

## 2024-10-11 PROCEDURE — 36000706: Performed by: STUDENT IN AN ORGANIZED HEALTH CARE EDUCATION/TRAINING PROGRAM

## 2024-10-11 PROCEDURE — 71000044 HC DOSC ROUTINE RECOVERY FIRST HOUR: Performed by: STUDENT IN AN ORGANIZED HEALTH CARE EDUCATION/TRAINING PROGRAM

## 2024-10-11 PROCEDURE — 63600175 PHARM REV CODE 636 W HCPCS: Mod: JZ,JG | Performed by: STUDENT IN AN ORGANIZED HEALTH CARE EDUCATION/TRAINING PROGRAM

## 2024-10-11 PROCEDURE — 37000009 HC ANESTHESIA EA ADD 15 MINS: Performed by: STUDENT IN AN ORGANIZED HEALTH CARE EDUCATION/TRAINING PROGRAM

## 2024-10-11 PROCEDURE — 37000008 HC ANESTHESIA 1ST 15 MINUTES: Performed by: STUDENT IN AN ORGANIZED HEALTH CARE EDUCATION/TRAINING PROGRAM

## 2024-10-11 PROCEDURE — 71000016 HC POSTOP RECOV ADDL HR: Performed by: STUDENT IN AN ORGANIZED HEALTH CARE EDUCATION/TRAINING PROGRAM

## 2024-10-11 PROCEDURE — 82962 GLUCOSE BLOOD TEST: CPT | Performed by: STUDENT IN AN ORGANIZED HEALTH CARE EDUCATION/TRAINING PROGRAM

## 2024-10-11 RX ORDER — OXYCODONE HYDROCHLORIDE 5 MG/1
5 TABLET ORAL
Status: DISCONTINUED | OUTPATIENT
Start: 2024-10-11 | End: 2024-10-11 | Stop reason: HOSPADM

## 2024-10-11 RX ORDER — OXYCODONE HYDROCHLORIDE 5 MG/1
5 TABLET ORAL EVERY 6 HOURS PRN
Qty: 5 TABLET | Refills: 0 | Status: SHIPPED | OUTPATIENT
Start: 2024-10-11 | End: 2024-10-24

## 2024-10-11 RX ORDER — SODIUM CHLORIDE 0.9 % (FLUSH) 0.9 %
10 SYRINGE (ML) INJECTION
Status: DISCONTINUED | OUTPATIENT
Start: 2024-10-11 | End: 2024-10-11 | Stop reason: HOSPADM

## 2024-10-11 RX ORDER — LIDOCAINE HYDROCHLORIDE 10 MG/ML
1 INJECTION, SOLUTION EPIDURAL; INFILTRATION; INTRACAUDAL; PERINEURAL ONCE
Status: DISCONTINUED | OUTPATIENT
Start: 2024-10-11 | End: 2024-10-11 | Stop reason: HOSPADM

## 2024-10-11 RX ORDER — SUCCINYLCHOLINE CHLORIDE 20 MG/ML
INJECTION INTRAMUSCULAR; INTRAVENOUS
Status: DISCONTINUED | OUTPATIENT
Start: 2024-10-11 | End: 2024-10-11

## 2024-10-11 RX ORDER — PROPOFOL 10 MG/ML
VIAL (ML) INTRAVENOUS
Status: DISCONTINUED | OUTPATIENT
Start: 2024-10-11 | End: 2024-10-11

## 2024-10-11 RX ORDER — FENTANYL CITRATE 50 UG/ML
INJECTION, SOLUTION INTRAMUSCULAR; INTRAVENOUS
Status: DISCONTINUED | OUTPATIENT
Start: 2024-10-11 | End: 2024-10-11

## 2024-10-11 RX ORDER — VASOPRESSIN 20 [USP'U]/ML
INJECTION, SOLUTION INTRAMUSCULAR; SUBCUTANEOUS
Status: DISCONTINUED | OUTPATIENT
Start: 2024-10-11 | End: 2024-10-11

## 2024-10-11 RX ORDER — DEXAMETHASONE SODIUM PHOSPHATE 4 MG/ML
INJECTION, SOLUTION INTRA-ARTICULAR; INTRALESIONAL; INTRAMUSCULAR; INTRAVENOUS; SOFT TISSUE
Status: DISCONTINUED | OUTPATIENT
Start: 2024-10-11 | End: 2024-10-11

## 2024-10-11 RX ORDER — LIDOCAINE HYDROCHLORIDE 20 MG/ML
INJECTION, SOLUTION EPIDURAL; INFILTRATION; INTRACAUDAL; PERINEURAL
Status: DISCONTINUED | OUTPATIENT
Start: 2024-10-11 | End: 2024-10-11

## 2024-10-11 RX ORDER — ROCURONIUM BROMIDE 10 MG/ML
INJECTION, SOLUTION INTRAVENOUS
Status: DISCONTINUED | OUTPATIENT
Start: 2024-10-11 | End: 2024-10-11

## 2024-10-11 RX ORDER — CALCIUM CARBONATE 400(1000)
2 TABLET,CHEWABLE ORAL 2 TIMES DAILY WITH MEALS
COMMUNITY
Start: 2024-10-11 | End: 2024-10-24

## 2024-10-11 RX ORDER — ACETAMINOPHEN 500 MG
1000 TABLET ORAL ONCE
Status: COMPLETED | OUTPATIENT
Start: 2024-10-11 | End: 2024-10-11

## 2024-10-11 RX ORDER — BUPIVACAINE HYDROCHLORIDE 2.5 MG/ML
INJECTION, SOLUTION EPIDURAL; INFILTRATION; INTRACAUDAL
Status: DISCONTINUED | OUTPATIENT
Start: 2024-10-11 | End: 2024-10-11 | Stop reason: HOSPADM

## 2024-10-11 RX ORDER — FENTANYL CITRATE 50 UG/ML
25 INJECTION, SOLUTION INTRAMUSCULAR; INTRAVENOUS EVERY 5 MIN PRN
Status: COMPLETED | OUTPATIENT
Start: 2024-10-11 | End: 2024-10-11

## 2024-10-11 RX ORDER — HALOPERIDOL 5 MG/ML
0.5 INJECTION INTRAMUSCULAR EVERY 10 MIN PRN
Status: DISCONTINUED | OUTPATIENT
Start: 2024-10-11 | End: 2024-10-11 | Stop reason: HOSPADM

## 2024-10-11 RX ORDER — LEVOTHYROXINE SODIUM 137 UG/1
137 TABLET ORAL
Qty: 30 TABLET | Refills: 11 | Status: SHIPPED | OUTPATIENT
Start: 2024-10-11 | End: 2025-10-11

## 2024-10-11 RX ORDER — PHENYLEPHRINE HYDROCHLORIDE 10 MG/ML
INJECTION INTRAVENOUS
Status: DISCONTINUED | OUTPATIENT
Start: 2024-10-11 | End: 2024-10-11

## 2024-10-11 RX ORDER — GLUCAGON 1 MG
1 KIT INJECTION
Status: DISCONTINUED | OUTPATIENT
Start: 2024-10-11 | End: 2024-10-11 | Stop reason: HOSPADM

## 2024-10-11 RX ORDER — ONDANSETRON HYDROCHLORIDE 2 MG/ML
INJECTION, SOLUTION INTRAVENOUS
Status: DISCONTINUED | OUTPATIENT
Start: 2024-10-11 | End: 2024-10-11

## 2024-10-11 RX ORDER — MIDAZOLAM HYDROCHLORIDE 1 MG/ML
INJECTION INTRAMUSCULAR; INTRAVENOUS
Status: DISCONTINUED | OUTPATIENT
Start: 2024-10-11 | End: 2024-10-11

## 2024-10-11 RX ADMIN — PROPOFOL 30 MG: 10 INJECTION, EMULSION INTRAVENOUS at 02:10

## 2024-10-11 RX ADMIN — ONDANSETRON 4 MG: 2 INJECTION INTRAMUSCULAR; INTRAVENOUS at 02:10

## 2024-10-11 RX ADMIN — FENTANYL CITRATE 100 MCG: 50 INJECTION INTRAMUSCULAR; INTRAVENOUS at 11:10

## 2024-10-11 RX ADMIN — VASOPRESSIN 1 UNITS: 20 INJECTION INTRAVENOUS at 01:10

## 2024-10-11 RX ADMIN — ROCURONIUM BROMIDE 5 MG: 10 INJECTION, SOLUTION INTRAVENOUS at 11:10

## 2024-10-11 RX ADMIN — FENTANYL CITRATE 25 MCG: 50 INJECTION INTRAMUSCULAR; INTRAVENOUS at 04:10

## 2024-10-11 RX ADMIN — PHENYLEPHRINE HYDROCHLORIDE 150 MCG: 10 INJECTION INTRAVENOUS at 12:10

## 2024-10-11 RX ADMIN — FENTANYL CITRATE 25 MCG: 50 INJECTION INTRAMUSCULAR; INTRAVENOUS at 03:10

## 2024-10-11 RX ADMIN — SODIUM CHLORIDE, SODIUM GLUCONATE, SODIUM ACETATE, POTASSIUM CHLORIDE, MAGNESIUM CHLORIDE, SODIUM PHOSPHATE, DIBASIC, AND POTASSIUM PHOSPHATE: .53; .5; .37; .037; .03; .012; .00082 INJECTION, SOLUTION INTRAVENOUS at 11:10

## 2024-10-11 RX ADMIN — OXYCODONE 5 MG: 5 TABLET ORAL at 03:10

## 2024-10-11 RX ADMIN — PROPOFOL 40 MG: 10 INJECTION, EMULSION INTRAVENOUS at 02:10

## 2024-10-11 RX ADMIN — MIDAZOLAM 2 MG: 1 INJECTION INTRAMUSCULAR; INTRAVENOUS at 11:10

## 2024-10-11 RX ADMIN — SODIUM CHLORIDE 0.1 MCG/KG/MIN: 9 INJECTION, SOLUTION INTRAVENOUS at 11:10

## 2024-10-11 RX ADMIN — DEXAMETHASONE SODIUM PHOSPHATE 8 MG: 4 INJECTION INTRA-ARTICULAR; INTRALESIONAL; INTRAMUSCULAR; INTRAVENOUS; SOFT TISSUE at 11:10

## 2024-10-11 RX ADMIN — ACETAMINOPHEN 1000 MG: 500 TABLET ORAL at 09:10

## 2024-10-11 RX ADMIN — LIDOCAINE HYDROCHLORIDE 50 MG: 20 INJECTION, SOLUTION EPIDURAL; INFILTRATION; INTRACAUDAL at 11:10

## 2024-10-11 RX ADMIN — PROPOFOL 40 MG: 10 INJECTION, EMULSION INTRAVENOUS at 01:10

## 2024-10-11 RX ADMIN — VASOPRESSIN 1 UNITS: 20 INJECTION INTRAVENOUS at 02:10

## 2024-10-11 RX ADMIN — PROPOFOL 160 MG: 10 INJECTION, EMULSION INTRAVENOUS at 11:10

## 2024-10-11 RX ADMIN — SUCCINYLCHOLINE CHLORIDE 140 MG: 20 INJECTION, SOLUTION INTRAMUSCULAR; INTRAVENOUS; PARENTERAL at 11:10

## 2024-10-11 RX ADMIN — PHENYLEPHRINE HYDROCHLORIDE 100 MCG: 10 INJECTION INTRAVENOUS at 01:10

## 2024-10-11 RX ADMIN — PHENYLEPHRINE HYDROCHLORIDE 150 MCG: 10 INJECTION INTRAVENOUS at 01:10

## 2024-10-11 NOTE — ANESTHESIA PROCEDURE NOTES
Intubation    Date/Time: 10/11/2024 11:46 AM    Performed by: Kena Collier MD  Authorized by: Surjit Steve MD    Intubation:     Induction:  Intravenous    Intubated:  Postinduction    Mask Ventilation:  Easy with oral airway    Attempts:  1    Attempted By:  Resident anesthesiologist    Method of Intubation:  Video laryngoscopy    Blade:  C[place] 3    Laryngeal View Grade: Grade I - full view of cords      Difficult Airway Encountered?: No      Complications:  None    Airway Device:  EMG ETT (NIMS)    Airway Device Size:  7.0    Style/Cuff Inflation:  Cuffed (inflated to minimal occlusive pressure)    Inflation Amount (mL):  8    Tube secured:  23    Secured at:  The lips    Placement Verified By:  Capnometry    Complicating Factors:  Short neck    Findings Post-Intubation:  BS equal bilateral and atraumatic/condition of teeth unchanged

## 2024-10-11 NOTE — NURSING TRANSFER
Nursing Transfer Note      10/11/2024   3:29 PM    Nurse giving handoff:Nixon PARRA RN  Nurse receiving handoff:Yuki SPANGLER    Reason patient is being transferred: meets criteria    Transfer To: [facility]    Transfer via stretcher    Transfer with none    Transported by RN    Transfer Vital Signs:  Blood Pressure:141/89  Heart Rate:73  O2:99  Temperature:98.1  Respirations:17    Telemetry: SR  Order for Tele Monitor? No    Additional Lines: none    Medicines sent: none    Any special needs or follow-up needed: discharge after 1.5 hrs [facility]    Patient belongings transferred with patient: No    Chart send with patient: Yes    Notified: mother    Patient reassessed at: 10/11

## 2024-10-11 NOTE — TRANSFER OF CARE
"Anesthesia Transfer of Care Note    Patient: Talon Leblanc Jr.    Procedure(s) Performed: Procedure(s):  THYROIDECTOMY Total, Excision of substernal thyroid goiter    Patient location: PACU    Anesthesia Type: general    Transport from OR: Transported from OR on 6-10 L/min O2 by face mask with adequate spontaneous ventilation    Post pain: adequate analgesia    Post assessment: no apparent anesthetic complications    Post vital signs: stable    Level of consciousness: awake    Nausea/Vomiting: no nausea/vomiting    Complications: none    Transfer of care protocol was followed      Last vitals: Visit Vitals  /79 (BP Location: Left arm, Patient Position: Lying)   Pulse (!) 57   Temp 36.6 °C (97.9 °F) (Temporal)   Resp 18   Ht 5' 7" (1.702 m)   Wt 86.2 kg (190 lb)   SpO2 99%   BMI 29.76 kg/m²     "

## 2024-10-11 NOTE — ANESTHESIA POSTPROCEDURE EVALUATION
Anesthesia Post Evaluation    Patient: Talon Leblanc Jr.    Procedure(s) Performed: Procedure(s):  THYROIDECTOMY Total, Excision of substernal thyroid goiter    Final Anesthesia Type: general      Patient location during evaluation: PACU  Patient participation: Yes- Able to Participate  Level of consciousness: awake and alert and oriented  Post-procedure vital signs: reviewed and stable  Pain management: adequate  Airway patency: patent    PONV status at discharge: No PONV  Anesthetic complications: no      Cardiovascular status: hemodynamically stable  Respiratory status: unassisted, spontaneous ventilation and room air  Hydration status: euvolemic  Follow-up not needed.              Vitals Value Taken Time   /86 10/11/24 1517   Temp 36.7 °C (98.1 °F) 10/11/24 1446   Pulse 72 10/11/24 1528   Resp 15 10/11/24 1528   SpO2 97 % 10/11/24 1528   Vitals shown include unfiled device data.      No case tracking events are documented in the log.      Pain/Yessica Score: Pain Rating Prior to Med Admin: 7 (10/11/2024  3:20 PM)  Pain Rating Post Med Admin: 4 (10/11/2024  3:27 PM)  Yessica Score: 10 (10/11/2024  3:25 PM)

## 2024-10-11 NOTE — PLAN OF CARE
Pt IV flushed. Pt reports pain relief following administration of medication. Anterior neck incision CDI ice pack in place. Due to void

## 2024-10-11 NOTE — PROGRESS NOTES
Pt family requested that patient stay overnight for monitoring, explained that thyroidectomy patients are usually not admitted to the hospital. Family is requesting to speak to a doctor. Dr Vogt notified

## 2024-10-11 NOTE — OP NOTE
[facility]  Endocrine Surgery  Operative Report         Date of Procedure: 10/11/2024     Procedure: Procedure(s):  THYROIDECTOMY Total, Excision of substernal thyroid goiter     Indications: This patient presents with multiple thyroid nodules bilaterally, dominant left inferior nodule measures 4.1 cm and has had prior FLUS/AUS cytology and positive molecular markers conferring a 95% risk of malignancy. . The patient now presents for a total thyroidectomy.     Surgeons and Role:     * Debbie Gu MD - Primary     * Radha Vogt MD - Resident - (PGY-4)    Assisting Surgeon: None  Pre-Operative Diagnosis: Thyroid cancer [C73]    Post-Operative Diagnosis: Thyroid cancer [C73]    Anesthesia: General    Procedures:   Total thyroidectomy, excision of substernal multinodular thyroid goiter via cervical incision  Intraoperative monitoring and interpretation of bilateral cranial nerves (vagus and recurrent laryngeal nerves) using the Neurovision Cobra EMG endotracheal tube and StockCastr-Response 3.0 system    Intraoperative Findings:   Substernal left inferior thyroid nodule, no suspicious features.   Firm right mid thyroid nodule with possible extrathyroidal extension with adherent overlying sternothyroid muscle.  The bilateral recurrent laryngeal and vagus nerves were identified and preserved.  Function was verified using the nerve monitoring system.    Description of the Procedure:  The patient was seen in the Holding Room. The risks, benefits, complications, treatment options, and expected outcomes were discussed with the patient.  I discussed in detail the possible complications associated with thyroid surgery, which may include (but not limited to) hoarseness or voice changes, recurrent laryngeal nerve injury - temporary or permanent, superior laryngeal nerve injury - temporary or permanent, hypocalcemia and hypoparathyroidism - temporary or permanent, neck hematoma, bleeding, infection,  scarring, reaction to medication, pulmonary aspiration, perforation of viscus, failure to diagnose a condition, complications requiring transfusion, death and imponderables.  I discussed the potential need for a staged completion thyroidectomy in the event of unexpected intraoperative findings or recurrent laryngeal nerve dysfunction and thus the initial surgery would be limited to a thyroid lobectomy.  Thyroid hormone replacement will be necessary lifelong. Thyroid function will need to be monitored.  The patient agreed to the procedure despite the risks involved with the proposed plan, giving informed consent.  The site of surgery properly noted/marked. The patient was taken to the operating room, identified as Talon Leblanc Jr. and the procedure verified as total thyroidectomy.     Induction of general anesthesia was performed, the patient was intubated with an electromyography endotracheal tube, and the anesthesia team placed all appropriate lines.  Appropriate lines and access were confirmed by the anesthesia team.  The patient was positioned by the operating room, anesthesia, and surgical staff in a modified beachchair position with a shoulder roll, the neck supported in an extended position, the arms padded and tucked.  An intraoperative ultrasound was performed prior to incision and identified the anatomic landmarks of the thyroid, the inferior pole of the left thyroid lobe could not be completely visualized with the ultrasound.  The surgical field was prepped and draped in sterile fashion.  A comprehensive time out was performed.  A 6 cm transverse cervical incision was created below the cricoid cartilage within a natural skin fold.  Dissection was carried down through the platysma layer.  Once this was completed, sub-platysmal flaps were raised superiorly to the thyroid cartilage and inferiorly to the sternal notch. The strap muscles were identified and divided at the midline. Sharp and blunt  dissection were used to mobilize the left thyroid lobe in a medial direction.  The isthmus and one of the left thyroid nodules were mobile without gross extrathyroidal extension.  The middle thyroid vein was ligated with a silk tie and divided with the Ligasure.  Dissection continued posterolaterally to expose the tracheoesophageal groove and left carotid artery.  Vagus nerve signal was confirmed with the nerve monitoring system.  The superior pole was carefully dissected free and the superior pole vessels were individually ligated with silk ties and divided with the Ligasure.  The left thyroid lobe was mobilized further, the inferior pole of the thyroid extended well behind the clavicle into the mediastinum and was circumferentially freed allowing for delivery of the inferior pole which appeared more to be a nodule extending into the substernal space once the left thyroid was delivered from the incision.  The left recurrent laryngeal nerve was deep in the tracheoesophageal groove and preserved.  Function was verified using the nerve monitoring system.  The ligament of Mcgee was carefully dissected and divided taking care to preserve the recurrent laryngeal nerve.  The thyroid and isthmus were dissected off the trachea using the Ligasure and bipolar cautery.   A small pyramidal lobe was identified, dissected free from the anterior surface of the cricothyroid muscle and thyroid cartilage and divided at the level of the thyroid cartilage.  Vagus and recurrent laryngeal nerve function were confirmed with the nerve monitoring system prior to proceeding to the contralateral dissection.      Attention was then given to the contralateral lobe.  Sharp and blunt dissection were again used to mobilize the right thyroid lobe in a medial direction.  There was a firm nodule in the mid portion of the thyroid with adherent sternothyroid muscle which was excised en bloc over the thyroid nodule.  The middle thyroid vein was ligated  with a silk tie and divided with the Ligasure.  Dissection continued posteriorly to expose the tracheoesophageal groove and right carotid artery.  Vagus nerve signal was confirmed with the nerve monitoring system.  Similar to the contralateral side, the right thyroid lobe was mobilized further and the superior and inferior pole vessels were ligated with silk ties and divided with the Ligasure.  The right recurrent laryngeal nerve was identified and preserved.  Function was verified using the nerve monitoring system.  The ligament of Mcgee was carefully dissected and divided taking care to preserve the recurrent laryngeal nerve.  The thyroid was dissected off the trachea using the Ligasure and bipolar cautery.  Vagus and recurrent laryngeal nerve function were confirmed with the nerve monitoring system.  The specimen was submitted to pathology for permanent evaluation.  A suture was placed for orientation purposes, stitch marks the right superior pole, long stitch marks left substernal thyroid nodule, double stitch marks possible extrathyroidal extension at the region of the adherent skeletal muscle at the right midpole of the thyroid.     The wound was irrigated and inspected carefully.  Multiple Valsalva maneuvers were performed at 30-35 cm of water and additional hemostasis was achieved as necessary with focal application of bipolar cautery. This was augmented with Fibrillar which was placed in the thyroid fossa.   Function of the bilateral recurrent laryngeal and vagus nerves were again verified using the nerve monitoring system prior to closure.  The strap muscles were closed with interrupted 3-0 Vicryl suture.  The platysma was closed with interrupted 3-0 Vicryl suture, 0.25% Marcaine was injected into the subcutaneous tissue of the incision for post-op analgesia and the skin incision was closed with a 6-0 Vicryl subcuticular knot-less closure.  Sterile skin glue was applied to the incision.    Instrument,  sponge and needle counts were reported correct prior to closure and at the conclusion of the case.  Procedures and specimens were confirmed with the circulating nurse at the completion of the case.  The family was updated at the completion of the case.    Complications: No    Estimated Blood Loss (EBL): less than 50 mL           Drains: None    Specimens:   Specimen (24h ago, onward)       Start     Ordered    10/11/24 1401  Specimen to Pathology, Surgery Thyroid, Parathyroid, and Adrenals  Once        Comments: Pre-op Diagnosis: Thyroid cancer [C73]Procedure(s):THYROIDECTOMY Total, Excision of substernal thyroid goiter1.  Total thyroid; short stitch right superior pole, long stitch left substernal thyroid nodule, double stitch possible thyroidal extension--permanent     References:    Click here for ordering Quick Tip   Question Answer Comment   Procedure Type: Thyroid, Parathyroid, and Adrenals    Specimen Class: Known or suspected malignancy    Release to patient Immediate        10/11/24 4551                            Condition: stable    Disposition: PACU - hemodynamically stable.    Attestation: I was present and scrubbed for the entire procedure.

## 2024-10-11 NOTE — BRIEF OP NOTE
[facility] - Surgery ([facility])  Brief Operative Note    Surgery Date: 10/11/2024     Surgeons and Role:     * Debbie Gu MD - Primary     * Radha Vogt MD - Resident - Chief    Assisting Surgeon: None    Pre-op Diagnosis:  Thyroid cancer [C73]    Post-op Diagnosis:  Post-Op Diagnosis Codes:     * Thyroid cancer [C73]    Procedure(s):  THYROIDECTOMY Total, Excision of substernal thyroid goiter    Anesthesia: General    Operative Findings:   Total thyroidectomy performed without apparent complication. Possible extrathyroidal extension on the R thyroid.     Estimated Blood Loss: <50cc         Specimens:   Specimen (24h ago, onward)       Start     Ordered    10/11/24 1401  Specimen to Pathology, Surgery Thyroid, Parathyroid, and Adrenals  Once        Comments: Pre-op Diagnosis: Thyroid cancer [C73]Procedure(s):THYROIDECTOMY Total, Excision of substernal thyroid goiter1.  Total thyroid; short stitch right superior pole, long stitch left substernal thyroid nodule, double stitch possible thyroidal extension--permanent     References:    Click here for ordering Quick Tip   Question Answer Comment   Procedure Type: Thyroid, Parathyroid, and Adrenals    Specimen Class: Known or suspected malignancy    Release to patient Immediate        10/11/24 1419                      Discharge Note    OUTCOME: Patient tolerated treatment/procedure well without complication and is now ready for discharge.    DISPOSITION: Home or Self Care    FINAL DIAGNOSIS:  Multinodular goiter (nontoxic)    FOLLOWUP: In clinic    DISCHARGE INSTRUCTIONS:    Discharge Procedure Orders   Notify your health care provider if you experience any of the following:  temperature >100.4     Notify your health care provider if you experience any of the following:  persistent nausea and vomiting or diarrhea     Notify your health care provider if you experience any of the following:  severe uncontrolled pain     Notify your health care provider if you  experience any of the following:  redness, tenderness, or signs of infection (pain, swelling, redness, odor or green/yellow discharge around incision site)     Activity as tolerated

## 2024-10-11 NOTE — DISCHARGE INSTRUCTIONS
Post-Operative Instructions: Thyroidectomy    MEDICATIONS  You are being discharged home on the following medications:  Thyroid hormone  Levothyroxine (Synthroid): 137 mcg, once daily in the morning  Take first thing in the morning, on an empty stomach.  Wait at least 30 minutes before you eat, drink or take medications to maximize its absorption.  Please avoid taking any multi-vitamins (anything with iron) or calcium supplements for at least 4 hours after taking levothyroxine.  If you miss the dose on one day, take two doses the next morning to make up for the missed dose.    Calcium Supplementation  Calcium Carbonate (Tums Ultra): 2 tablets, 2 times a day with a snack or meal  One Tums Ultra tablet has 1000 mg calcium carbonate which is equal to 400 mg elemental calcium.  *If you notice any numbness or tingling of the face, hands, or feet, take 2 extra tablets of Tums.  If symptoms do not subside within a half-hour, please call your surgeon's office.  Wait at least 4 hours after taking levothyroxine before taking the Tums.  Do NOT take your Tums in the morning of your lab draw.   Calcium carbonate (Tums) can cause constipation.  Please use over the counter stool softeners such as docusate (Colace) or laxatives such as polyethylene glycol (Miralax) to help avoid constipation.     Pain medication  Chloraseptic lozenges or spray: Use as needed for sore throat  Please take Tylenol for mild pain.  You can take up to 1000mg every 6-8 hours.  Do not exceed 4000mg in 24 hours  You can take the narcotic pain (oxycodone) medication for more severe pain.    You may resume taking your normal medications, with the EXCEPTION of the following:  You can restart the following medications 72 hours (3 days) after your surgery unless otherwise instructed by your surgeon or internist/cardiologist::  Apixaban (Eliquis), Clopidogrel (Plavix), Dabigatran (Pradaxa), Dipyridamole (Aggrenox), Rivaroxaban (Xarelto), Warfarin (Coumadin), or  any other type of blood thinner you may be taking.  Aspirin & anti-inflammatories (i.e. Goody's, Excedrin, ibuprofen, Advil, Aleve): May begin 72 hours after surgery.  Vitamins, minerals, and herbal supplements: Please wait 1 week after surgery to restart these medications    WOUND CARE  Please use your ice pack for 30 minutes on / 30 minutes off for at least the first 3 days.  You will be discharged from the hospital with your incision covered by skin glue that will remain on until your postoperative appointment.  It is normal to develop a lump under the incision, this is expected and is related to the healing process.  The lump will gradually go away with time.  You may shower when you return home after the surgery. No bathing or swimming (do not submerge in water) until cleared by your surgeon.  Please notify your physician if your incision is red, warm/hot, if you have an increase in swelling, any new drainage, or if you have a fever >101.5 F.  Please call 911 or proceed to the Emergency Room if you have difficulty breathing.    ACTIVITIES  You may resume normal activities, depending on your energy level.  Please refrain from driving for at least 3 days, or until you have complete mobility of your neck. Do not drive if you are taking narcotic pain medication.  Please refrain from heavy lifting (10 lbs.) for 10 days.    If you have any questions or concerns during the daytime, please send a [employer] message to your surgeon or call the surgeon's office at [phone].  On nights and weekends, please call [phone] and ask for the General Surgery Resident on call.    Future Appointments   Date Time Provider Department Center   10/22/2024  8:30 AM LAB, APPOINTMENT [facility]   10/24/2024  8:30 AM Debbie Gu MD [facility] IV[facility]   11/26/2024  8:30 AM Janey Edmond NP [facility] N[facility]      Do NOT take your calcium supplements in the morning of your lab appointment.

## 2024-10-21 LAB
FINAL PATHOLOGIC DIAGNOSIS: NORMAL
GROSS: NORMAL
Lab: NORMAL

## 2024-10-22 ENCOUNTER — LAB VISIT (OUTPATIENT)
Dept: LAB | Facility: HOSPITAL | Age: 49
End: 2024-10-22
Attending: STUDENT IN AN ORGANIZED HEALTH CARE EDUCATION/TRAINING PROGRAM
Payer: COMMERCIAL

## 2024-10-22 DIAGNOSIS — E89.0 S/P TOTAL THYROIDECTOMY: ICD-10-CM

## 2024-10-22 LAB
ALBUMIN SERPL BCP-MCNC: 4.3 G/DL (ref 3.5–5.2)
ANION GAP SERPL CALC-SCNC: 11 MMOL/L (ref 8–16)
BUN SERPL-MCNC: 13 MG/DL (ref 6–20)
CALCIUM SERPL-MCNC: 10.1 MG/DL (ref 8.7–10.5)
CHLORIDE SERPL-SCNC: 108 MMOL/L (ref 95–110)
CO2 SERPL-SCNC: 22 MMOL/L (ref 23–29)
CREAT SERPL-MCNC: 1 MG/DL (ref 0.5–1.4)
EST. GFR  (NO RACE VARIABLE): >60 ML/MIN/1.73 M^2
GLUCOSE SERPL-MCNC: 99 MG/DL (ref 70–110)
PHOSPHATE SERPL-MCNC: 3.8 MG/DL (ref 2.7–4.5)
POTASSIUM SERPL-SCNC: 4.2 MMOL/L (ref 3.5–5.1)
PTH-INTACT SERPL-MCNC: 43.7 PG/ML (ref 9–77)
SODIUM SERPL-SCNC: 141 MMOL/L (ref 136–145)

## 2024-10-22 PROCEDURE — 36415 COLL VENOUS BLD VENIPUNCTURE: CPT | Performed by: STUDENT IN AN ORGANIZED HEALTH CARE EDUCATION/TRAINING PROGRAM

## 2024-10-22 PROCEDURE — 83970 ASSAY OF PARATHORMONE: CPT | Performed by: STUDENT IN AN ORGANIZED HEALTH CARE EDUCATION/TRAINING PROGRAM

## 2024-10-22 PROCEDURE — 80069 RENAL FUNCTION PANEL: CPT | Performed by: STUDENT IN AN ORGANIZED HEALTH CARE EDUCATION/TRAINING PROGRAM

## 2024-10-23 NOTE — PROGRESS NOTES
Postoperative Endocrine Surgery Clinic Note    Reason for visit / Chief complaint: Postoperative evaluation  Endocrinologist: Siomara Aazr NP  Procedure:  THYROIDECTOMY Total, Excision of substernal thyroid goiter  Procedure Date: 10/11/2024    Subjective:     Talon Leblanc Jr. returns today for postoperative evaluation, he is approximately 2 weeks post op.    Procedure:   Total thyroidectomy, excision of substernal multinodular thyroid goiter via cervical incision  Intraoperative monitoring and interpretation of bilateral cranial nerves (vagus and recurrent laryngeal nerves) using the Neurovision Cobra EMG endotracheal tube and Medtronic NIM-Response 3.0 system    Operative findings:   Substernal left inferior thyroid nodule, no suspicious features.   Firm right mid thyroid nodule with possible extrathyroidal extension with adherent overlying sternothyroid muscle.  The bilateral recurrent laryngeal and vagus nerves were identified and preserved.  Function was verified using the nerve monitoring system.    Discharge medications:  - Levothyroxine: 137 mcg  - Calcium carbonate: 800 mg BID     He has had an uncomplicated postoperative course. He denies signs or symptoms of hypocalcemia.  His phonation is at baseline.  He denies hyperthyroid or hypothyroid symptoms.  He is currently taking the levothyroxine in the morning on an empty stomach and 30-60 minutes before breakfast or other medications.    Current Outpatient Medications   Medication Sig Dispense Refill    latanoprost 0.005 % ophthalmic solution Inject 2 drops into the eye every evening.      levothyroxine (SYNTHROID) 137 MCG Tab tablet Take 1 tablet (137 mcg total) by mouth before breakfast. 30 tablet 11    losartan (COZAAR) 25 MG tablet Take 1 tablet (25 mg total) by mouth once daily. (Patient taking differently: Take 25 mg by mouth every evening.) 90 tablet 3    rosuvastatin (CRESTOR) 20 MG tablet Take 1 tablet (20 mg total) by mouth once  "daily. (Patient taking differently: Take 20 mg by mouth every evening.) 90 tablet 3     No current facility-administered medications for this visit.     Facility-Administered Medications Ordered in Other Visits   Medication Dose Route Frequency Provider Last Rate Last Admin    0.9%  NaCl infusion   Intravenous Continuous Serafin Avina MD 10 mL/hr at 08/19/22 0830 New Bag at 08/19/22 0830    LIDOcaine (PF) 10 mg/ml (1%) injection 10 mg  1 mL Intradermal Once Serafin Avina MD        mupirocin 2 % ointment   Nasal On Call Procedure Sharon Tirado MD   Given at 08/19/22 0830     Review of patient's allergies indicates:  No Known Allergies     Review of Systems  Negative except as per HPI.     Objective:   BP (!) 140/75   Pulse 63   Ht 5' 7" (1.702 m)   Wt 90.2 kg (198 lb 15.4 oz)   SpO2 99%   BMI 31.16 kg/m²     General: alert, well appearing, and in no distress  Neck: neck is flat, no erythema or edema  Incision: well approximated, healing well  Neurological: voice is strong    Labs:  Lab Results   Component Value Date    CALCIUM 10.1 10/22/2024    ALBUMIN 4.3 10/22/2024    PTH 43.7 10/22/2024    PHOS 3.8 10/22/2024    [id number] 25 (L) 03/14/2011       Pathology  Final Pathologic Diagnosis   Date Value Ref Range Status   10/11/2024   Final    Thyroid, total thyroidectomy:   Pending consultation with [facility]    Final diagnosis with additional information will be provided in a supplemental report.       Comment:     Interp By Suyapa Hanna M.D., Signed on 10/21/2024 at 16:29       Assessment and Plan:       Problem List Items Addressed This Visit       Multinodular goiter (nontoxic) - Primary    Current Assessment & Plan     49 yoM s/p total thyroidectomy on 10/11 for multiple thyroid nodules bilaterally with 4.1 cm dominant left inferior nodule that had prior FLUS/AUS cytology and positive molecular markers s/p total thyroidectomy with excision of substernal thyroid goiter on " 10/11/224.     Doing well postoperatively.  Final pathology is pending [facility] at the [facility]..    - Levothyroxine 137 mcg daily, reviewed instructions to take in the morning on an empty stomach  - Discontinue postoperative calcium supplementation  - Recommended taking daily calcium via dietary sources or supplementation equal to about 1200 mg daily  - Recommend taking 1000 - 2000 IU of over the counter vitamin D3 supplementation daily  - Incision care discussed, scar massage and routine scar care information provided  - Follow up pathology  - Discontinue narcotics  - Obtain TSH with Tg in approximately 4-6 weeks  -          Vitamin D insufficiency    Current Assessment & Plan     25 in 3/2011  - Recommend taking 1000 - 2000 IU of over the counter vitamin D3 supplementation daily         S/P total thyroidectomy    Current Assessment & Plan     - See Multinodular goiter          Debbie Gu MD  Staff Surgeon  Endocrine Surgery  10/24/24

## 2024-10-23 NOTE — ASSESSMENT & PLAN NOTE
49 yoM s/p total thyroidectomy on 10/11 for multiple thyroid nodules bilaterally with 4.1 cm dominant left inferior nodule that had prior FLUS/AUS cytology and positive molecular markers s/p total thyroidectomy with excision of substernal thyroid goiter on 10/11/224.     Doing well postoperatively.  Final pathology is pending review at the [facility]..    - Levothyroxine 137 mcg daily, reviewed instructions to take in the morning on an empty stomach  - Discontinue postoperative calcium supplementation  - Recommended taking daily calcium via dietary sources or supplementation equal to about 1200 mg daily  - Recommend taking 1000 - 2000 IU of over the counter vitamin D3 supplementation daily  - Incision care discussed, scar massage and routine scar care information provided  - Follow up pathology  - Discontinue narcotics  - Obtain TSH with Tg in approximately 4-6 weeks  -

## 2024-10-24 ENCOUNTER — OFFICE VISIT (OUTPATIENT)
Dept: SURGERY | Facility: CLINIC | Age: 49
End: 2024-10-24
Payer: COMMERCIAL

## 2024-10-24 ENCOUNTER — PATIENT MESSAGE (OUTPATIENT)
Dept: SURGERY | Facility: CLINIC | Age: 49
End: 2024-10-24

## 2024-10-24 ENCOUNTER — IMMUNIZATION (OUTPATIENT)
Dept: INTERNAL MEDICINE | Facility: CLINIC | Age: 49
End: 2024-10-24
Payer: COMMERCIAL

## 2024-10-24 VITALS
SYSTOLIC BLOOD PRESSURE: 140 MMHG | BODY MASS INDEX: 31.22 KG/M2 | HEART RATE: 63 BPM | WEIGHT: 198.94 LBS | HEIGHT: 67 IN | OXYGEN SATURATION: 99 % | DIASTOLIC BLOOD PRESSURE: 75 MMHG

## 2024-10-24 DIAGNOSIS — E55.9 VITAMIN D INSUFFICIENCY: ICD-10-CM

## 2024-10-24 DIAGNOSIS — E04.2 MULTINODULAR GOITER (NONTOXIC): Primary | ICD-10-CM

## 2024-10-24 DIAGNOSIS — E89.0 S/P TOTAL THYROIDECTOMY: ICD-10-CM

## 2024-10-24 DIAGNOSIS — Z23 NEED FOR VACCINATION: Primary | ICD-10-CM

## 2024-10-24 PROBLEM — Z90.89 S/P TOTAL THYROIDECTOMY: Status: ACTIVE | Noted: 2024-10-24

## 2024-10-24 PROBLEM — Z98.890 S/P TOTAL THYROIDECTOMY: Status: ACTIVE | Noted: 2024-10-24

## 2024-10-24 PROCEDURE — 3078F DIAST BP <80 MM HG: CPT | Mod: CPTII,S$GLB,, | Performed by: STUDENT IN AN ORGANIZED HEALTH CARE EDUCATION/TRAINING PROGRAM

## 2024-10-24 PROCEDURE — 99024 POSTOP FOLLOW-UP VISIT: CPT | Mod: S$GLB,,, | Performed by: STUDENT IN AN ORGANIZED HEALTH CARE EDUCATION/TRAINING PROGRAM

## 2024-10-24 PROCEDURE — 90471 IMMUNIZATION ADMIN: CPT | Mod: S$GLB,,, | Performed by: INTERNAL MEDICINE

## 2024-10-24 PROCEDURE — 3077F SYST BP >= 140 MM HG: CPT | Mod: CPTII,S$GLB,, | Performed by: STUDENT IN AN ORGANIZED HEALTH CARE EDUCATION/TRAINING PROGRAM

## 2024-10-24 PROCEDURE — 99999 PR PBB SHADOW E&M-EST. PATIENT-LVL III: CPT | Mod: PBBFAC,,, | Performed by: STUDENT IN AN ORGANIZED HEALTH CARE EDUCATION/TRAINING PROGRAM

## 2024-10-24 PROCEDURE — 1159F MED LIST DOCD IN RCRD: CPT | Mod: CPTII,S$GLB,, | Performed by: STUDENT IN AN ORGANIZED HEALTH CARE EDUCATION/TRAINING PROGRAM

## 2024-10-24 PROCEDURE — 3044F HG A1C LEVEL LT 7.0%: CPT | Mod: CPTII,S$GLB,, | Performed by: STUDENT IN AN ORGANIZED HEALTH CARE EDUCATION/TRAINING PROGRAM

## 2024-10-24 PROCEDURE — 4010F ACE/ARB THERAPY RXD/TAKEN: CPT | Mod: CPTII,S$GLB,, | Performed by: STUDENT IN AN ORGANIZED HEALTH CARE EDUCATION/TRAINING PROGRAM

## 2024-10-24 PROCEDURE — 90656 IIV3 VACC NO PRSV 0.5 ML IM: CPT | Mod: S$GLB,,, | Performed by: INTERNAL MEDICINE

## 2024-10-24 NOTE — ASSESSMENT & PLAN NOTE
25 in 3/2011  - Recommend taking 1000 - 2000 IU of over the counter vitamin D3 supplementation daily

## 2024-11-06 ENCOUNTER — TELEPHONE (OUTPATIENT)
Dept: SURGERY | Facility: CLINIC | Age: 49
End: 2024-11-06
Payer: COMMERCIAL

## 2024-11-06 NOTE — TELEPHONE ENCOUNTER
----- Message from SANDRA Babb sent at 10/24/2024 10:00 AM CDT -----  Schedule RTC Visit after Path Finalized

## 2024-11-11 ENCOUNTER — TELEPHONE (OUTPATIENT)
Dept: SURGERY | Facility: CLINIC | Age: 49
End: 2024-11-11
Payer: COMMERCIAL

## 2024-11-20 ENCOUNTER — TELEPHONE (OUTPATIENT)
Dept: SURGERY | Facility: CLINIC | Age: 49
End: 2024-11-20
Payer: COMMERCIAL

## 2024-11-20 NOTE — TELEPHONE ENCOUNTER
Pt returned to call to schedule F/U visit to discuss pathology reports. Appt scheduled for 11/21/24 @ 11am

## 2024-11-20 NOTE — TELEPHONE ENCOUNTER
LM on pt VM with direct line to call back and make appointment with Dr. Gu to F/U on pathology results

## 2024-11-21 ENCOUNTER — OFFICE VISIT (OUTPATIENT)
Dept: SURGERY | Facility: CLINIC | Age: 49
End: 2024-11-21
Payer: COMMERCIAL

## 2024-11-21 VITALS
SYSTOLIC BLOOD PRESSURE: 116 MMHG | HEART RATE: 82 BPM | HEIGHT: 67 IN | BODY MASS INDEX: 30.42 KG/M2 | OXYGEN SATURATION: 98 % | DIASTOLIC BLOOD PRESSURE: 61 MMHG | WEIGHT: 193.81 LBS

## 2024-11-21 DIAGNOSIS — E04.2 MULTINODULAR GOITER (NONTOXIC): ICD-10-CM

## 2024-11-21 DIAGNOSIS — C73 MEDULLARY THYROID CARCINOMA: ICD-10-CM

## 2024-11-21 DIAGNOSIS — E55.9 VITAMIN D INSUFFICIENCY: ICD-10-CM

## 2024-11-21 DIAGNOSIS — C73 PAPILLARY CARCINOMA, FOLLICULAR VARIANT: ICD-10-CM

## 2024-11-21 DIAGNOSIS — C73 THYROID CANCER: Primary | ICD-10-CM

## 2024-11-21 DIAGNOSIS — E89.0 S/P TOTAL THYROIDECTOMY: ICD-10-CM

## 2024-11-21 PROCEDURE — 99024 POSTOP FOLLOW-UP VISIT: CPT | Mod: S$GLB,,, | Performed by: STUDENT IN AN ORGANIZED HEALTH CARE EDUCATION/TRAINING PROGRAM

## 2024-11-21 PROCEDURE — 4010F ACE/ARB THERAPY RXD/TAKEN: CPT | Mod: CPTII,S$GLB,, | Performed by: STUDENT IN AN ORGANIZED HEALTH CARE EDUCATION/TRAINING PROGRAM

## 2024-11-21 PROCEDURE — 99999 PR PBB SHADOW E&M-EST. PATIENT-LVL III: CPT | Mod: PBBFAC,,, | Performed by: STUDENT IN AN ORGANIZED HEALTH CARE EDUCATION/TRAINING PROGRAM

## 2024-11-21 PROCEDURE — 3074F SYST BP LT 130 MM HG: CPT | Mod: CPTII,S$GLB,, | Performed by: STUDENT IN AN ORGANIZED HEALTH CARE EDUCATION/TRAINING PROGRAM

## 2024-11-21 PROCEDURE — 3078F DIAST BP <80 MM HG: CPT | Mod: CPTII,S$GLB,, | Performed by: STUDENT IN AN ORGANIZED HEALTH CARE EDUCATION/TRAINING PROGRAM

## 2024-11-21 PROCEDURE — 3044F HG A1C LEVEL LT 7.0%: CPT | Mod: CPTII,S$GLB,, | Performed by: STUDENT IN AN ORGANIZED HEALTH CARE EDUCATION/TRAINING PROGRAM

## 2024-11-21 PROCEDURE — 1159F MED LIST DOCD IN RCRD: CPT | Mod: CPTII,S$GLB,, | Performed by: STUDENT IN AN ORGANIZED HEALTH CARE EDUCATION/TRAINING PROGRAM

## 2024-11-21 NOTE — ASSESSMENT & PLAN NOTE
Multiple thyroid nodules bilaterally with 4.1 cm dominant left inferior nodule that had prior FLUS/AUS cytology and positive molecular markers s/p total thyroidectomy with excision of substernal thyroid goiter on 10/11/224.  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  No lymphovascular invasion.     - See papillary carcinoma and medullary thyroid carcinoma  - Continue levothyroxine 137 mcg daily  - Check TSH  - Ultrasound at 6 months (June 2025) with endocrinology   - Neck stretches

## 2024-11-21 NOTE — PROGRESS NOTES
Postoperative Endocrine Surgery Clinic Note    Reason for visit / Chief complaint: Postoperative evaluation  Endocrinologist: Siomara Azar NP  Procedure:  THYROIDECTOMY Total, Excision of substernal thyroid goiter  Procedure Date: 10/11/2024    Subjective:     Talon Leblanc Jr. returns today for postoperative evaluation and to review the pathology.  He has been doing well otherwise and has been compliant with the levothyroxine as prescribed.      Procedure:   Total thyroidectomy, excision of substernal multinodular thyroid goiter via cervical incision  Intraoperative monitoring and interpretation of bilateral cranial nerves (vagus and recurrent laryngeal nerves) using the Neurovision Cobra EMG endotracheal tube and Med[employer]onic NIM-Response 3.0 system    Operative findings:   Substernal left inferior thyroid nodule, no suspicious features.   Firm right mid thyroid nodule with possible extrathyroidal extension with adherent overlying sternothyroid muscle.  The bilateral recurrent laryngeal and vagus nerves were identified and preserved.  Function was verified using the nerve monitoring system.      Current Outpatient Medications   Medication Sig Dispense Refill    latanoprost 0.005 % ophthalmic solution Inject 2 drops into the eye every evening.      levothyroxine (SYNTHROID) 137 MCG Tab tablet Take 1 tablet (137 mcg total) by mouth before breakfast. 30 tablet 11    losartan (COZAAR) 25 MG tablet Take 1 tablet (25 mg total) by mouth once daily. (Patient taking differently: Take 25 mg by mouth every evening.) 90 tablet 3    rosuvastatin (CRESTOR) 20 MG tablet Take 1 tablet (20 mg total) by mouth once daily. (Patient taking differently: Take 20 mg by mouth every evening.) 90 tablet 3     No current facility-administered medications for this visit.     Facility-Administered Medications Ordered in Other Visits   Medication Dose Route Frequency Provider Last Rate Last Admin    0.9%  NaCl infusion   Intravenous  "Continuous Serafin Avina MD 10 mL/hr at 08/19/22 0830 New Bag at 08/19/22 0830    LIDOcaine (PF) 10 mg/ml (1%) injection 10 mg  1 mL Intradermal Once Serafin Avina MD        mupirocin 2 % ointment   Nasal On Call Procedure Sharon Tirado MD   Given at 08/19/22 0830     Review of patient's allergies indicates:  No Known Allergies     Review of Systems  Negative except as per HPI.     Objective:   /61   Pulse 82   Ht 5' 7" (1.702 m)   Wt 87.9 kg (193 lb 12.6 oz)   SpO2 98%   BMI 30.35 kg/m²     General: alert, well appearing, and in no distress  Neck: neck is flat, no erythema or edema  Incision: well approximated, healing well  Neurological: voice is strong    Labs:  Lab Results   Component Value Date    CALCIUM 10.1 10/22/2024    ALBUMIN 4.3 10/22/2024    PTH 43.7 10/22/2024    PHOS 3.8 10/22/2024    [id number] 25 (L) 03/14/2011       Pathology  Final Pathologic Diagnosis   Date Value Ref Range Status   10/11/2024   Corrected    Thyroid, total thyroidectomy:   Pending consultation with [facility]    Final diagnosis with additional information will be provided in a supplemental report.       Comment:     Interp By Suyapa Hanna M.D., Signed on 11/15/2024 at 13:11         Assessment and Plan:     Multiple thyroid nodules bilaterally with 4.1 cm dominant left inferior nodule that had prior FLUS/AUS cytology and positive molecular markers s/p total thyroidectomy with excision of substernal thyroid goiter on 10/11/224. 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 No lymphovascular invasion.     Problem List Items Addressed This Visit       RESOLVED: Multinodular goiter (nontoxic)    Vitamin D insufficiency     - Check vitamin D  - Continue 2000 IU over the counter vitamin D3 daily         S/P total thyroidectomy     - Continue levothyroxine " 137 mcg daily  - Check TSH  - Ultrasound at 6 months (June 2025) with endocrinology   - Neck stretches         Relevant Orders    Vitamin D    CALCITONIN    CEA    US Soft Tissue Head Neck    Papillary carcinoma, follicular variant     - TSH  - Tg         Medullary thyroid carcinoma     - Calcitonin  - CEA          Other Visit Diagnoses       Thyroid cancer    -  Primary    Relevant Orders    Vitamin D    CALCITONIN    CEA    US Soft Tissue Head Neck          Debbie Gu MD  Staff Surgeon  Endocrine Surgery  11/21/24

## 2024-11-22 ENCOUNTER — LAB VISIT (OUTPATIENT)
Dept: LAB | Facility: HOSPITAL | Age: 49
End: 2024-11-22
Attending: STUDENT IN AN ORGANIZED HEALTH CARE EDUCATION/TRAINING PROGRAM
Payer: COMMERCIAL

## 2024-11-22 ENCOUNTER — TELEPHONE (OUTPATIENT)
Dept: ENDOCRINOLOGY | Facility: CLINIC | Age: 49
End: 2024-11-22
Payer: COMMERCIAL

## 2024-11-22 DIAGNOSIS — E89.0 S/P TOTAL THYROIDECTOMY: ICD-10-CM

## 2024-11-22 DIAGNOSIS — C73 THYROID CANCER: ICD-10-CM

## 2024-11-22 LAB
25(OH)D3+25(OH)D2 SERPL-MCNC: 21 NG/ML (ref 30–96)
CEA SERPL-MCNC: 2.7 NG/ML (ref 0–5)
T4 FREE SERPL-MCNC: 1.24 NG/DL (ref 0.71–1.51)
TSH SERPL DL<=0.005 MIU/L-ACNC: 0.13 UIU/ML (ref 0.4–4)

## 2024-11-22 PROCEDURE — 36415 COLL VENOUS BLD VENIPUNCTURE: CPT | Performed by: STUDENT IN AN ORGANIZED HEALTH CARE EDUCATION/TRAINING PROGRAM

## 2024-11-22 PROCEDURE — 84443 ASSAY THYROID STIM HORMONE: CPT | Performed by: STUDENT IN AN ORGANIZED HEALTH CARE EDUCATION/TRAINING PROGRAM

## 2024-11-22 PROCEDURE — 82308 ASSAY OF CALCITONIN: CPT | Performed by: STUDENT IN AN ORGANIZED HEALTH CARE EDUCATION/TRAINING PROGRAM

## 2024-11-22 PROCEDURE — 82378 CARCINOEMBRYONIC ANTIGEN: CPT | Performed by: STUDENT IN AN ORGANIZED HEALTH CARE EDUCATION/TRAINING PROGRAM

## 2024-11-22 PROCEDURE — 82306 VITAMIN D 25 HYDROXY: CPT | Performed by: STUDENT IN AN ORGANIZED HEALTH CARE EDUCATION/TRAINING PROGRAM

## 2024-11-22 PROCEDURE — 84439 ASSAY OF FREE THYROXINE: CPT | Performed by: STUDENT IN AN ORGANIZED HEALTH CARE EDUCATION/TRAINING PROGRAM

## 2024-11-22 PROCEDURE — 86800 THYROGLOBULIN ANTIBODY: CPT | Performed by: STUDENT IN AN ORGANIZED HEALTH CARE EDUCATION/TRAINING PROGRAM

## 2024-11-25 DIAGNOSIS — C73 THYROID CANCER: Primary | ICD-10-CM

## 2024-11-25 DIAGNOSIS — E89.0 S/P TOTAL THYROIDECTOMY: ICD-10-CM

## 2024-11-25 LAB
THRYOGLOBULIN INTERPRETATION: ABNORMAL
THYROGLOB AB SERPL-ACNC: <1.8 IU/ML
THYROGLOB SERPL-MCNC: 0.5 NG/ML

## 2024-11-26 ENCOUNTER — PATIENT MESSAGE (OUTPATIENT)
Dept: SURGERY | Facility: CLINIC | Age: 49
End: 2024-11-26
Payer: COMMERCIAL

## 2024-11-26 DIAGNOSIS — C73 THYROID CANCER: Primary | ICD-10-CM

## 2024-11-26 DIAGNOSIS — E89.0 S/P TOTAL THYROIDECTOMY: ICD-10-CM

## 2024-11-26 RX ORDER — LEVOTHYROXINE SODIUM 125 UG/1
125 TABLET ORAL
Qty: 30 TABLET | Refills: 11 | Status: SHIPPED | OUTPATIENT
Start: 2024-11-26 | End: 2025-11-26

## 2024-11-26 NOTE — PROGRESS NOTES
Multiple thyroid nodules bilaterally with 4.1 cm dominant left inferior nodule that had prior FLUS/AUS cytology and positive molecular markers s/p total thyroidectomy with excision of substernal thyroid goiter on 10/11/224.  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  No lymphovascular invasion.     Thyroglobulin low, TSH suppressed on 137 mcg daily, Vitamin D insufficient.  CEA in normal range.  Calcitonin discontinued by lab, specimen hemolyzed.     - Recheck calcitonin (ok to get at 6 weeks with TSH or sooner per patient preference)  - Adjust levothyroxine to 125 mcg daily (new Rx sent)  - Check TSH in 6 weeks  - 2000 IU over the counter vitamin D3 daily

## 2024-12-16 RX ORDER — LEVOTHYROXINE SODIUM 125 UG/1
125 TABLET ORAL
Qty: 30 TABLET | Refills: 11 | Status: SHIPPED | OUTPATIENT
Start: 2024-12-16 | End: 2025-12-16

## 2025-01-15 ENCOUNTER — OFFICE VISIT (OUTPATIENT)
Dept: INTERNAL MEDICINE | Facility: CLINIC | Age: 50
End: 2025-01-15
Payer: COMMERCIAL

## 2025-01-15 ENCOUNTER — IMMUNIZATION (OUTPATIENT)
Dept: INTERNAL MEDICINE | Facility: CLINIC | Age: 50
End: 2025-01-15
Payer: COMMERCIAL

## 2025-01-15 ENCOUNTER — LAB VISIT (OUTPATIENT)
Dept: LAB | Facility: HOSPITAL | Age: 50
End: 2025-01-15
Attending: INTERNAL MEDICINE
Payer: COMMERCIAL

## 2025-01-15 VITALS
SYSTOLIC BLOOD PRESSURE: 122 MMHG | BODY MASS INDEX: 30.49 KG/M2 | OXYGEN SATURATION: 98 % | WEIGHT: 194.25 LBS | HEIGHT: 67 IN | HEART RATE: 81 BPM | DIASTOLIC BLOOD PRESSURE: 80 MMHG

## 2025-01-15 DIAGNOSIS — C73 PAPILLARY CARCINOMA, FOLLICULAR VARIANT: ICD-10-CM

## 2025-01-15 DIAGNOSIS — E78.00 PURE HYPERCHOLESTEROLEMIA: ICD-10-CM

## 2025-01-15 DIAGNOSIS — E89.0 POST-SURGICAL HYPOTHYROIDISM: ICD-10-CM

## 2025-01-15 DIAGNOSIS — R73.03 PREDIABETES: ICD-10-CM

## 2025-01-15 DIAGNOSIS — Z23 NEED FOR VACCINATION: Primary | ICD-10-CM

## 2025-01-15 DIAGNOSIS — Z00.00 ENCOUNTER FOR ANNUAL PHYSICAL EXAM: Primary | ICD-10-CM

## 2025-01-15 DIAGNOSIS — E89.0 S/P TOTAL THYROIDECTOMY: ICD-10-CM

## 2025-01-15 DIAGNOSIS — I10 ESSENTIAL HYPERTENSION: ICD-10-CM

## 2025-01-15 LAB
ANION GAP SERPL CALC-SCNC: 7 MMOL/L (ref 8–16)
BUN SERPL-MCNC: 11 MG/DL (ref 6–20)
CALCIUM SERPL-MCNC: 9.8 MG/DL (ref 8.7–10.5)
CHLORIDE SERPL-SCNC: 109 MMOL/L (ref 95–110)
CO2 SERPL-SCNC: 26 MMOL/L (ref 23–29)
CREAT SERPL-MCNC: 0.9 MG/DL (ref 0.5–1.4)
EST. GFR  (NO RACE VARIABLE): >60 ML/MIN/1.73 M^2
ESTIMATED AVG GLUCOSE: 120 MG/DL (ref 68–131)
GLUCOSE SERPL-MCNC: 91 MG/DL (ref 70–110)
HBA1C MFR BLD: 5.8 % (ref 4–5.6)
POTASSIUM SERPL-SCNC: 3.9 MMOL/L (ref 3.5–5.1)
SODIUM SERPL-SCNC: 142 MMOL/L (ref 136–145)

## 2025-01-15 PROCEDURE — 90480 ADMN SARSCOV2 VAC 1/ONLY CMP: CPT | Mod: S$GLB,,, | Performed by: INTERNAL MEDICINE

## 2025-01-15 PROCEDURE — 91320 SARSCV2 VAC 30MCG TRS-SUC IM: CPT | Mod: S$GLB,,, | Performed by: INTERNAL MEDICINE

## 2025-01-15 PROCEDURE — 3008F BODY MASS INDEX DOCD: CPT | Mod: CPTII,S$GLB,, | Performed by: INTERNAL MEDICINE

## 2025-01-15 PROCEDURE — 3074F SYST BP LT 130 MM HG: CPT | Mod: CPTII,S$GLB,, | Performed by: INTERNAL MEDICINE

## 2025-01-15 PROCEDURE — 80048 BASIC METABOLIC PNL TOTAL CA: CPT | Performed by: INTERNAL MEDICINE

## 2025-01-15 PROCEDURE — 3079F DIAST BP 80-89 MM HG: CPT | Mod: CPTII,S$GLB,, | Performed by: INTERNAL MEDICINE

## 2025-01-15 PROCEDURE — 99396 PREV VISIT EST AGE 40-64: CPT | Mod: S$GLB,,, | Performed by: INTERNAL MEDICINE

## 2025-01-15 PROCEDURE — 4010F ACE/ARB THERAPY RXD/TAKEN: CPT | Mod: CPTII,S$GLB,, | Performed by: INTERNAL MEDICINE

## 2025-01-15 PROCEDURE — 83036 HEMOGLOBIN GLYCOSYLATED A1C: CPT | Performed by: INTERNAL MEDICINE

## 2025-01-15 PROCEDURE — 99999 PR PBB SHADOW E&M-EST. PATIENT-LVL III: CPT | Mod: PBBFAC,,, | Performed by: INTERNAL MEDICINE

## 2025-01-15 PROCEDURE — 1159F MED LIST DOCD IN RCRD: CPT | Mod: CPTII,S$GLB,, | Performed by: INTERNAL MEDICINE

## 2025-01-15 RX ORDER — LOSARTAN POTASSIUM 25 MG/1
25 TABLET ORAL DAILY
Qty: 90 TABLET | Refills: 3 | Status: SHIPPED | OUTPATIENT
Start: 2025-01-15

## 2025-01-15 RX ORDER — ROSUVASTATIN CALCIUM 20 MG/1
20 TABLET, COATED ORAL DAILY
Qty: 90 TABLET | Refills: 3 | Status: SHIPPED | OUTPATIENT
Start: 2025-01-15

## 2025-01-15 NOTE — PROGRESS NOTES
Subjective:       Patient ID: Talon Leblanc Jr. is a 49 y.o. male.    Chief Complaint: Annual Exam      HPI  Talon Leblanc Jr. is a 49 y.o. year old male with HTN, prediabetes, HLD, papillary thyroid carcinoma s/p total thyroidectomy with post surgical hypothyroidism presents for annual exam. No complaints today, doing well.     Review of Systems   Constitutional:  Negative for activity change, appetite change, chills, fatigue, fever and unexpected weight change.   HENT:  Negative for congestion, rhinorrhea and sore throat.    Eyes:  Negative for visual disturbance.   Respiratory:  Negative for shortness of breath.    Cardiovascular:  Negative for chest pain.   Gastrointestinal:  Negative for abdominal pain, diarrhea, nausea and vomiting.   Genitourinary:  Negative for difficulty urinating and dysuria.   Musculoskeletal:  Negative for arthralgias, back pain and myalgias.   Skin:  Negative for color change and rash.   Neurological:  Negative for dizziness, weakness and headaches.         Past Medical History:   Diagnosis Date    Glaucoma     Hypertension     Multinodular goiter (nontoxic) 02/25/2019    Multiple thyroid nodules bilaterally with 4.1 cm dominant left inferior nodule that had prior FLUS/AUS cytology and positive molecular markers s/p total thyroidectomy with excision of substernal thyroid goiter on 10/11/224.  Final pathology demonstrated a left 2.1 cm encapsulated follicular variant of papillary thyroid carcinoma, a left < 0.5 mm medullary thyroid microcarcinoma and two follicular     Multiple thyroid nodules     Prediabetes         Prior to Admission medications    Medication Sig Start Date End Date Taking? Authorizing Provider   latanoprost 0.005 % ophthalmic solution Inject 2 drops into the eye every evening.   Yes Provider, Historical   levothyroxine (SYNTHROID) 125 MCG tablet Take 1 tablet (125 mcg total) by mouth before breakfast. 12/16/24 12/16/25 Yes Debbie Gu MD  "  losartan (COZAAR) 25 MG tablet Take 1 tablet (25 mg total) by mouth once daily. 5/8/24 1/15/25 Yes Giancarlo Covarrubias MD   rosuvastatin (CRESTOR) 20 MG tablet Take 1 tablet (20 mg total) by mouth once daily. 5/8/24 1/15/25 Yes Giancarlo Covarrubias MD   losartan (COZAAR) 25 MG tablet Take 1 tablet (25 mg total) by mouth once daily. 1/15/25   Giancarlo Covarrubias MD   rosuvastatin (CRESTOR) 20 MG tablet Take 1 tablet (20 mg total) by mouth once daily. 1/15/25   Giancarlo Covarrubias MD        Past medical history, surgical history, and family medical history reviewed and updated as appropriate.    Medications and allergies reviewed.     Objective:          Vitals:    01/15/25 1400   BP: 122/80   BP Location: Right arm   Patient Position: Sitting   Pulse: 81   SpO2: 98%   Weight: 88.1 kg (194 lb 3.6 oz)   Height: 5' 7" (1.702 m)     Body mass index is 30.42 kg/m².  Physical Exam  Constitutional:       General: He is not in acute distress.     Appearance: He is well-developed.   HENT:      Head: Normocephalic and atraumatic.      Nose: Nose normal.   Eyes:      General: No scleral icterus.     Extraocular Movements: Extraocular movements intact.   Neck:      Thyroid: No thyromegaly.      Vascular: No JVD.      Trachea: No tracheal deviation.   Cardiovascular:      Rate and Rhythm: Normal rate and regular rhythm.      Heart sounds: Normal heart sounds. No murmur heard.     No friction rub. No gallop.   Pulmonary:      Effort: Pulmonary effort is normal. No respiratory distress.      Breath sounds: Normal breath sounds. No wheezing or rales.   Abdominal:      General: Bowel sounds are normal. There is no distension.      Palpations: Abdomen is soft. There is no mass.      Tenderness: There is no abdominal tenderness.   Musculoskeletal:         General: No tenderness. Normal range of motion.      Cervical back: Normal range of motion and neck supple.   Lymphadenopathy:      Cervical: No cervical adenopathy.   Skin:     General: Skin is warm and dry. "      Findings: No rash.   Neurological:      General: No focal deficit present.      Mental Status: He is alert and oriented to person, place, and time.      Cranial Nerves: No cranial nerve deficit.      Deep Tendon Reflexes: Reflexes normal.   Psychiatric:         Behavior: Behavior normal.         Lab Results   Component Value Date    WBC 8.70 08/02/2024    HGB 13.6 (L) 08/02/2024    HCT 41.8 08/02/2024     08/02/2024    CHOL 214 (H) 04/22/2024    TRIG 161 (H) 04/22/2024    HDL 38 (L) 04/22/2024    ALT 24 08/02/2024    ALT 24 08/02/2024    AST 19 08/02/2024    AST 19 08/02/2024     10/22/2024    K 4.2 10/22/2024     10/22/2024    CREATININE 1.0 10/22/2024    BUN 13 10/22/2024    CO2 22 (L) 10/22/2024    TSH 0.129 (L) 11/22/2024    HGBA1C 5.9 (H) 08/02/2024       Assessment:       1. Encounter for annual physical exam    2. Essential hypertension    3. Prediabetes    4. Pure hypercholesterolemia    5. Post-surgical hypothyroidism    6. S/P total thyroidectomy    7. Papillary carcinoma, follicular variant          Plan:     Talon was seen today for annual exam.    Diagnoses and all orders for this visit:    Encounter for annual physical exam    Essential hypertension  Comments:  bp 122/80 today, on losartan. at home 120-130s/70s. controlled, no changes to current management  Orders:  -     losartan (COZAAR) 25 MG tablet; Take 1 tablet (25 mg total) by mouth once daily.    Prediabetes  Comments:  last a1c 5.9, lifestyle controlled, recheck a1c, no changes to current management  Orders:  -     losartan (COZAAR) 25 MG tablet; Take 1 tablet (25 mg total) by mouth once daily.  -     HEMOGLOBIN A1C; Future  -     BASIC METABOLIC PANEL; Future    Pure hypercholesterolemia  Comments:  takes crestor 20 daily, no a/e noted. continue, recheck lipid panel annually  Orders:  -     rosuvastatin (CRESTOR) 20 MG tablet; Take 1 tablet (20 mg total) by mouth once daily.    Post-surgical hypothyroidism    S/P  total thyroidectomy    Papillary carcinoma, follicular variant  Comments:  s/p total thyroidectomy, now on levothyroxine 125 mcg every morning        Health maintenance reviewed with patient. Patient is due for covid-19 vaccination (today)    Follow up in about 6 months (around 7/15/2025).    Giancarlo Covarrubias MD  Internal Medicine / Primary Care  [facility]  1/15/2025

## 2025-01-15 NOTE — PATIENT INSTRUCTIONS
Covid-19 vaccination today  Labwork today    Refills of medications sent to pharmacy.     Return to clinic in 6 months for follow up.

## 2025-02-12 ENCOUNTER — PATIENT MESSAGE (OUTPATIENT)
Dept: SURGERY | Facility: CLINIC | Age: 50
End: 2025-02-12
Payer: COMMERCIAL

## 2025-02-14 ENCOUNTER — LAB VISIT (OUTPATIENT)
Dept: LAB | Facility: HOSPITAL | Age: 50
End: 2025-02-14
Attending: STUDENT IN AN ORGANIZED HEALTH CARE EDUCATION/TRAINING PROGRAM
Payer: COMMERCIAL

## 2025-02-14 DIAGNOSIS — E89.0 S/P TOTAL THYROIDECTOMY: ICD-10-CM

## 2025-02-14 DIAGNOSIS — C73 THYROID CANCER: ICD-10-CM

## 2025-02-14 LAB — TSH SERPL DL<=0.005 MIU/L-ACNC: 1.7 UIU/ML (ref 0.4–4)

## 2025-02-14 PROCEDURE — 36415 COLL VENOUS BLD VENIPUNCTURE: CPT | Performed by: STUDENT IN AN ORGANIZED HEALTH CARE EDUCATION/TRAINING PROGRAM

## 2025-02-14 PROCEDURE — 84443 ASSAY THYROID STIM HORMONE: CPT | Performed by: STUDENT IN AN ORGANIZED HEALTH CARE EDUCATION/TRAINING PROGRAM

## 2025-02-14 PROCEDURE — 82308 ASSAY OF CALCITONIN: CPT | Performed by: STUDENT IN AN ORGANIZED HEALTH CARE EDUCATION/TRAINING PROGRAM

## 2025-02-19 ENCOUNTER — RESULTS FOLLOW-UP (OUTPATIENT)
Dept: SURGERY | Facility: CLINIC | Age: 50
End: 2025-02-19
Payer: COMMERCIAL

## 2025-02-19 DIAGNOSIS — C73 THYROID CANCER: ICD-10-CM

## 2025-02-19 DIAGNOSIS — E89.0 S/P TOTAL THYROIDECTOMY: Primary | ICD-10-CM

## 2025-02-20 ENCOUNTER — LAB VISIT (OUTPATIENT)
Dept: LAB | Facility: HOSPITAL | Age: 50
End: 2025-02-20
Attending: STUDENT IN AN ORGANIZED HEALTH CARE EDUCATION/TRAINING PROGRAM
Payer: COMMERCIAL

## 2025-02-20 DIAGNOSIS — C73 THYROID CANCER: ICD-10-CM

## 2025-02-20 DIAGNOSIS — E89.0 S/P TOTAL THYROIDECTOMY: ICD-10-CM

## 2025-02-20 PROCEDURE — 36415 COLL VENOUS BLD VENIPUNCTURE: CPT | Performed by: STUDENT IN AN ORGANIZED HEALTH CARE EDUCATION/TRAINING PROGRAM

## 2025-02-20 PROCEDURE — 82308 ASSAY OF CALCITONIN: CPT | Performed by: STUDENT IN AN ORGANIZED HEALTH CARE EDUCATION/TRAINING PROGRAM

## 2025-02-20 NOTE — PROGRESS NOTES
Multiple thyroid nodules bilaterally with 4.1 cm dominant left inferior nodule that had prior FLUS/AUS cytology and positive molecular markers s/p total thyroidectomy with excision of substernal thyroid goiter on 10/11/224.  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  No lymphovascular invasion.      Thyroglobulin low. TSH normalized on 125 mcg daily, Vitamin D insufficient.  CEA in normal range.  Calcitonin specimen again hemolyzed.      - Recheck calcitonin   - Keep levothyroxine to 125 mcg daily  - 2000 IU over the counter vitamin D3 daily  - Keep upcoming endocrinology appointment  (Dr. Daniels / [facility]) for ongoing surveillance

## 2025-02-22 LAB — CALCIT SERPL-MCNC: <5 PG/ML

## 2025-02-24 ENCOUNTER — RESULTS FOLLOW-UP (OUTPATIENT)
Dept: SURGERY | Facility: CLINIC | Age: 50
End: 2025-02-24
Payer: COMMERCIAL

## 2025-02-24 NOTE — PROGRESS NOTES
Multiple thyroid nodules bilaterally with 4.1 cm dominant left inferior nodule that had prior FLUS/AUS cytology and positive molecular markers s/p total thyroidectomy with excision of substernal thyroid goiter on 10/11/224.  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  No lymphovascular invasion.      Thyroglobulin low. TSH normalized on 125 mcg daily, Vitamin D insufficient.  CEA in normal range and calcitonin undetectable.     - Follow up with endocrinology as scheduled  - Keep levothyroxine to 125 mcg daily  - 2000 IU over the counter vitamin D3 daily

## 2025-03-12 NOTE — PROGRESS NOTES
Subjective:      Patient ID: Talon Leblanc Jr. is a 49 y.o. male.    Chief Complaint:  Thyroid cancer    History of Present Illness  Talon Leblanc Jr. presents for follow up evaluation of thyroid cancer s/p thyroidectomy.      Patient had thyroid nodule found in 2008. He went to endo at [facility] and reports he had the nodule drained and was lost to follow up. He saw Dr Rolle in 2019. He had biopsies of left dominant nodule and isthmus nodule (both with benign results) in 2/2019,  and was lost to follow up.     Thyroid u/s was repeated in 6/2023:  Impression:  Bilateral and isthmus TR 4 to TR 5 nodules with 1 of them increased size compared to 2018.  Reported prior biopsy with no clear results.  Recommend repeat FNA.  Left lobe TR 3 nodule not well delineated in prior study, requiring follow-up at 1, 3 and 5 year per ACR TI-RADS criteria.    FNA 12/2023 AUS and 4/2024 FLUS of left inferior nodule   Molecular markers:  positive for NRAS Q61R and positive ThyraMir V2 , 95% risk of malignancy.    He was referred to endocrine surgery and underwent total thyroidectomy with excision of substernal thyroid goiter by Dr. Gu on 10/11/2024.    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 No lymphovascular invasion.     Post-op he was started on lt4 137 mcg daily, decreased to 125 mcg daily after TSH was found to be 0.129. Endorses compliance with medication, takes first thing in the morning before food but takes it with losartan and crestor.  Was on vit d 2000 IU daily post-op, not taking anymore.      No FH of thyroid cancer  No personal history of radiation treatment or exposure     No difficulty breathing swallowing   No voice changes  No frequent clearing throat or hoarseness   No surgical site pain/tenderness.    No signs or symptoms of hyper or  hypothyroidism    No weight changes  No bowel changes  No heat or cold intolerance   No hair nail or skin changes  No cp, palpations or sob      Lab Results   Component Value Date    TSH 1.695 02/14/2025    TSH 0.129 (L) 11/22/2024    TSH 0.989 08/02/2024    TSH 0.660 04/28/2023    TSH 0.889 11/03/2021    FREET4 1.24 11/22/2024    FREET4 0.90 03/14/2011    THYGLBTUM 0.5 (H) 11/22/2024    CALCITONIN <5.0 02/20/2025    CEA 2.7 11/22/2024       Lab Results   Component Value Date    [id number] 21 (L) 11/22/2024    [id number] 25 (L) 03/14/2011       Regarding prediabetes:  FH of diabetes in father  Never been on medications for diabetes  Managing with lifestyle modifications.    Lab Results   Component Value Date    HGBA1C 5.8 (H) 01/15/2025     Regarding dyslipidemia:  He is on Crestor 10 mg daily     Latest Reference Range & Units 04/28/23 09:48 04/22/24 08:53   Cholesterol Total 120 - 199 mg/dL 243 (H) 214 (H)   HDL 40 - 75 mg/dL 40 38 (L)   HDL/Cholesterol Ratio 20.0 - 50.0 % 16.5 (L) 17.8 (L)   Non-HDL Cholesterol mg/dL 203 176   Total Cholesterol/HDL Ratio 2.0 - 5.0  6.1 (H) 5.6 (H)   Triglycerides 30 - 150 mg/dL 194 (H) 161 (H)   LDL Cholesterol 63.0 - 159.0 mg/dL 164.2 (H) 143.8   (H): Data is abnormally high  (L): Data is abnormally low    Review of Systems   Constitutional:  Negative for fatigue and unexpected weight change.   Eyes:  Negative for visual disturbance.   Endocrine: Negative for polydipsia, polyphagia and polyuria.   And as above     Objective:   Physical Exam  Constitutional:       General: He is not in acute distress.     Comments: BMI-31   Eyes:      Extraocular Movements: Extraocular movements intact.   Neck:      Thyroid: Thyromegaly present.      Comments: Well-healed surgical incision, no tenderness  No lymphadenopathy  Cardiovascular:      Rate and Rhythm: Normal rate.   Pulmonary:      Effort: Pulmonary effort is normal.   Neurological:      Mental Status: He is oriented to person,  place, and time.   Psychiatric:         Behavior: Behavior normal.           Lab Review:   Lab Results   Component Value Date    HGBA1C 5.8 (H) 01/15/2025    HGBA1C 5.9 (H) 08/02/2024    HGBA1C 6.0 (H) 04/22/2024      Lab Results   Component Value Date    CHOL 214 (H) 04/22/2024    HDL 38 (L) 04/22/2024    LDLCALC 143.8 04/22/2024    TRIG 161 (H) 04/22/2024    CHOLHDL 17.8 (L) 04/22/2024     Lab Results   Component Value Date     01/15/2025    K 3.9 01/15/2025     01/15/2025    CO2 26 01/15/2025    GLU 91 01/15/2025    BUN 11 01/15/2025    CREATININE 0.9 01/15/2025    CALCIUM 9.8 01/15/2025    PROT 7.2 08/02/2024    PROT 7.2 08/02/2024    ALBUMIN 4.3 10/22/2024    BILITOT 1.0 08/02/2024    BILITOT 1.0 08/02/2024    ALKPHOS 70 08/02/2024    ALKPHOS 70 08/02/2024    AST 19 08/02/2024    AST 19 08/02/2024    ALT 24 08/02/2024    ALT 24 08/02/2024    ANIONGAP 7 (L) 01/15/2025    ESTGFRAFRICA >60.0 11/03/2021    EGFRNONAA >60.0 11/03/2021    TSH 1.695 02/14/2025     Vit D, 25-Hydroxy   Date Value Ref Range Status   11/22/2024 21 (L) 30 - 96 ng/mL Final     Comment:     Vitamin D deficiency.........<10 ng/mL                              Vitamin D insufficiency......10-29 ng/mL       Vitamin D sufficiency........> or equal to 30 ng/mL  Vitamin D toxicity............>100 ng/mL       Assessment and Plan     Papillary carcinoma, follicular variant    Bilateral thyroid nodules with 4.1 cm dominant left inferior nodule that had prior AUS/FLUS cytology and positive molecular markers s/p total thyroidectomy with excision of substernal thyroid goiter on 10/11/224.     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 No lymphovascular invasion.     MAN low risk for differentiated thyroid cancer.    Post-op TG 0.5.  Repeat TG every 6 months for 1 year.  6 months post-op u/s scheduled for  4/10.      Medullary thyroid carcinoma    History as above.  Post-op CEA normal, calcitonin undetectable.  Repeat calcitonin every 6 months for 1 year.  Ordered genetic testing for RET gene mutation.  6 months post-op U/s scheduled for 4/10.      Post-surgical hypothyroidism    TSH goal 0.5-2.0, would keep closer to 0.5.  Last TSH 1.6  Continue LT4 125 mcg daily, add an extra half tablet once a week (total 7.5 tablets weekly).  Repeat TSH in one month.      Vitamin D insufficiency    Was on vit d3 2000 IU daily post-op, not taking anymore.  Check vit d levels      RTC 1 year    Patient seen, examined and discussed with Dr. Rolle       Visit today included increased complexity associated with the care of the problems addressed and managing the longitudinal care of the patient due to the serious and/or complex managed problems

## 2025-03-14 ENCOUNTER — OFFICE VISIT (OUTPATIENT)
Dept: ENDOCRINOLOGY | Facility: CLINIC | Age: 50
End: 2025-03-14
Payer: COMMERCIAL

## 2025-03-14 VITALS
HEIGHT: 67 IN | DIASTOLIC BLOOD PRESSURE: 66 MMHG | BODY MASS INDEX: 31.44 KG/M2 | WEIGHT: 200.31 LBS | HEART RATE: 68 BPM | SYSTOLIC BLOOD PRESSURE: 118 MMHG

## 2025-03-14 DIAGNOSIS — C73 PAPILLARY CARCINOMA, FOLLICULAR VARIANT: Primary | ICD-10-CM

## 2025-03-14 DIAGNOSIS — E89.0 POST-SURGICAL HYPOTHYROIDISM: ICD-10-CM

## 2025-03-14 DIAGNOSIS — E55.9 VITAMIN D DEFICIENCY: ICD-10-CM

## 2025-03-14 DIAGNOSIS — C73 MEDULLARY THYROID CARCINOMA: ICD-10-CM

## 2025-03-14 DIAGNOSIS — E55.9 VITAMIN D INSUFFICIENCY: ICD-10-CM

## 2025-03-14 PROCEDURE — 99999 PR PBB SHADOW E&M-EST. PATIENT-LVL IV: CPT | Mod: PBBFAC,,,

## 2025-03-14 NOTE — ASSESSMENT & PLAN NOTE
History as above.  Post-op CEA normal, calcitonin undetectable.  Repeat calcitonin every 6 months for 1 year.  Ordered genetic testing for RET gene mutation.  6 months post-op U/s scheduled for 4/10.

## 2025-03-14 NOTE — PATIENT INSTRUCTIONS
Continue levothyroxine 125 mcg daily and take an extra half a tablet on Sundays (total 7.5 tablets in a week)

## 2025-03-14 NOTE — ASSESSMENT & PLAN NOTE
Bilateral thyroid nodules with 4.1 cm dominant left inferior nodule that had prior AUS/FLUS cytology and positive molecular markers s/p total thyroidectomy with excision of substernal thyroid goiter on 10/11/224.     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 No lymphovascular invasion.     MAN low risk for differentiated thyroid cancer.    Post-op TG 0.5.  Repeat TG every 6 months for 1 year.  6 months post-op u/s scheduled for 4/10.

## 2025-03-14 NOTE — ASSESSMENT & PLAN NOTE
TSH goal 0.5-2.0, would keep closer to 0.5.  Last TSH 1.6  Continue LT4 125 mcg daily, add an extra half tablet once a week (total 7.5 tablets weekly).  Repeat TSH in one month.

## 2025-03-14 NOTE — PROGRESS NOTES
I have reviewed and concur with the fellow's history, assessment, and plan.  I have personally interviewed the patient and all questions were answered.

## 2025-04-10 ENCOUNTER — HOSPITAL ENCOUNTER (OUTPATIENT)
Dept: ENDOCRINOLOGY | Facility: CLINIC | Age: 50
Discharge: HOME | End: 2025-04-10
Attending: STUDENT IN AN ORGANIZED HEALTH CARE EDUCATION/TRAINING PROGRAM
Payer: COMMERCIAL

## 2025-04-10 ENCOUNTER — PATIENT MESSAGE (OUTPATIENT)
Dept: SURGERY | Facility: CLINIC | Age: 50
End: 2025-04-10

## 2025-04-10 ENCOUNTER — OFFICE VISIT (OUTPATIENT)
Dept: SURGERY | Facility: CLINIC | Age: 50
End: 2025-04-10
Payer: COMMERCIAL

## 2025-04-10 ENCOUNTER — LAB VISIT (OUTPATIENT)
Dept: LAB | Facility: HOSPITAL | Age: 50
End: 2025-04-10
Payer: COMMERCIAL

## 2025-04-10 VITALS
BODY MASS INDEX: 31.11 KG/M2 | OXYGEN SATURATION: 100 % | SYSTOLIC BLOOD PRESSURE: 122 MMHG | HEIGHT: 67 IN | DIASTOLIC BLOOD PRESSURE: 75 MMHG | HEART RATE: 68 BPM | WEIGHT: 198.19 LBS

## 2025-04-10 DIAGNOSIS — C73 PAPILLARY CARCINOMA, FOLLICULAR VARIANT: ICD-10-CM

## 2025-04-10 DIAGNOSIS — E89.0 POST-SURGICAL HYPOTHYROIDISM: ICD-10-CM

## 2025-04-10 DIAGNOSIS — C73 MEDULLARY THYROID CARCINOMA: ICD-10-CM

## 2025-04-10 DIAGNOSIS — Z98.890 S/P TOTAL THYROIDECTOMY: Primary | ICD-10-CM

## 2025-04-10 DIAGNOSIS — E55.9 VITAMIN D DEFICIENCY: ICD-10-CM

## 2025-04-10 DIAGNOSIS — Z98.890 S/P TOTAL THYROIDECTOMY: ICD-10-CM

## 2025-04-10 DIAGNOSIS — Z90.89 S/P TOTAL THYROIDECTOMY: Primary | ICD-10-CM

## 2025-04-10 DIAGNOSIS — Z90.89 S/P TOTAL THYROIDECTOMY: ICD-10-CM

## 2025-04-10 DIAGNOSIS — C73 THYROID CANCER: ICD-10-CM

## 2025-04-10 LAB
25(OH)D3+25(OH)D2 SERPL-MCNC: 14 NG/ML (ref 30–96)
T4 FREE SERPL-MCNC: 0.87 NG/DL (ref 0.71–1.51)
TSH SERPL-ACNC: 4.69 UIU/ML (ref 0.4–4)

## 2025-04-10 PROCEDURE — 76536 US EXAM OF HEAD AND NECK: CPT | Mod: S$GLB,,, | Performed by: INTERNAL MEDICINE

## 2025-04-10 PROCEDURE — 84443 ASSAY THYROID STIM HORMONE: CPT

## 2025-04-10 PROCEDURE — 82308 ASSAY OF CALCITONIN: CPT

## 2025-04-10 PROCEDURE — 84432 ASSAY OF THYROGLOBULIN: CPT

## 2025-04-10 PROCEDURE — 36415 COLL VENOUS BLD VENIPUNCTURE: CPT

## 2025-04-10 PROCEDURE — 99999 PR PBB SHADOW E&M-EST. PATIENT-LVL III: CPT | Mod: PBBFAC,,, | Performed by: STUDENT IN AN ORGANIZED HEALTH CARE EDUCATION/TRAINING PROGRAM

## 2025-04-10 PROCEDURE — 82306 VITAMIN D 25 HYDROXY: CPT

## 2025-04-10 PROCEDURE — 84439 ASSAY OF FREE THYROXINE: CPT

## 2025-04-10 RX ORDER — LEVOTHYROXINE SODIUM 125 UG/1
TABLET ORAL
Qty: 36 TABLET | Refills: 11 | Status: SHIPPED | OUTPATIENT
Start: 2025-04-10 | End: 2026-04-10

## 2025-04-10 NOTE — ASSESSMENT & PLAN NOTE
- Reviewed TSH and new dosing.  He has not started taking the extra half tab weekly.  He decided Friday would be a good day for him to take an extra tablet. We reviewed his daily schedule and provided him instructions that make sense with his schedule.     - Take levothyroxine once a day in the AM before work, around 5 am  - On Fridays take 1.5 tablets in the AM before work  - After work take other medications with breakfast, around 10 am  - If a dose is missed, it is OK to take it about an hour before lunch (around 11 am or noon)  - If he misses one day, he'll take two pills the next morning  - If he misses multiple days, he'll take two pills the morning he remembers then resume the schedule above  - Will check TSH 6-8 weeks from today with an office visit to review

## 2025-04-10 NOTE — ASSESSMENT & PLAN NOTE
Surveillance ultrasound reviewed, no residual malignancy.      - Continue to follow up with Endocrinology

## 2025-04-10 NOTE — PROGRESS NOTES
Endocrine Surgery Clinic Note    Reason for visit / Chief complaint: Postoperative evaluation  Endocrinologist: Siomara Azar NP  Procedure:  THYROIDECTOMY Total, Excision of substernal thyroid goiter  Procedure Date: 10/11/2024    Subjective:     Interval History: Mr. 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     Since his last visit he has been doing well.  He saw endocrinology and adjustments were made to his levothyroxine schedule, though he hasn't started taking the extra half tab weekly. We reviewed his usual daily schedule and figured out a schedule to take the levothyroxine and his other medications.     HPI from initial post-op visit (11/2024) Talon Leblanc Jr. returns today for postoperative evaluation and to review the pathology.  He has been doing well otherwise and has been compliant with the levothyroxine as prescribed.      Procedure:   Total thyroidectomy, excision of substernal multinodular thyroid goiter via cervical incision  Intraoperative monitoring and interpretation of bilateral cranial nerves (vagus and recurrent laryngeal nerves) using the Neurovision Cobra EMG endotracheal tube and Cardiorobotics-Response 3.0 system    Operative findings:   Substernal left inferior thyroid nodule, no suspicious features.   Firm right mid thyroid nodule with possible extrathyroidal extension with adherent overlying sternothyroid muscle.  The bilateral recurrent laryngeal and vagus nerves were identified and preserved.  Function was verified using the nerve monitoring system.      Current Outpatient Medications   Medication Sig Dispense Refill    latanoprost 0.005 % ophthalmic solution Inject 2 drops into the eye every evening.      losartan (COZAAR) 25 MG tablet Take 1 tablet (25 mg total) by  "mouth once daily. 90 tablet 3    rosuvastatin (CRESTOR) 20 MG tablet Take 1 tablet (20 mg total) by mouth once daily. 90 tablet 3    levothyroxine (SYNTHROID) 125 MCG tablet Take 1 tablet (125 mcg total) by mouth every morning AND 1.5 tablets (187.5 mcg total) Every Friday. Take 1 tablet daily, take 1.5 tablets on Friday. 36 tablet 11     No current facility-administered medications for this visit.     Facility-Administered Medications Ordered in Other Visits   Medication Dose Route Frequency Provider Last Rate Last Admin    0.9%  NaCl infusion   Intravenous Continuous Serafin Avina MD 10 mL/hr at 08/19/22 0830 New Bag at 08/19/22 0830    LIDOcaine (PF) 10 mg/ml (1%) injection 10 mg  1 mL Intradermal Once Serafin Avina MD        mupirocin 2 % ointment   Nasal On Call Procedure Sharon Tirado MD   Given at 08/19/22 0830     Review of patient's allergies indicates:  No Known Allergies     Review of Systems  Negative except as per HPI.     Objective:   /75   Pulse 68   Ht 5' 7" (1.702 m)   Wt 89.9 kg (198 lb 3.1 oz)   SpO2 100%   BMI 31.04 kg/m²     General: alert, well appearing, and in no distress  Neck: neck is flat, no erythema or edema  Incision: well approximated, healing well  Neurological: voice is strong    Labs:  Lab Results   Component Value Date    CALCIUM 9.8 01/15/2025    ALBUMIN 4.3 10/22/2024    PTH 43.7 10/22/2024    PHOS 3.8 10/22/2024    [id number] 14 (L) 04/10/2025       Pathology  Final Pathologic Diagnosis   Date Value Ref Range Status   10/11/2024   Corrected    Thyroid, total thyroidectomy:   Pending consultation with [facility]    Final diagnosis with additional information will be provided in a supplemental report.       Comment:     Interp By Suyapa Hanna M.D., Signed on 11/15/2024 at 13:11         Assessment and Plan:     1. S/P total thyroidectomy  Overview:  Multiple thyroid nodules bilaterally with 4.1 cm dominant left inferior nodule that had " prior FLUS/AUS cytology and positive molecular markers s/p total thyroidectomy with excision of substernal thyroid goiter on 10/11/224.  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  No lymphovascular invasion.     Assessment & Plan:  Surveillance ultrasound reviewed, no residual malignancy.      - Continue to follow up with Endocrinology     Orders:  -     TSH; Future; Expected date: 05/10/2025    2. Thyroid cancer  -     TSH; Future; Expected date: 05/10/2025    3. Post-surgical hypothyroidism  Assessment & Plan:  - Reviewed TSH and new dosing.  He has not started taking the extra half tab weekly.  He decided Friday would be a good day for him to take an extra tablet. We reviewed his daily schedule and provided him instructions that make sense with his schedule.     - Take levothyroxine once a day in the AM before work, around 5 am  - On Fridays take 1.5 tablets in the AM before work  - After work take other medications with breakfast, around 10 am  - If a dose is missed, it is OK to take it about an hour before lunch (around 11 am or noon)  - If he misses one day, he'll take two pills the next morning  - If he misses multiple days, he'll take two pills the morning he remembers then resume the schedule above  - Will check TSH 6-8 weeks from today with an office visit to review      Other orders  -     levothyroxine (SYNTHROID) 125 MCG tablet; Take 1 tablet (125 mcg total) by mouth every morning AND 1.5 tablets (187.5 mcg total) Every Friday. Take 1 tablet daily, take 1.5 tablets on Friday.  Dispense: 36 tablet; Refill: 11    Debbie Gu MD, FACS  Staff Surgeon  Endocrine Surgery  4/10/25

## 2025-04-10 NOTE — PATIENT INSTRUCTIONS
Continue to take thyroid hormone pill once a day in the morning before you go to work (around 5 am).    On FRIDAYS, take a full thyroid hormone pill PLUS a HALF a pill before you go to work (around 5 am).    After work when you have your breakfast, take your other medications (around 10 am).    If you forget to take your thyroid hormone pill, take it before lunch (around 11 am or 12 noon) to give it about an hour to absorb.    If you forget to take your thyroid hormone pill for a whole day, take 2 pills the next morning.

## 2025-04-11 LAB
CALCIT SERPL-MCNC: <5 PG/ML
ENDOCRINOLOGIST REVIEW: NORMAL
THYROGLOB AB SERPL IA-ACNC: <1.8 IU/ML
THYROGLOB SERPL-MCNC: 0.4 NG/ML

## 2025-04-21 ENCOUNTER — RESULTS FOLLOW-UP (OUTPATIENT)
Dept: ENDOCRINOLOGY | Facility: CLINIC | Age: 50
End: 2025-04-21
Payer: COMMERCIAL

## 2025-05-26 ENCOUNTER — OFFICE VISIT (OUTPATIENT)
Dept: URGENT CARE | Facility: CLINIC | Age: 50
End: 2025-05-26
Payer: COMMERCIAL

## 2025-05-26 VITALS
HEIGHT: 67 IN | HEART RATE: 60 BPM | DIASTOLIC BLOOD PRESSURE: 82 MMHG | SYSTOLIC BLOOD PRESSURE: 134 MMHG | WEIGHT: 198 LBS | TEMPERATURE: 98 F | OXYGEN SATURATION: 98 % | BODY MASS INDEX: 31.08 KG/M2 | RESPIRATION RATE: 16 BRPM

## 2025-05-26 DIAGNOSIS — T63.441A BEE STING, ACCIDENTAL OR UNINTENTIONAL, INITIAL ENCOUNTER: Primary | ICD-10-CM

## 2025-05-26 PROCEDURE — 96372 THER/PROPH/DIAG INJ SC/IM: CPT | Mod: S$GLB,,, | Performed by: NURSE PRACTITIONER

## 2025-05-26 PROCEDURE — 99214 OFFICE O/P EST MOD 30 MIN: CPT | Mod: 25,S$GLB,, | Performed by: NURSE PRACTITIONER

## 2025-05-26 RX ORDER — KETOROLAC TROMETHAMINE 30 MG/ML
30 INJECTION, SOLUTION INTRAMUSCULAR; INTRAVENOUS
Status: COMPLETED | OUTPATIENT
Start: 2025-05-26 | End: 2025-05-26

## 2025-05-26 RX ADMIN — KETOROLAC TROMETHAMINE 30 MG: 30 INJECTION, SOLUTION INTRAMUSCULAR; INTRAVENOUS at 06:05

## 2025-05-26 NOTE — PATIENT INSTRUCTIONS
PLEASE READ YOUR DISCHARGE INSTRUCTIONS ENTIRELY AS IT CONTAINS IMPORTANT INFORMATION.      Please drink plenty of fluids.  Please get plenty of rest.  Please return here or go to the Emergency Department for any concerns or worsening of condition (worsening rash, difficulty swallowing, shortness of breath, passing out).    DO NOT TAKE ANY MORE NAPROXEN OR IBUPROFEN FOR 24 HOURS AS YOU RECEIVED A LARGE DOSE OF IT VIA INJECTION     If you have a localized reaction it is ok to apply OTC  topical creams  as directed to the affected area.    Please take an over the counter antihistamine medication (allegra/Claritin/Zyrtec) of your choice as directed.  Benadryl at night - may make you drowsy do not drive after    Please follow up with your primary care doctor or specialist as needed.    If you  smoke, please stop smoking.    Please arrange follow up with your primary medical clinic as soon as possible. You must understand that you've received an Urgent Care treatment only and that you may be released before all of your medical problems are known or treated. You, the patient, will arrange for follow up as instructed. If your symptoms worsen or fail to improve you should go to the Emergency Room.

## 2025-05-26 NOTE — PROGRESS NOTES
"Subjective:      Patient ID: Talon Leblanc Jr. is a 50 y.o. male.    Vitals:  height is 5' 7" (1.702 m) and weight is 89.8 kg (198 lb). His oral temperature is 97.7 °F (36.5 °C). His blood pressure is 134/82 and his pulse is 60. His respiration is 16 and oxygen saturation is 98%.     Chief Complaint: Insect Bite    Patient reports bee sting to right side of head  1/2 hour ago.Patient reports he did not self medicate for bee sting.  Provider note below:  This is a 50 y.o. male who presents today with a chief complaint of bee sting that happened 1/2 hour ago, patient reports bee sting to the right side of her his forehead, patient reports pain 6/10,  denies fever, body aches or chills, denies cough, wheezing or shortness of breath, denies nausea, vomiting, diarrhea or abdominal pain, denies chest pain or dizziness positional lightheadedness, denies sore throat or trouble swallowing, denies loss of taste or smell, or any other symptoms       Insect Bite  This is a new problem. The current episode started today. The problem occurs constantly. The problem has been gradually worsening. Pertinent negatives include no fever or headaches. Nothing aggravates the symptoms. He has tried nothing for the symptoms.       Constitution: Negative for fever.   Skin:  Negative for erythema.   Neurological:  Negative for headaches.      Objective:     Physical Exam   Constitutional: He is oriented to person, place, and time. He appears well-developed.   HENT:   Head: Normocephalic and atraumatic. Head is without abrasion, without contusion and without laceration.       Ears:   Right Ear: External ear normal.   Left Ear: External ear normal.   Nose: Nose normal.   Mouth/Throat: Oropharynx is clear and moist and mucous membranes are normal.   Approximately 1 cm area of mild erythema/swelling to right-sided forehead with sensitive to touch, no tenderness noted, no surrounding erythema noted, no drainage or discharge noted      " Comments: Approximately 1 cm area of mild erythema/swelling to right-sided forehead with sensitive to touch, no tenderness noted, no surrounding erythema noted, no drainage or discharge noted  Eyes: Conjunctivae, EOM and lids are normal. Pupils are equal, round, and reactive to light.   Neck: Trachea normal and phonation normal. Neck supple.   Cardiovascular: Normal rate, regular rhythm and normal heart sounds.   Pulmonary/Chest: Effort normal and breath sounds normal. No stridor. No respiratory distress.   Musculoskeletal: Normal range of motion.         General: Normal range of motion.   Neurological: He is alert and oriented to person, place, and time.   Skin: Skin is warm, dry, intact and no rash. Capillary refill takes less than 2 seconds. No abrasion, No burn, No bruising, No erythema and No ecchymosis   Psychiatric: His speech is normal and behavior is normal. Judgment and thought content normal.   Nursing note and vitals reviewed.        Patient in no acute distress.  Vitals reassuring.  Discussed results/diagnosis/plan in depth with patient in clinic. Strict precautions given to patient to monitor for worsening signs and symptoms. Advised to follow up with primary.All questions answered. Strict ER precautions given. If your symptoms worsens or fail to improve you should go to the Emergency Room. Discharge and follow-up instructions given verbally/printed. Discharge and follow-up instructions discussed with the patient who expressed understanding and willingness to comply with my recommendations.Patient voiced understanding and in agreement with current treatment plan.     Please be advised this text was dictated with [employer] software and may contain errors due to translation.   Assessment:     1. Bee sting, accidental or unintentional, initial encounter        Plan:       Bee sting, accidental or unintentional, initial encounter    Other orders  -     ketorolac injection 30 mg          Medical Decision  Making:   Urgent Care Management:  Patient in no acute distress.  Vitals reassuring.  On exam, patient is nontoxic appearing and afebrile.  Lungs CTA.  Physical examination with bee sting to right-sided forehead with mild swelling and erythema as it happened 30 minutes ago.  No surrounding erythema noted.  Patient reports pain 6/10.  cold compresses recommended.  Toradol injection discussed for pain and inflammation.  With detailed education provided about side effects and recommendation.  Patient agreed treatment with Toradol.  Red flags discussed with patient in detail.  Re-evaluation and follow up recommended.  Medication prescribed and over-the-counter medication discussed with patient at length.  Proper hydration advised.  I reiterated the importance of further evaluation if no improvement symptoms and follow-up with primary.          Patient Instructions   PLEASE READ YOUR DISCHARGE INSTRUCTIONS ENTIRELY AS IT CONTAINS IMPORTANT INFORMATION.      Please drink plenty of fluids.  Please get plenty of rest.  Please return here or go to the Emergency Department for any concerns or worsening of condition (worsening rash, difficulty swallowing, shortness of breath, passing out).    DO NOT TAKE ANY MORE NAPROXEN OR IBUPROFEN FOR 24 HOURS AS YOU RECEIVED A LARGE DOSE OF IT VIA INJECTION     If you have a localized reaction it is ok to apply OTC  topical creams  as directed to the affected area.    Please take an over the counter antihistamine medication (allegra/Claritin/Zyrtec) of your choice as directed.  Benadryl at night - may make you drowsy do not drive after    Please follow up with your primary care doctor or specialist as needed.    If you  smoke, please stop smoking.    Please arrange follow up with your primary medical clinic as soon as possible. You must understand that you've received an Urgent Care treatment only and that you may be released before all of your medical problems are known or treated. You,  the patient, will arrange for follow up as instructed. If your symptoms worsen or fail to improve you should go to the Emergency Room.

## 2025-05-27 ENCOUNTER — LAB VISIT (OUTPATIENT)
Dept: LAB | Facility: HOSPITAL | Age: 50
End: 2025-05-27
Attending: STUDENT IN AN ORGANIZED HEALTH CARE EDUCATION/TRAINING PROGRAM
Payer: COMMERCIAL

## 2025-05-27 DIAGNOSIS — C73 THYROID CANCER: ICD-10-CM

## 2025-05-27 DIAGNOSIS — Z98.890 S/P TOTAL THYROIDECTOMY: ICD-10-CM

## 2025-05-27 DIAGNOSIS — Z90.89 S/P TOTAL THYROIDECTOMY: ICD-10-CM

## 2025-05-27 LAB
T4 FREE SERPL-MCNC: 0.55 NG/DL (ref 0.71–1.51)
TSH SERPL-ACNC: 36.47 UIU/ML (ref 0.4–4)

## 2025-05-27 PROCEDURE — 36415 COLL VENOUS BLD VENIPUNCTURE: CPT

## 2025-05-27 PROCEDURE — 84439 ASSAY OF FREE THYROXINE: CPT

## 2025-05-27 PROCEDURE — 84443 ASSAY THYROID STIM HORMONE: CPT

## 2025-05-28 ENCOUNTER — RESULTS FOLLOW-UP (OUTPATIENT)
Dept: SURGERY | Facility: CLINIC | Age: 50
End: 2025-05-28

## 2025-05-28 NOTE — PROGRESS NOTES
Endocrine Surgery Clinic Note    Reason for visit / Chief complaint: Postoperative evaluation  Endocrinologist: Siomara Azar NP  Procedure:  THYROIDECTOMY Total, Excision of substernal thyroid goiter  Procedure Date: 10/11/2024    Subjective:     Interval History 5/29/25: Mr Leblanc returns to clinic for follow up and labs. He was last seen in April at which time his levothyroxine dosage was adjusted. Previous TSH was 4.688. Today it is 36.46.  He has occasionally missed doses and occasionally has the levothyroxine with coffee and drinks that with cream and sugar.  We reviewed how to take levothyroxine and explored options to try and take it every day.  If he remembers, he takes it in the morning before work, and if he forgets, he usually remembers when he is at work.  He will try taking 137 mcg in the morning before work. I asked him to take the 125 mcg tablets to work and leave them there, so if he forgets to take the Rx in the morning he will have some at work to take them there.  If he misses an entire day, he'll take two tablets the next day.  Written instructions have been provided.     Interval History 4/10/25: Mr. 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     Since his last visit he has been doing well.  He saw endocrinology and adjustments were made to his levothyroxine schedule, though he hasn't started taking the extra half tab weekly. We reviewed his usual daily schedule and figured out a schedule to take the levothyroxine and his other medications.     HPI from initial post-op visit (11/2024) Talon Zieglerpam Pereajass Pedersen returns today for postoperative evaluation and to review the pathology.  He has been doing well otherwise and has been compliant with the levothyroxine as prescribed.   "    Procedure:   Total thyroidectomy, excision of substernal multinodular thyroid goiter via cervical incision  Intraoperative monitoring and interpretation of bilateral cranial nerves (vagus and recurrent laryngeal nerves) using the Neurovision Cobra EMG endotracheal tube and Medtronic NIM-Response 3.0 system    Operative findings:   Substernal left inferior thyroid nodule, no suspicious features.   Firm right mid thyroid nodule with possible extrathyroidal extension with adherent overlying sternothyroid muscle.  The bilateral recurrent laryngeal and vagus nerves were identified and preserved.  Function was verified using the nerve monitoring system.      Current Outpatient Medications   Medication Sig Dispense Refill    latanoprost 0.005 % ophthalmic solution Inject 2 drops into the eye every evening.      losartan (COZAAR) 25 MG tablet Take 1 tablet (25 mg total) by mouth once daily. 90 tablet 3    rosuvastatin (CRESTOR) 20 MG tablet Take 1 tablet (20 mg total) by mouth once daily. 90 tablet 3    levothyroxine (SYNTHROID) 137 MCG Tab tablet Take 1 tablet (137 mcg total) by mouth every morning. Take 1 tablet daily, take 1.5 tablets on Friday. 30 tablet 11     No current facility-administered medications for this visit.     Facility-Administered Medications Ordered in Other Visits   Medication Dose Route Frequency Provider Last Rate Last Admin    0.9%  NaCl infusion   Intravenous Continuous Serafin Avina MD 10 mL/hr at 08/19/22 0830 New Bag at 08/19/22 0830    LIDOcaine (PF) 10 mg/ml (1%) injection 10 mg  1 mL Intradermal Once Serafin Avina MD        mupirocin 2 % ointment   Nasal On Call Procedure Sharon Tirado MD   Given at 08/19/22 0830     Review of patient's allergies indicates:  No Known Allergies     Review of Systems  Negative except as per HPI.     Objective:   /68   Pulse 64   Ht 5' 7" (1.702 m)   Wt 94.2 kg (207 lb 10.8 oz)   SpO2 98%   BMI 32.53 kg/m²     General: alert, well " appearing, and in no distress  Neck: neck is flat, no erythema or edema  Incision: well approximated, healing well  Neurological: voice is strong    Labs:  Lab Results   Component Value Date    TSH 36.468 (H) 05/27/2025    TSH 4.688 (H) 04/10/2025    TSH 1.695 02/14/2025    FREET4 0.55 (L) 05/27/2025    FREET4 0.87 04/10/2025    FREET4 1.24 11/22/2024    [id number] 14 (L) 04/10/2025       Pathology  Final Pathologic Diagnosis   Date Value Ref Range Status   10/11/2024   Corrected    Thyroid, total thyroidectomy:   Pending consultation with [facility]    Final diagnosis with additional information will be provided in a supplemental report.       Comment:     Interp By Suyapa Hanna M.D., Signed on 11/15/2024 at 13:11         Assessment and Plan:     1. Post-surgical hypothyroidism  Assessment & Plan:  Reviewed labs.  Struggling with daily medication.  TSH elevated with low T4.    Will increase to 137 mcg daily  Patient to keep extra 125 mcg tablets at work  If he forgets to take the 137 mcg AM dose, he'll take the 125 mcg at work  If he forgets for an entire day, he'll double up the next day  TSH at 6 weeks with an office visit to review      2. S/P total thyroidectomy  Overview:  Multiple thyroid nodules bilaterally with 4.1 cm dominant left inferior nodule that had prior FLUS/AUS cytology and positive molecular markers s/p total thyroidectomy with excision of substernal thyroid goiter on 10/11/224.  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  No lymphovascular invasion.     Orders:  -     TSH; Future; Expected date: 05/29/2025    3. Thyroid cancer  -     TSH; Future; Expected date: 05/29/2025    4. Medullary thyroid carcinoma  Overview:  Multiple thyroid nodules bilaterally with 4.1 cm dominant left inferior nodule that had prior FLUS/AUS cytology and  positive molecular markers s/p total thyroidectomy with excision of substernal thyroid goiter on 10/11/224.  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  No lymphovascular invasion.     Assessment & Plan:  Ongoing surveillance with endocrinology.  RET testing negative.  History as above.  Post-op CEA normal, calcitonin undetectable  Repeat calcitonin every 6 months for 1 year  6 month post op ultrasound clean.      5. Papillary carcinoma, follicular variant  Overview:  Multiple thyroid nodules bilaterally with 4.1 cm dominant left inferior nodule that had prior FLUS/AUS cytology and positive molecular markers s/p total thyroidectomy with excision of substernal thyroid goiter on 10/11/224.  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  No lymphovascular invasion.     Assessment & Plan:  Continue surveillance with endocrinology  Low MAN risk.  Post op Tg 0.5.      6. Vitamin D insufficiency  Assessment & Plan:  Vitamin D insufficient.     - Recommend taking 2000 IU of over the counter vitamin D3 supplementation daily  - Will confirm this after we get his TSH and levothyroxine under good control        Other orders  -     levothyroxine (SYNTHROID) 137 MCG Tab tablet; Take 1 tablet (137 mcg total) by mouth every morning. Take 1 tablet daily, take 1.5 tablets on Friday.  Dispense: 30 tablet; Refill: 11      Debbie Gu MD, FACS  Staff Surgeon  Endocrine Surgery  5/29/25

## 2025-05-29 ENCOUNTER — PATIENT MESSAGE (OUTPATIENT)
Dept: SURGERY | Facility: CLINIC | Age: 50
End: 2025-05-29

## 2025-05-29 ENCOUNTER — OFFICE VISIT (OUTPATIENT)
Dept: SURGERY | Facility: CLINIC | Age: 50
End: 2025-05-29
Payer: COMMERCIAL

## 2025-05-29 VITALS
HEART RATE: 64 BPM | SYSTOLIC BLOOD PRESSURE: 133 MMHG | OXYGEN SATURATION: 98 % | DIASTOLIC BLOOD PRESSURE: 68 MMHG | HEIGHT: 67 IN | BODY MASS INDEX: 32.6 KG/M2 | WEIGHT: 207.69 LBS

## 2025-05-29 DIAGNOSIS — E89.0 POST-SURGICAL HYPOTHYROIDISM: Primary | ICD-10-CM

## 2025-05-29 DIAGNOSIS — Z90.89 S/P TOTAL THYROIDECTOMY: ICD-10-CM

## 2025-05-29 DIAGNOSIS — E55.9 VITAMIN D INSUFFICIENCY: ICD-10-CM

## 2025-05-29 DIAGNOSIS — C73 THYROID CANCER: ICD-10-CM

## 2025-05-29 DIAGNOSIS — C73 PAPILLARY CARCINOMA, FOLLICULAR VARIANT: ICD-10-CM

## 2025-05-29 DIAGNOSIS — Z98.890 S/P TOTAL THYROIDECTOMY: ICD-10-CM

## 2025-05-29 DIAGNOSIS — C73 MEDULLARY THYROID CARCINOMA: ICD-10-CM

## 2025-05-29 PROCEDURE — 99999 PR PBB SHADOW E&M-EST. PATIENT-LVL III: CPT | Mod: PBBFAC,,, | Performed by: STUDENT IN AN ORGANIZED HEALTH CARE EDUCATION/TRAINING PROGRAM

## 2025-05-29 RX ORDER — LEVOTHYROXINE SODIUM 137 UG/1
137 TABLET ORAL EVERY MORNING
Qty: 30 TABLET | Refills: 11 | Status: SHIPPED | OUTPATIENT
Start: 2025-05-29 | End: 2026-05-29

## 2025-05-29 NOTE — PATIENT INSTRUCTIONS
Levothyroxine (thyroid hormone) pill    Take 137 mcg (new Rx) once a day in the morning before breakfast  Bring the old 125 mcg tablets to work and leave them there.  IF you forget to take your thyroid hormone in the morning, take the 125 mcg at work.  Do not take both 137 mcg and 125 mcg tablets on the same day.  If you miss one or two days, you can double up the thyroid hormone the next day and take two tablets.

## 2025-05-29 NOTE — ASSESSMENT & PLAN NOTE
Ongoing surveillance with endocrinology.  RET testing negative.  History as above.  Post-op CEA normal, calcitonin undetectable  Repeat calcitonin every 6 months for 1 year  6 month post op ultrasound clean.

## 2025-05-29 NOTE — ASSESSMENT & PLAN NOTE
Reviewed labs.  Struggling with daily medication.  TSH elevated with low T4.    Will increase to 137 mcg daily  Patient to keep extra 125 mcg tablets at work  If he forgets to take the 137 mcg AM dose, he'll take the 125 mcg at work  If he forgets for an entire day, he'll double up the next day  TSH at 6 weeks with an office visit to review

## 2025-05-29 NOTE — ASSESSMENT & PLAN NOTE
Vitamin D insufficient.     - Recommend taking 2000 IU of over the counter vitamin D3 supplementation daily  - Will confirm this after we get his TSH and levothyroxine under good control

## 2025-06-10 NOTE — ASSESSMENT & PLAN NOTE
A1c is 6.2%   He would like to work on lifestyle changes prior to starting medications    Given information on diet   4 = No assist / stand by assistance

## 2025-07-03 ENCOUNTER — LAB VISIT (OUTPATIENT)
Dept: LAB | Facility: HOSPITAL | Age: 50
End: 2025-07-03
Attending: STUDENT IN AN ORGANIZED HEALTH CARE EDUCATION/TRAINING PROGRAM
Payer: COMMERCIAL

## 2025-07-03 DIAGNOSIS — Z90.89 S/P TOTAL THYROIDECTOMY: ICD-10-CM

## 2025-07-03 DIAGNOSIS — C73 THYROID CANCER: ICD-10-CM

## 2025-07-03 DIAGNOSIS — Z98.890 S/P TOTAL THYROIDECTOMY: ICD-10-CM

## 2025-07-03 LAB — TSH SERPL-ACNC: 6.89 UIU/ML (ref 0.4–4)

## 2025-07-03 PROCEDURE — 84439 ASSAY OF FREE THYROXINE: CPT

## 2025-07-03 PROCEDURE — 84443 ASSAY THYROID STIM HORMONE: CPT

## 2025-07-03 PROCEDURE — 36415 COLL VENOUS BLD VENIPUNCTURE: CPT

## 2025-07-04 LAB — T4 FREE SERPL-MCNC: 0.94 NG/DL (ref 0.71–1.51)

## 2025-07-14 ENCOUNTER — PATIENT MESSAGE (OUTPATIENT)
Dept: SURGERY | Facility: CLINIC | Age: 50
End: 2025-07-14
Payer: COMMERCIAL

## 2025-07-14 RX ORDER — LEVOTHYROXINE SODIUM 150 UG/1
150 TABLET ORAL EVERY MORNING
Qty: 30 TABLET | Refills: 11 | Status: SHIPPED | OUTPATIENT
Start: 2025-07-14 | End: 2026-07-14

## 2025-07-14 NOTE — TELEPHONE ENCOUNTER
Visit scheduled for today not kept.  Assessment and Plan:     1. Papillary carcinoma, follicular variant  2. Medullary thyroid carcinoma  Multiple thyroid nodules bilaterally with 4.1 cm dominant left inferior nodule that had prior FLUS/AUS cytology and positive molecular markers s/p total thyroidectomy with excision of substernal thyroid goiter on 10/11/224.  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  No lymphovascular invasion.      Low MAN risk.  Post op Tg 0.5  RET testing negative.  Post-op CEA normal, calcitonin undetectable.    - TSH remains elevated: change to 150 mcg levothyroxine daily, plus an extra 1/2 tablet on Fridays (New Rx sent today; will have staff reach out to update patient with this change).  - TSH in 6 weeks (Needs to be scheduled)  - Repeat calcitonin every 6 months for 1 year (Due 10/2025)  - 6 month post op ultrasound clean. (Due 10/2025)  - Needs to establish follow up with endocrinology for ongoing surveillance      3. Vitamin D insufficiency     - Recommend taking 2000 IU of over the counter vitamin D3 supplementation daily      Debbie Gu MD  07/14/2025  2:48 PM

## 2025-07-16 ENCOUNTER — HOSPITAL ENCOUNTER (OUTPATIENT)
Dept: RADIOLOGY | Facility: HOSPITAL | Age: 50
Discharge: HOME | End: 2025-07-16
Attending: INTERNAL MEDICINE
Payer: COMMERCIAL

## 2025-07-16 ENCOUNTER — OFFICE VISIT (OUTPATIENT)
Dept: INTERNAL MEDICINE | Facility: CLINIC | Age: 50
End: 2025-07-16
Payer: COMMERCIAL

## 2025-07-16 VITALS
OXYGEN SATURATION: 99 % | HEIGHT: 67 IN | DIASTOLIC BLOOD PRESSURE: 80 MMHG | WEIGHT: 202.81 LBS | BODY MASS INDEX: 31.83 KG/M2 | HEART RATE: 60 BPM | SYSTOLIC BLOOD PRESSURE: 134 MMHG

## 2025-07-16 DIAGNOSIS — C73 PAPILLARY CARCINOMA, FOLLICULAR VARIANT: ICD-10-CM

## 2025-07-16 DIAGNOSIS — I10 ESSENTIAL HYPERTENSION: ICD-10-CM

## 2025-07-16 DIAGNOSIS — Z23 NEED FOR PNEUMOCOCCAL VACCINE: ICD-10-CM

## 2025-07-16 DIAGNOSIS — Z90.89 S/P TOTAL THYROIDECTOMY: ICD-10-CM

## 2025-07-16 DIAGNOSIS — E89.0 POST-SURGICAL HYPOTHYROIDISM: ICD-10-CM

## 2025-07-16 DIAGNOSIS — Z98.890 S/P TOTAL THYROIDECTOMY: ICD-10-CM

## 2025-07-16 DIAGNOSIS — G89.29 CHRONIC LEFT-SIDED LOW BACK PAIN WITHOUT SCIATICA: ICD-10-CM

## 2025-07-16 DIAGNOSIS — R73.03 PREDIABETES: ICD-10-CM

## 2025-07-16 DIAGNOSIS — M54.50 CHRONIC LEFT-SIDED LOW BACK PAIN WITHOUT SCIATICA: ICD-10-CM

## 2025-07-16 DIAGNOSIS — E78.2 MIXED HYPERLIPIDEMIA: ICD-10-CM

## 2025-07-16 DIAGNOSIS — Z09 FOLLOW-UP EXAM: Primary | ICD-10-CM

## 2025-07-16 PROCEDURE — 72110 X-RAY EXAM L-2 SPINE 4/>VWS: CPT | Mod: TC

## 2025-07-16 PROCEDURE — 3075F SYST BP GE 130 - 139MM HG: CPT | Mod: CPTII,S$GLB,, | Performed by: INTERNAL MEDICINE

## 2025-07-16 PROCEDURE — 90471 IMMUNIZATION ADMIN: CPT | Mod: S$GLB,,, | Performed by: INTERNAL MEDICINE

## 2025-07-16 PROCEDURE — 99999 PR PBB SHADOW E&M-EST. PATIENT-LVL V: CPT | Mod: PBBFAC,,, | Performed by: INTERNAL MEDICINE

## 2025-07-16 PROCEDURE — 90677 PCV20 VACCINE IM: CPT | Mod: S$GLB,,, | Performed by: INTERNAL MEDICINE

## 2025-07-16 PROCEDURE — 3008F BODY MASS INDEX DOCD: CPT | Mod: CPTII,S$GLB,, | Performed by: INTERNAL MEDICINE

## 2025-07-16 PROCEDURE — 1159F MED LIST DOCD IN RCRD: CPT | Mod: CPTII,S$GLB,, | Performed by: INTERNAL MEDICINE

## 2025-07-16 PROCEDURE — 99214 OFFICE O/P EST MOD 30 MIN: CPT | Mod: 25,S$GLB,, | Performed by: INTERNAL MEDICINE

## 2025-07-16 PROCEDURE — 1160F RVW MEDS BY RX/DR IN RCRD: CPT | Mod: CPTII,S$GLB,, | Performed by: INTERNAL MEDICINE

## 2025-07-16 PROCEDURE — 4010F ACE/ARB THERAPY RXD/TAKEN: CPT | Mod: CPTII,S$GLB,, | Performed by: INTERNAL MEDICINE

## 2025-07-16 PROCEDURE — 72110 X-RAY EXAM L-2 SPINE 4/>VWS: CPT | Mod: 26,,, | Performed by: RADIOLOGY

## 2025-07-16 PROCEDURE — 3044F HG A1C LEVEL LT 7.0%: CPT | Mod: CPTII,S$GLB,, | Performed by: INTERNAL MEDICINE

## 2025-07-16 PROCEDURE — 3079F DIAST BP 80-89 MM HG: CPT | Mod: CPTII,S$GLB,, | Performed by: INTERNAL MEDICINE

## 2025-07-16 NOTE — PATIENT INSTRUCTIONS
"Pneumonia shot today.   X-ray lumbar spine today.  Schedule fasting labwork.    Shingles vaccination at your convenience - "Shingrix" two shot series, 2-6 months apart. Can get here at [facility] or at any local pharmacy.     Referral to physical therapy has been placed for your lower back, they should be calling you to schedule an appointment.     Return to clinic in 6 months or sooner if needed.   "

## 2025-07-16 NOTE — PROGRESS NOTES
Subjective:      History of Present Illness    CHIEF COMPLAINT:  Talon presents for a follow-up visit with complaints of back pain and to discuss recent thyroid medication changes.    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 The pain is exacerbated when he lays down, sits in certain positions, and feels ground vibrations. He also notes a new symptom of brief, momentary tingling sensation that travels from his lower back to his leg.    Talon has been using a back brace, which helps with the pain. He attempts to lift with his knees instead of his back, though he occasionally uses his back when moving quickly at work. Talon takes ibuprofen for pain relief approximately once a month.    Regarding his thyroid condition, his endocrinologist recently increased his thyroid medication from 137 to 150 mcg a few days ago. He is scheduled to see them tomorrow for follow-up.    Talon had an X-ray of his back in August, which showed mild degenerative changes. The pain has remained steady since then, but the new tingling sensation in his leg is a recent development.    Talon denies dragging his feet, stumbling, chest pain, shortness of breath, abdominal pain, nausea, vomiting, blood in urine, and blood in stool.    MEDICAL HISTORY:  Talon has a history of hypothyroidism and pre-diabetes. Colon polyps were found 2 years ago during a colonoscopy. Talon has received the COVID-19 vaccination.    SURGICAL HISTORY:  Talon underwent a screening colonoscopy 2 years ago, during which polyps were found.    SOCIAL HISTORY:  Occupation: Employed, job involves picking up packages      ROS:  Cardiovascular: -chest pain  Respiratory: -shortness of breath  Gastrointestinal: -abdominal pain, -nausea, -vomiting, -blood in stool  Genitourinary: -hematuria  Musculoskeletal: +pain with movement, +back pain, +limb  "pain  Neurological: +tingling           Past medical history, surgical history, and family medical history reviewed and updated as appropriate.    Medications and allergies reviewed.     Objective:          Vitals:    07/16/25 1358   BP: 134/80   BP Location: Right arm   Patient Position: Sitting   Pulse: 60   SpO2: 99%   Weight: 92 kg (202 lb 13.2 oz)   Height: 5' 7" (1.702 m)     Body mass index is 31.77 kg/m².  Physical Exam  Constitutional:       General: He is not in acute distress.     Appearance: He is well-developed.   HENT:      Head: Normocephalic and atraumatic.      Nose: Nose normal.   Eyes:      General: No scleral icterus.     Extraocular Movements: Extraocular movements intact.      Pupils: Pupils are equal, round, and reactive to light.   Neck:      Thyroid: No thyromegaly.      Vascular: No JVD.      Trachea: No tracheal deviation.   Cardiovascular:      Rate and Rhythm: Normal rate and regular rhythm.      Heart sounds: Normal heart sounds. No murmur heard.     No friction rub. No gallop.   Pulmonary:      Effort: Pulmonary effort is normal. No respiratory distress.      Breath sounds: Normal breath sounds. No wheezing or rales.   Abdominal:      General: There is no distension.      Palpations: Abdomen is soft. There is no mass.      Tenderness: There is no abdominal tenderness.   Musculoskeletal:         General: No tenderness. Normal range of motion.      Cervical back: Normal range of motion and neck supple.   Lymphadenopathy:      Cervical: No cervical adenopathy.   Skin:     General: Skin is warm and dry.      Findings: No rash.   Neurological:      Mental Status: He is alert and oriented to person, place, and time.      Cranial Nerves: No cranial nerve deficit.      Deep Tendon Reflexes: Reflexes normal.   Psychiatric:         Behavior: Behavior normal.         Lab Results   Component Value Date    WBC 8.70 08/02/2024    HGB 13.6 (L) 08/02/2024    HCT 41.8 08/02/2024     08/02/2024 "    CHOL 214 (H) 04/22/2024    TRIG 161 (H) 04/22/2024    HDL 38 (L) 04/22/2024    ALT 24 08/02/2024    ALT 24 08/02/2024    AST 19 08/02/2024    AST 19 08/02/2024     01/15/2025    K 3.9 01/15/2025     01/15/2025    CREATININE 0.9 01/15/2025    BUN 11 01/15/2025    CO2 26 01/15/2025    TSH 6.888 (H) 07/03/2025    HGBA1C 5.8 (H) 01/15/2025       Assessment:       1. Need for pneumococcal vaccine    2. Follow-up exam    3. Essential hypertension    4. Prediabetes    5. Chronic left-sided low back pain without sciatica    6. Mixed hyperlipidemia    7. Post-surgical hypothyroidism    8. history of Papillary carcinoma, follicular variant    9. S/P total thyroidectomy          Plan:     Talon was seen today for follow-up and low-back pain.    Diagnoses and all orders for this visit:    Follow-up exam    Essential hypertension  Comments:  on losartan 25, blood pressure controlled, no changes to current management  Orders:  -     CBC Auto Differential; Future  -     Comprehensive Metabolic Panel; Future    Prediabetes  Comments:  a1c 5.8, recheck a1c; not on medications, no changes to current management  Orders:  -     Hemoglobin A1C; Future    Chronic left-sided low back pain without sciatica  Comments:  chronic, worsens with activity, has had spasms radiating down L buttocks. controlled with back brace, would like to see PT. obtain X-ray to compare last year  Orders:  -     X-Ray Lumbar Spine 5 View; Future  -     Ambulatory Referral/Consult to Physical Therapy    Mixed hyperlipidemia  Comments:  on rosuvastatin 20, LDL goal <100  Orders:  -     Lipid Panel; Future    Post-surgical hypothyroidism  Comments:  recently increased to levothyroxine 150 mcg, follows with endocrinology. has f/u appointment tomorrow.    history of Papillary carcinoma, follicular variant    S/P total thyroidectomy    Need for pneumococcal vaccine  -     pneumoc 20-diogo conj-dip cr(PF) (PREVNAR-20 (PF)) injection Syrg 0.5 mL    Benign  physical examination, no issues identified. Will obtain routine labwork and age appropriate health screenings.     Health maintenance reviewed with patient.     Follow up in about 6 months (around 1/16/2026).    Giancarlo Covarrubias MD  Internal Medicine / Primary Care  [facility]  7/16/2025    This note was generated with the assistance of ambient listening technology. Verbal consent was obtained by the patient and accompanying visitor(s) for the recording of patient appointment to facilitate this note. I attest to having reviewed and edited the generated note for accuracy, though some syntax or spelling errors may persist. Please contact the author of this note for any clarification.

## 2025-07-16 NOTE — PROGRESS NOTES
Endocrine Surgery Clinic Note    Reason for visit / Chief complaint: Postoperative evaluation  Endocrinologist: Siomara Azar NP  Procedure:  THYROIDECTOMY Total, Excision of substernal thyroid goiter  Procedure Date: 10/11/2024    Subjective:     Interval history 7/16/25:  Returns to clinic for follow up and labs. TSH 6.888 and was 36.468 at the end of May. T4 normal at 0.94, previously low at 0.55.  He was taking a single 137 mcg tablet daily except Fridays he takes 1.5 tablets. TSH is improved, but still elevated. Dose adjusted per endocrinology recommendations to 150 mcg daily with an additional half tablet once weekly.  We reviewed the plan today, he will  his Rx tomorrow.  He reports missing 1-2 days per week, he tries to remember to double up the following day if he misses a dose.  He tries waiting 60 minutes before eating breakfast in the morning.  He is keeping extra levothyroxine at work in case he forgets to take his medication before work in the morning.    Interval History 5/29/25: Mr Leblanc returns to clinic for follow up and labs. He was last seen in April at which time his levothyroxine dosage was adjusted. Previous TSH was 4.688. Today it is 36.46.  He has occasionally missed doses and occasionally has the levothyroxine with coffee and drinks that with cream and sugar.  We reviewed how to take levothyroxine and explored options to try and take it every day.  If he remembers, he takes it in the morning before work, and if he forgets, he usually remembers when he is at work.  He will try taking 137 mcg in the morning before work. I asked him to take the 125 mcg tablets to work and leave them there, so if he forgets to take the Rx in the morning he will have some at work to take them there.  If he misses an entire day, he'll take two tablets the next day.  Written instructions have been provided.     Interval History 4/10/25: Mr. 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     Since his last visit he has been doing well.  He saw endocrinology and adjustments were made to his levothyroxine schedule, though he hasn't started taking the extra half tab weekly. We reviewed his usual daily schedule and figured out a schedule to take the levothyroxine and his other medications.     HPI from initial post-op visit (11/2024) Talon Leblanc Jr. returns today for postoperative evaluation and to review the pathology.  He has been doing well otherwise and has been compliant with the levothyroxine as prescribed.      Procedure:   Total thyroidectomy, excision of substernal multinodular thyroid goiter via cervical incision  Intraoperative monitoring and interpretation of bilateral cranial nerves (vagus and recurrent laryngeal nerves) using the Neurovis[employer] Cobra EMG endotracheal tube and Medtronic NIM-Response 3.0 system    Operative findings:   Substernal left inferior thyroid nodule, no suspicious features.   Firm right mid thyroid nodule with possible extrathyroidal extension with adherent overlying sternothyroid muscle.  The bilateral recurrent laryngeal and vagus nerves were identified and preserved.  Function was verified using the nerve monitoring system.      Current Outpatient Medications   Medication Sig Dispense Refill    latanoprost 0.005 % ophthalmic solution Inject 2 drops into the eye every evening.      levothyroxine (SYNTHROID) 150 MCG tablet Take 1 tablet (150 mcg total) by mouth every morning. Take 1 tablet daily, take 1.5 tablets on Friday. 30 tablet 11    losartan (COZAAR) 25 MG tablet Take 1 tablet (25 mg total) by mouth once daily. 90 tablet 3    rosuvastatin (CRESTOR) 20 MG tablet Take 1 tablet (20 mg total) by mouth once daily. 90 tablet 3     No current facility-administered  "medications for this visit.     Facility-Administered Medications Ordered in Other Visits   Medication Dose Route Frequency Provider Last Rate Last Admin    0.9%  NaCl infusion   Intravenous Continuous Serafin Avina MD 10 mL/hr at 08/19/22 0830 New Bag at 08/19/22 0830    LIDOcaine (PF) 10 mg/ml (1%) injection 10 mg  1 mL Intradermal Once Serafin Avina MD        mupirocin 2 % ointment   Nasal On Call Procedure Sharon Tirado MD   Given at 08/19/22 0830     Review of patient's allergies indicates:  No Known Allergies     Review of Systems  Negative except as per HPI.     Objective:   /71 (BP Location: Right arm, Patient Position: Sitting)   Pulse 66   Ht 5' 7" (1.702 m)   Wt 91.6 kg (201 lb 13.3 oz)   SpO2 99%   BMI 31.61 kg/m²     General: alert, well appearing, and in no distress  Neck: neck is flat, no erythema or edema  Incision: well approximated, healing well  Neurological: voice is strong    Labs:  Lab Results   Component Value Date    TSH 6.888 (H) 07/03/2025    TSH 36.468 (H) 05/27/2025    FREET4 0.94 07/03/2025    FREET4 0.55 (L) 05/27/2025    CALCITONIN <5.0 02/20/2025    CEA 2.7 11/22/2024    [id number] 14 (L) 04/10/2025    [id number] 21 (L) 11/22/2024       Pathology  Final Pathologic Diagnosis   Date Value Ref Range Status   10/11/2024   Corrected    Thyroid, total thyroidectomy:   Pending consultation with [facility]    Final diagnosis with additional information will be provided in a supplemental report.       Comment:     Interp By Suyapa Hanna M.D., Signed on 11/15/2024 at 13:11         Assessment and Plan:     1. S/P total thyroidectomy  Overview:  Multiple thyroid nodules bilaterally with 4.1 cm dominant left inferior nodule that had prior FLUS/AUS cytology and positive molecular markers s/p total thyroidectomy with excision of substernal thyroid goiter on 10/11/224.  Final pathology demonstrated a left 2.1 cm encapsulated follicular variant of " papillary thyroid carcinoma, a left < 0.5 mm medullary thyroid microcarcinoma and two follicular adenomas.  No lymphovascular invasion.     Orders:  -     TSH; Future; Expected date: 07/17/2025    2. Thyroid cancer  -     TSH; Future; Expected date: 07/17/2025    3. Medullary thyroid carcinoma  Overview:  Multiple thyroid nodules bilaterally with 4.1 cm dominant left inferior nodule that had prior FLUS/AUS cytology and positive molecular markers s/p total thyroidectomy with excision of substernal thyroid goiter on 10/11/224.  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  No lymphovascular invasion.     Assessment & Plan:  RET testing negative.  Post-op CEA normal, calcitonin undetectable.  Repeat calcitonin every 6 months for 1 year (Due 10/2025)  6 month post op ultrasound clean. (Due 10/2025)      4. Papillary carcinoma, follicular variant  Overview:  Multiple thyroid nodules bilaterally with 4.1 cm dominant left inferior nodule that had prior FLUS/AUS cytology and positive molecular markers s/p total thyroidectomy with excision of substernal thyroid goiter on 10/11/224.  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  No lymphovascular invasion.     Assessment & Plan:  Low MAN risk  Post op Tg 0.5  6 month post op ultrasound clean  Repeat ultrasound at 6 months (Due 10/2025)        5. Vitamin D insufficiency  Assessment & Plan:  Vitamin D insufficient.     - Recommend taking 2000 IU of over the counter vitamin D3 supplementation daily      6. Post-surgical hypothyroidism  Assessment & Plan:  Reviewed labs.  TSH remains elevated on 137 mcg tab daily plus extra half tab Fridays.     Will increase to 150 mcg daily and take an extra half tab on  Fridays  New Rx sent, he'll pick it up tomorrow  If he forgets for an entire day, he'll double up the next day  TSH at 6 weeks with an office visit the following week to review      Debbie Gu MD, FACS  Staff Surgeon  Endocrine Surgery  7/17/25

## 2025-07-17 ENCOUNTER — PATIENT MESSAGE (OUTPATIENT)
Dept: SURGERY | Facility: CLINIC | Age: 50
End: 2025-07-17

## 2025-07-17 ENCOUNTER — OFFICE VISIT (OUTPATIENT)
Dept: SURGERY | Facility: CLINIC | Age: 50
End: 2025-07-17
Payer: COMMERCIAL

## 2025-07-17 VITALS
SYSTOLIC BLOOD PRESSURE: 128 MMHG | OXYGEN SATURATION: 99 % | DIASTOLIC BLOOD PRESSURE: 71 MMHG | BODY MASS INDEX: 31.67 KG/M2 | HEIGHT: 67 IN | HEART RATE: 66 BPM | WEIGHT: 201.81 LBS

## 2025-07-17 DIAGNOSIS — C73 MEDULLARY THYROID CARCINOMA: ICD-10-CM

## 2025-07-17 DIAGNOSIS — Z90.89 S/P TOTAL THYROIDECTOMY: Primary | ICD-10-CM

## 2025-07-17 DIAGNOSIS — C73 THYROID CANCER: ICD-10-CM

## 2025-07-17 DIAGNOSIS — Z98.890 S/P TOTAL THYROIDECTOMY: Primary | ICD-10-CM

## 2025-07-17 DIAGNOSIS — E55.9 VITAMIN D INSUFFICIENCY: ICD-10-CM

## 2025-07-17 DIAGNOSIS — C73 PAPILLARY CARCINOMA, FOLLICULAR VARIANT: ICD-10-CM

## 2025-07-17 DIAGNOSIS — E89.0 POST-SURGICAL HYPOTHYROIDISM: ICD-10-CM

## 2025-07-17 PROCEDURE — 99999 PR PBB SHADOW E&M-EST. PATIENT-LVL III: CPT | Mod: PBBFAC,,, | Performed by: STUDENT IN AN ORGANIZED HEALTH CARE EDUCATION/TRAINING PROGRAM

## 2025-07-17 NOTE — ASSESSMENT & PLAN NOTE
RET testing negative.  Post-op CEA normal, calcitonin undetectable.  Repeat calcitonin every 6 months for 1 year (Due 10/2025)  6 month post op ultrasound clean. (Due 10/2025)

## 2025-07-17 NOTE — ASSESSMENT & PLAN NOTE
Low MAN risk  Post op Tg 0.5  6 month post op ultrasound clean  Repeat ultrasound at 6 months (Due 10/2025)

## 2025-07-17 NOTE — ASSESSMENT & PLAN NOTE
Reviewed labs.  TSH remains elevated on 137 mcg tab daily plus extra half tab Fridays.     Will increase to 150 mcg daily and take an extra half tab on Fridays  New Rx sent, he'll pick it up tomorrow  If he forgets for an entire day, he'll double up the next day  TSH at 6 weeks with an office visit the following week to review

## 2025-07-17 NOTE — ASSESSMENT & PLAN NOTE
Vitamin D insufficient.     - Recommend taking 2000 IU of over the counter vitamin D3 supplementation daily

## 2025-07-31 ENCOUNTER — CLINICAL SUPPORT (OUTPATIENT)
Dept: REHABILITATION | Facility: HOSPITAL | Age: 50
End: 2025-07-31
Attending: INTERNAL MEDICINE
Payer: COMMERCIAL

## 2025-07-31 DIAGNOSIS — M54.50 CHRONIC LEFT-SIDED LOW BACK PAIN WITHOUT SCIATICA: Primary | ICD-10-CM

## 2025-07-31 DIAGNOSIS — G89.29 CHRONIC LEFT-SIDED LOW BACK PAIN WITHOUT SCIATICA: Primary | ICD-10-CM

## 2025-07-31 PROCEDURE — 97161 PT EVAL LOW COMPLEX 20 MIN: CPT

## 2025-07-31 PROCEDURE — 97110 THERAPEUTIC EXERCISES: CPT

## 2025-07-31 NOTE — PROGRESS NOTES
Outpatient Rehab    Physical Therapy Evaluation    Patient Name: Talon Leblanc Jr.  MRN: [id number]  YOB: 1975  Encounter Date: 7/31/2025    Therapy Diagnosis:   Encounter Diagnosis   Name Primary?    Chronic left-sided low back pain without sciatica Yes     Physician: Giancarlo Covarrubias MD    Physician Orders: Eval and Treat  Medical Diagnosis: Chronic left-sided low back pain without sciatica  Surgical Diagnosis: Not applicable for this Episode   Surgical Date: Not applicable for this Episode  Days Since Last Surgery: Not applicable for this Episode    Visit # / Visits Authorized:  1 / 1  Insurance Authorization Period: 7/16/2025 to 12/31/2025  Date of Evaluation: 7/31/2025  Plan of Care Certification: 7/31/2025 to 9/30/2025     Time In: 1230   Time Out: 1320  Total Time (in minutes): 50   Total Billable Time (in minutes): 50    Intake Outcome Measure for FOTO Survey    Therapist reviewed FOTO scores for Talon Leblanc Jr. on 7/31/2025.   FOTO report - see Media section or FOTO account episode details.     Intake Score (%): 77    Precautions:       Subjective   History of Present Illness  Talon is a 50 y.o. male who reports to physical therapy with a chief concern of left sided back pain.     The patient reports a medical diagnosis of Chronic left-sided low back pain without sciatica.            Diagnostic tests related to this condition: X-ray.     X-Ray Details: FINDINGS:  The lumbar vertebra are intact.  No compression fracture is seen.  Alignment demonstrates grade 1 anterolisthesis at L4-5.  Some degenerative changes are again identified.  Small osteophytes are seen at several levels.  Disc space narrowing is present at L4-5 and L5-S1.  Sclerosis is noted at lower facet joints also.  SI joints are intact.     Impression:     No acute abnormality.  Chronic degenerative changes in lumbar spine.    History of Present Condition/Illness: Patient reports having left sided back pain  for about 3-4 months now without a saima. 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 Patient reports pain with lifting. Patient reports when the patient goes from sitting for a long time to standing he will have pain. Patient reports reports he is not limited functionally due to his back pain. Patient reports having L sided buttock pain on occasion. Patient reports using a tennis ball to rub his back sometimes feels good.          Activities of Daily Living  Social history was obtained from Patient.               Previously independent with activities of daily living? Yes     Currently independent with activities of daily living? Yes          Previously independent with instrumental activities of daily living? Yes     Currently independent with instrumental activities of daily living? Yes              Pain     Patient reports a current pain level of 0/10. Pain at best is reported as 0/10. Pain at worst is reported as 8/10.   Location: L lower back  Clinical Progression (since onset): Stable  Pain Qualities: Aching  Pain-Relieving Factors: Other (Comment)  Other Pain-Relieving Factors: massage to back  Pain-Aggravating Factors: Other (Comment)  Other Pain-Aggravating Factors: getting up after sitting for a long time.         Employment  Does the patient's condition impact their ability to work?: No  Employment Status: Employed full-time  Lifting packages at a  [employer]       Past Medical History/Physical Systems Review:   Talon Leblanc Jr.  has a past medical history of Glaucoma, Hypertension, Multinodular goiter (nontoxic), Multiple thyroid nodules, and Prediabetes.    Talon Leblanc Jr.  has a past surgical history that includes Fracture surgery; r femur fx w/ tatyana (Right, 2017); Carpal tunnel release (Right, 5/18/2022); Carpal tunnel release (Left, 8/19/2022); Colonoscopy (N/A, 12/5/2023); and Thyroidectomy (10/11/2024).    Talon has a current medication list which  includes the following prescription(s): latanoprost, levothyroxine, losartan, and rosuvastatin, and the following Facility-Administered Medications: 0.9% nacl, lidocaine (pf) 10 mg/ml (1%), and mupirocin.    Review of patient's allergies indicates:  No Known Allergies     Objective      Spinal Mobility  Hypomobile: Lumbosacral           Lumbar Range of Motion   Active (deg) Passive (deg) Pain   Flexion 80   Yes   Extension 15   Yes   Right Lateral Flexion 20   Yes (discomfort on L side)   Right Rotation  (WNL)       Left Lateral Flexion 25       Left Rotation  (WNL)                Hip Range of Motion   Right Hip   Active (deg) Passive (deg) Pain   Flexion 115 120     Extension 10 15     ABduction 40 45     ADduction 20 25     External Rotation 90/90 45 50     External Rotation Prone         Internal Rotation 90/90 25 30     Internal Rotation Prone             Left Hip   Active (deg) Passive (deg) Pain   Flexion 115 120     Extension 10 15     ABduction 40 45     ADduction 20 25     External Rotation 90/90 45 50     External Rotation Prone         Internal Rotation 90/90 25 30     Internal Rotation Prone                            Hip Strength - Planes of Motion   Right Strength Right Pain Left Strength Left  Pain   Flexion (L2) 4+   4+     Extension           ABduction 4   4     ADduction           Internal Rotation           External Rotation               Knee Strength   Right Strength Right Pain Left Strength Left  Pain   Flexion (S2) 4+ Yes (pain in lower back) 4+     Prone Flexion           Extension (L3) 4+   4+                Lumbar/Pelvic Girdle Special Tests       Lumbar Tests - SLR and Tension  Negative: Left Passive Straight Leg Raise and Left Crossed Straight Leg Raise                          Treatment:     THERAPEUTIC EXERCISES to develop strength, endurance, ROM, and flexibility for 25 minutes including    Education on HEP and Diagnosis     Supine Lower Trunk Rotation 20 reps - 3 seconds  hold  Hooklying Single Knee to Chest 20 reps  Supine Piriformis Stretch with Foot on Ground  4 sets - 30 seconds hold  Supine Bridge 3 sets - 10 reps - 1 second hold  Seated at desk lumbar flexion stretch   2 sets - 10 reps - 5s  hold    MANUAL THERAPY TECHNIQUES were applied to lower back for 0 minutes including:        NEUROMUSCULAR RE-EDUCATION ACTIVITIES to improve Coordination, Kinesthetic, Proprioception, and Posture for 0 minutes.  The following were included:        THERAPEUTIC ACTIVITIES to improve dynamic and functional performance for 0 minutes including        Assessment & Plan   Assessment  Talon presents with a condition of Low complexity.   Presentation of Symptoms: Stable       Functional Limitations: Activity tolerance, Completing work/school activities, Pain when reaching, Pain with ADLs/IADLs, Sitting tolerance, Range of motion, Performing household chores, Participating in leisure activities  Impairments: Activity intolerance, Abnormal or restricted range of motion, Impaired physical strength, Lack of appropriate home exercise program, Pain with functional activity  Personal Factors Affecting Prognosis: Pain    Patient Goal for Therapy (PT): Improve lower back pain  Prognosis: Good  Assessment Details: Patient demonstrates deficits with range of motion, strength, and function that limit ability to participate in pain free work, and recreational activities. Patients signs and symptoms present similarly to medical diagnosis and appears to have a muscular component as well. Patient tolerated today well with additions of exercises to address lumbar mobility and hip strength/activation. They would benefit from skilled PT services to normalize kinetic chain mobility, strength, and function to safely return to their prior level of activity.    Plan  From a physical therapy perspective, the patient would benefit from: Skilled Rehab Services    Planned therapy interventions include: Therapeutic  exercise, Therapeutic activities, Neuromuscular re-education, Manual therapy, and ADLs/IADLs.    Planned modalities to include: Diathermy, Electrical stimulation - attended, and Electrical stimulation - passive/unattended.        Visit Frequency: 1 times Per Week for 6 Weeks.       This plan was discussed with Patient.   Discussion participants: Agreed Upon Plan of Care             The patient's spiritual, cultural, and educational needs were considered, and the patient is agreeable to the plan of care and goals.     Education  Education was done with Patient. The patient's learning style includes Demonstration, Listening, and Pictures/video. The patient Demonstrates understanding and Verbalizes understanding.         Educated patient on patient diagnosis and prognosis. Educated patient on home program and expectations with physical therapy.       Goals:   Active       Functional outcome       Patient will show a significant change in FOTO patient-reported outcome tool to demonstrate subjective improvement       Start:  07/31/25    Expected End:  09/30/25            Patient stated goal: Improve lower back pain        Start:  07/31/25    Expected End:  09/30/25            Patient will demonstrate independence in home program for support of progression       Start:  07/31/25    Expected End:  09/30/25               Pain       Patient will report pain of 0/10 demonstrating a reduction of overall pain       Start:  07/31/25    Expected End:  09/30/25               Range of Motion       Patient will achieve spinal flexion to wnl without pain       Start:  07/31/25    Expected End:  09/30/25            Patient will achieve spinal extension to wnl without pain       Start:  07/31/25    Expected End:  09/30/25            Patient will achieve bilateral spinal side bending ROM to wnl without pain       Start:  07/31/25    Expected End:  09/30/25               Strength       Patient will achieve bilateral LEstrength of 5/5        Start:  07/31/25    Expected End:  09/30/25                Alberto Trevino PT, DPT

## 2025-07-31 NOTE — PATIENT INSTRUCTIONS
Access Code: [email or web address]  URL: [email or web address]  Date: 07/31/2025  Prepared by: Alberto Trevino    Exercises  - Supine Lower Trunk Rotation  - 1 x daily - 7 x weekly - 20 reps - 3 seconds hold  - Hooklying Single Knee to Chest  - 1 x daily - 7 x weekly - 20 reps  - Supine Piriformis Stretch with Foot on Ground  - 1 x daily - 7 x weekly - 4 sets - 30 seconds hold  - Supine Bridge  - 1 x daily - 7 x weekly - 3 sets - 10 reps - 1 second hold  - Seated at desk lumbar flexion stretch  - 1 x daily - 7 x weekly - 2 sets - 10 reps - 5s  hold

## 2025-08-07 ENCOUNTER — CLINICAL SUPPORT (OUTPATIENT)
Dept: REHABILITATION | Facility: HOSPITAL | Age: 50
End: 2025-08-07
Attending: INTERNAL MEDICINE
Payer: COMMERCIAL

## 2025-08-07 DIAGNOSIS — G89.29 CHRONIC LEFT-SIDED LOW BACK PAIN WITHOUT SCIATICA: Primary | ICD-10-CM

## 2025-08-07 DIAGNOSIS — M54.50 CHRONIC LEFT-SIDED LOW BACK PAIN WITHOUT SCIATICA: Primary | ICD-10-CM

## 2025-08-07 PROCEDURE — 97110 THERAPEUTIC EXERCISES: CPT | Mod: CQ

## 2025-08-07 PROCEDURE — 97112 NEUROMUSCULAR REEDUCATION: CPT | Mod: CQ

## 2025-08-07 NOTE — PROGRESS NOTES
"  Outpatient Rehab    Physical Therapy Visit    Patient Name: Talon Leblanc Jr.  MRN: [id number]  YOB: 1975  Encounter Date: 8/7/2025    Therapy Diagnosis:   Encounter Diagnosis   Name Primary?    Chronic left-sided low back pain without sciatica Yes     Physician: Giancarlo Covarrubias MD    Physician Orders: Eval and Treat  Medical Diagnosis: Chronic left-sided low back pain without sciatica  Surgical Diagnosis: Not applicable for this Episode   Surgical Date: Not applicable for this Episode  Days Since Last Surgery: Not applicable for this Episode    Visit # / Visits Authorized:  1 / 10  Insurance Authorization Period: 1/1/2025 to 12/31/2025  Date of Evaluation: 7/31/2025  Plan of Care Certification: 7/31/2025 to 9/30/2025      PT/PTA: PTA   Number of PTA visits since last PT visit:1  Time In: 1335   Time Out: 1430  Total Time (in minutes): 55   Total Billable Time (in minutes): 55    FOTO:  Intake Score (%): 77  Survey Score 2 (%): Not applicable for this Episode  Survey Score 3 (%): Not applicable for this Episode    Precautions:         Subjective   Back is feeling pretty good today, no issues with the HEP..  Pain reported as 0/10.      Objective            Treatment:     THERAPEUTIC EXERCISES to develop strength, endurance, ROM, and flexibility for 30 minutes including    LTR x 3 min  Seated SB walkouts 10x fwd/ L and R  Supine hamstring stretch 3x30  Piriformis stretch 3x30  SKTC 20x B  Seated HSS B 3x30"      MANUAL THERAPY TECHNIQUES were applied to  for 0 minutes including:        NEUROMUSCULAR RE-EDUCATION ACTIVITIES to improve Balance, Coordination, Kinesthetic, Sense, Proprioception, and Posture for 25 minutes.  The following were included:    PPT 3x10  TrA activation 3x10  Bridges 3x10  Nu Step x 6 min    THERAPEUTIC ACTIVITIES to improve dynamic and functional performance for 0 minutes including      Time Entry(in minutes):       Assessment & Plan   Assessment: Pt tolerated first " session of PT without any issues or reports of LBP. Reviewed HEP and implemented program with emphasis on core strength, tissue extensibility, and spinal mobility.  Evaluation/Treatment Tolerance: Patient tolerated treatment well    The patient will continue to benefit from skilled outpatient physical therapy in order to address the deficits listed in the problem list on the initial evaluation, provide patient and family education, and maximize the patients level of independence in the home and community environments.     The patient's spiritual, cultural, and educational needs were considered, and the patient is agreeable to the plan of care and goals.           Plan: Continue with POC to progress towards PT goals    Goals:   Active       Functional outcome       Patient will show a significant change in FOTO patient-reported outcome tool to demonstrate subjective improvement (Progressing)       Start:  07/31/25    Expected End:  09/30/25            Patient stated goal: Improve lower back pain  (Progressing)       Start:  07/31/25    Expected End:  09/30/25            Patient will demonstrate independence in home program for support of progression (Progressing)       Start:  07/31/25    Expected End:  09/30/25               Pain       Patient will report pain of 0/10 demonstrating a reduction of overall pain (Progressing)       Start:  07/31/25    Expected End:  09/30/25               Range of Motion       Patient will achieve spinal flexion to wnl without pain (Progressing)       Start:  07/31/25    Expected End:  09/30/25            Patient will achieve spinal extension to wnl without pain (Progressing)       Start:  07/31/25    Expected End:  09/30/25            Patient will achieve bilateral spinal side bending ROM to wnl without pain (Progressing)       Start:  07/31/25    Expected End:  09/30/25               Strength       Patient will achieve bilateral LEstrength of 5/5 (Progressing)       Start:  07/31/25     Expected End:  09/30/25                Serafin Saha, PTA

## 2025-08-21 ENCOUNTER — CLINICAL SUPPORT (OUTPATIENT)
Dept: REHABILITATION | Facility: HOSPITAL | Age: 50
End: 2025-08-21
Attending: INTERNAL MEDICINE
Payer: COMMERCIAL

## 2025-08-21 DIAGNOSIS — M54.50 CHRONIC LEFT-SIDED LOW BACK PAIN WITHOUT SCIATICA: Primary | ICD-10-CM

## 2025-08-21 DIAGNOSIS — G89.29 CHRONIC LEFT-SIDED LOW BACK PAIN WITHOUT SCIATICA: Primary | ICD-10-CM

## 2025-08-21 PROCEDURE — 97110 THERAPEUTIC EXERCISES: CPT | Mod: CQ

## 2025-08-21 PROCEDURE — 97112 NEUROMUSCULAR REEDUCATION: CPT | Mod: CQ

## 2025-08-22 ENCOUNTER — OFFICE VISIT (OUTPATIENT)
Dept: URGENT CARE | Facility: CLINIC | Age: 50
End: 2025-08-22
Payer: COMMERCIAL

## 2025-08-22 VITALS
HEART RATE: 71 BPM | WEIGHT: 201 LBS | RESPIRATION RATE: 16 BRPM | SYSTOLIC BLOOD PRESSURE: 143 MMHG | BODY MASS INDEX: 31.55 KG/M2 | OXYGEN SATURATION: 99 % | DIASTOLIC BLOOD PRESSURE: 81 MMHG | HEIGHT: 67 IN | TEMPERATURE: 98 F

## 2025-08-22 DIAGNOSIS — R09.81 CONGESTION OF PARANASAL SINUS: Primary | ICD-10-CM

## 2025-08-22 LAB
CTP QC/QA: YES
SARS-COV+SARS-COV-2 AG RESP QL IA.RAPID: NEGATIVE

## 2025-08-22 RX ORDER — LORATADINE 10 MG/1
10 TABLET ORAL DAILY
Qty: 30 TABLET | Refills: 0 | Status: SHIPPED | OUTPATIENT
Start: 2025-08-22

## 2025-08-22 RX ORDER — AZELASTINE 1 MG/ML
1 SPRAY, METERED NASAL 2 TIMES DAILY PRN
Qty: 30 ML | Refills: 0 | Status: SHIPPED | OUTPATIENT
Start: 2025-08-22

## 2025-08-28 ENCOUNTER — CLINICAL SUPPORT (OUTPATIENT)
Dept: REHABILITATION | Facility: HOSPITAL | Age: 50
End: 2025-08-28
Attending: INTERNAL MEDICINE
Payer: COMMERCIAL

## 2025-08-28 DIAGNOSIS — G89.29 CHRONIC LEFT-SIDED LOW BACK PAIN WITHOUT SCIATICA: Primary | ICD-10-CM

## 2025-08-28 DIAGNOSIS — M54.50 CHRONIC LEFT-SIDED LOW BACK PAIN WITHOUT SCIATICA: Primary | ICD-10-CM

## 2025-08-28 PROCEDURE — 97112 NEUROMUSCULAR REEDUCATION: CPT

## 2025-08-28 PROCEDURE — 97530 THERAPEUTIC ACTIVITIES: CPT

## 2025-08-28 PROCEDURE — 97110 THERAPEUTIC EXERCISES: CPT

## 2025-09-04 ENCOUNTER — CLINICAL SUPPORT (OUTPATIENT)
Dept: REHABILITATION | Facility: HOSPITAL | Age: 50
End: 2025-09-04
Attending: INTERNAL MEDICINE
Payer: COMMERCIAL

## 2025-09-04 DIAGNOSIS — M54.50 CHRONIC LEFT-SIDED LOW BACK PAIN WITHOUT SCIATICA: Primary | ICD-10-CM

## 2025-09-04 DIAGNOSIS — G89.29 CHRONIC LEFT-SIDED LOW BACK PAIN WITHOUT SCIATICA: Primary | ICD-10-CM

## 2025-09-04 PROCEDURE — 97530 THERAPEUTIC ACTIVITIES: CPT

## 2025-09-04 PROCEDURE — 97110 THERAPEUTIC EXERCISES: CPT

## 2025-09-04 PROCEDURE — 97112 NEUROMUSCULAR REEDUCATION: CPT

## (undated) DEVICE — GLOVE BIOGEL PI MICRO INDIC 7

## (undated) DEVICE — FORCEP STRAIGHT DISP

## (undated) DEVICE — CORD BIPOLAR 12 FOOT

## (undated) DEVICE — ADHESIVE DERMABOND ADVANCED

## (undated) DEVICE — TRAY MINOR GEN SURG OMC

## (undated) DEVICE — SUT VICRYL 6-0 P1 18IN UD

## (undated) DEVICE — DRAPE CORETEMP FLD WRM 56X62IN

## (undated) DEVICE — SPONGE COTTON TRAY 4X4IN

## (undated) DEVICE — DRAPE STERI-DRAPE 1000 17X11IN

## (undated) DEVICE — GAUZE DERMACEA LOW PLY 3X4YRDS

## (undated) DEVICE — TOURNIQUET SB QC DP 18X4IN

## (undated) DEVICE — DRESSING N ADH OIL EMUL 3X3

## (undated) DEVICE — DRAPE STERI INSTRUMENT 1018

## (undated) DEVICE — SUT VICRYL 3-0 27 SH

## (undated) DEVICE — MARKER SKIN RULER STERILE

## (undated) DEVICE — CHLORAPREP 10.5 ML APPLICATOR

## (undated) DEVICE — APPLICATOR CHLORAPREP ORN 26ML

## (undated) DEVICE — NDL SAFETY 22G X 1.5 ECLIPSE

## (undated) DEVICE — NDL 18GA X1 1/2 REG BEVEL

## (undated) DEVICE — SUT 3-0 12-18IN SILK

## (undated) DEVICE — SYR B-D DISP CONTROL 10CC100/C

## (undated) DEVICE — DRAPE EENT SPLIT STERILE

## (undated) DEVICE — PACK UPPER EXTREMITY BAPTIST

## (undated) DEVICE — DRESSING TRANS 4X4 TEGADERM

## (undated) DEVICE — GLOVE BIOGEL ECLIPSE SZ 7

## (undated) DEVICE — NDL 22GA X1 1/2 REG BEVEL

## (undated) DEVICE — BANDAGE MATRIX HK LOOP 2IN 5YD

## (undated) DEVICE — NDL HYPO REG 25G X 1 1/2

## (undated) DEVICE — Device

## (undated) DEVICE — GLOVE BIOGEL PI MICRO SZ 7

## (undated) DEVICE — SUT 4/0 18IN ETHILON BL P3

## (undated) DEVICE — SOL PVP-I SCRUB 7.5% 4OZ

## (undated) DEVICE — BANDAGE COBAN COLOR ASSORT 3X5

## (undated) DEVICE — DRAPE HALF SURGICAL 40X58IN

## (undated) DEVICE — GAUZE SPONGE PEANUT STRL

## (undated) DEVICE — CLIP LIGACLIP XTRA TITANIUM

## (undated) DEVICE — PAD CURAD NONADH 3X4IN

## (undated) DEVICE — SUT 2/0 30IN SILK BLK BRAI

## (undated) DEVICE — DRESSING XEROFORM FOIL PK 1X8

## (undated) DEVICE — GAUZE SPONGE 4X4 12PLY

## (undated) DEVICE — SUT LIGACLIP SMALL XTRA

## (undated) DEVICE — CONTAINER SPECIMEN OR STER 4OZ

## (undated) DEVICE — ELECTRODE REM PLYHSV RETURN 9

## (undated) DEVICE — DRAPE INSTR MAGNETIC 10X16IN

## (undated) DEVICE — ELECTRODE BLADE INSULATED 1 IN

## (undated) DEVICE — PAD UNDERPAD 30X30

## (undated) DEVICE — SUT 4-0 12-18IN SILK BLACK